# Patient Record
Sex: MALE | Race: WHITE | NOT HISPANIC OR LATINO | Employment: OTHER
[De-identification: names, ages, dates, MRNs, and addresses within clinical notes are randomized per-mention and may not be internally consistent; named-entity substitution may affect disease eponyms.]

---

## 2017-01-18 ENCOUNTER — OFFICE VISIT (OUTPATIENT)
Dept: DENTISTRY | Facility: CLINIC | Age: 47
End: 2017-01-18

## 2017-01-18 DIAGNOSIS — G47.33 OSA (OBSTRUCTIVE SLEEP APNEA): Primary | ICD-10-CM

## 2017-01-18 NOTE — Clinical Note
1/18/2017       RE: Vinicius Jerome  [address] [facility] [address]     Dear Colleague,    Thank you for referring your patient, Vinicius Jerome, to the [facility] at [facility]. Please see a copy of my visit note below.    S:   - pt in no acute distress  - no change in general health status  - pt and wife are satisfied with treatment effect   - no jaw pain, jaw discomfort, or jaw related headaches currently  - no functional problems (eating, chewing etc.)  - no bite problems  - no ear problems  - no acute dental issues  - stress is ok  - No family of arthritis or CA relevant for WILDER    O:  - Opening and protrusion: ok  - Teeth ok  - No M. Mass. + Temp palpation pain or discomfort  - No TMJ palpation pain  - No neck palpation pain  - No joint clicking  - V and VII are grossly intact  - lips ok, salivary glands ok, oral mucosa ok      A:  - Snoring (WILDER in supine position)  P:  - Pt knows how to adjust appliance but I instructed him not advance appliance without consultation  - follow-up appointment in 6 months scheduled for splint maintenance and oral health check  - pt should also inform regular dentist about the appliance  - splint care and morning exercises reinforced  if problems should occur pt should call     Again, thank you for allowing me to participate in the care of your patient.      Sincerely,    Ernesto Rodrigez DDS

## 2017-01-18 NOTE — Clinical Note
Date:January 20, 2017      Patient was self referred, no letter generated. Do not send.        [facility] Health Information

## 2017-01-19 NOTE — PROGRESS NOTES
S:   - pt in no acute distress  - no change in general health status  - pt and wife are satisfied with treatment effect   - no jaw pain, jaw discomfort, or jaw related headaches currently  - no functional problems (eating, chewing etc.)  - no bite problems  - no ear problems  - no acute dental issues  - stress is ok  - No family of arthritis or CA relevant for WILDER    O:  - Opening and protrusion: ok  - Teeth ok  - No M. Mass. + Temp palpation pain or discomfort  - No TMJ palpation pain  - No neck palpation pain  - No joint clicking  - V and VII are grossly intact  - lips ok, salivary glands ok, oral mucosa ok      A:  - Snoring (WILDER in supine position)  P:  - Pt knows how to adjust appliance but I instructed him not advance appliance without consultation  - follow-up appointment in 6 months scheduled for splint maintenance and oral health check  - pt should also inform regular dentist about the appliance  - splint care and morning exercises reinforced  - if problems should occur pt should call

## 2017-03-15 ENCOUNTER — TRANSFERRED RECORDS (OUTPATIENT)
Dept: HEALTH INFORMATION MANAGEMENT | Facility: CLINIC | Age: 47
End: 2017-03-15

## 2017-03-15 LAB
HBA1C MFR BLD: 7.8 % (ref 4–6)
TSH SERPL-ACNC: 4.11 MCU/ML

## 2017-04-26 ENCOUNTER — OFFICE VISIT (OUTPATIENT)
Dept: FAMILY MEDICINE | Facility: CLINIC | Age: 47
End: 2017-04-26
Payer: COMMERCIAL

## 2017-04-26 VITALS
HEART RATE: 65 BPM | SYSTOLIC BLOOD PRESSURE: 142 MMHG | TEMPERATURE: 97.5 F | WEIGHT: 202.2 LBS | OXYGEN SATURATION: 96 % | BODY MASS INDEX: 29.01 KG/M2 | DIASTOLIC BLOOD PRESSURE: 85 MMHG

## 2017-04-26 DIAGNOSIS — K64.4 EXTERNAL HEMORRHOIDS: Primary | ICD-10-CM

## 2017-04-26 PROCEDURE — 99213 OFFICE O/P EST LOW 20 MIN: CPT | Performed by: NURSE PRACTITIONER

## 2017-04-26 NOTE — PATIENT INSTRUCTIONS
"1.  Soak in hot water 4 times a day  2.  Apply the anusol twice a day  3.  Improve bowel transit - drink lots of water (aim for 90 oz per day) and consider a fiber supplement like psyllium husk (Metamucil) or Miralax    Right now, the hemorrhoid is not \"thrombosed,\" but looks like it may head that way.  Keep an eye on color, size and discomfort level.  If it turns deep purple, increases in pain or size, please schedule with colorectal surgery for hemrrhoid removal.      "

## 2017-04-26 NOTE — NURSING NOTE
"Chief Complaint   Patient presents with     Hemorrhoids       Initial /85 (BP Location: Left arm, Patient Position: Chair, Cuff Size: Adult Large)  Pulse 65  Temp 97.5  F (36.4  C) (Oral)  Wt 202 lb 3.2 oz (91.7 kg)  SpO2 96%  BMI 29.01 kg/m2 Estimated body mass index is 29.01 kg/(m^2) as calculated from the following:    Height as of 12/27/16: 5' 10\" (1.778 m).    Weight as of this encounter: 202 lb 3.2 oz (91.7 kg).  Medication Reconciliation: complete       Preet Genao MA      "

## 2017-04-26 NOTE — MR AVS SNAPSHOT
"              After Visit Summary   4/26/2017    Vinicius Jerome    MRN: [id number]           Patient Information     Date Of Birth          1970        Visit Information        Provider Department      4/26/2017 2:30 PM Teresa Edwards APRN [facility]        Today's Diagnoses     External hemorrhoids    -  1      Care Instructions    1.  Soak in hot water 4 times a day  2.  Apply the anusol twice a day  3.  Improve bowel transit - drink lots of water (aim for 90 oz per day) and consider a fiber supplement like psyllium husk (Metamucil) or Miralax    Right now, the hemorrhoid is not \"thrombosed,\" but looks like it may head that way.  Keep an eye on color, size and discomfort level.  If it turns deep purple, increases in pain or size, please schedule with colorectal surgery for hemrrhoid removal.            Follow-ups after your visit        Additional Services     COLORECTAL SURGERY REFERRAL       Your provider has referred you to: FMG: [facility] [place] [phone]   [email or web address]  FHN: [facility] [place] [phone]   [email or web address]  FHN: [facility] [place] [phone]   [email or web address]    Referral Reason(s): Hemorrhoids  Special Concerns: Diabetes - A1C 7.5-7.8  This referral is: Elective (week +)  It is OK to leave a message on patient's voicemail.    Please be aware that coverage of these services is subject to the terms and limitations of your health insurance plan.  Call member services at your health plan with any benefit or coverage questions.      Please bring the following with you to your appointment:    (1) Any X-Rays, CTs or MRIs which have been performed.  Contact the facility where they were done to arrange for  prior to your scheduled appointment.    (2) List of current medications  (3) This referral request " "  (4) Any documents/labs given to you for this referral                  Who to contact     If you have questions or need follow up information about today's clinic visit or your schedule please contact [facility] directly at [phone].  Normal or non-critical lab and imaging results will be communicated to you by MyChart, letter or phone within 4 business days after the clinic has received the results. If you do not hear from us within 7 days, please contact the clinic through [employer] or phone. If you have a critical or abnormal lab result, we will notify you by phone as soon as possible.  Submit refill requests through [employer] or call your pharmacy and they will forward the refill request to us. Please allow 3 business days for your refill to be completed.          Additional Information About Your Visit        Mavrx[employer]Visier Information     [employer]ue lets you send messages to your doctor, view your test results, renew your prescriptions, schedule appointments and more. To sign up, go to [email or web address] . Click on \"Log in\" on the left side of the screen, which will take you to the Welcome page. Then click on \"Sign up Now\" on the right side of the page.     You will be asked to enter the access code listed below, as well as some personal information. Please follow the directions to create your username and password.     Your access code is: [email or web address]  Expires: 2017  3:00 PM     Your access code will  in 90 days. If you need help or a new code, please call your [facility] or [phone].        Care EveryWhere ID     This is your Care EveryWhere ID. This could be used by other organizations to access your [facility] medical records  [id number]        Your Vitals Were     Pulse Temperature Pulse Oximetry BMI (Body Mass Index)          65 97.5  F (36.4  C) (Oral) 96% 29.01 kg/m2         Blood Pressure from Last 3 Encounters:   17 142/85   16 124/86   16 133/72    " Weight from Last 3 Encounters:   04/26/17 202 lb 3.2 oz (91.7 kg)   12/27/16 197 lb 9.6 oz (89.6 kg)   12/17/16 197 lb (89.4 kg)              We Performed the Following     COLORECTAL SURGERY REFERRAL          Today's Medication Changes          These changes are accurate as of: 4/26/17  3:04 PM.  If you have any questions, ask your nurse or doctor.               Start taking these medicines.        Dose/Directions    hydrocortisone 2.5 % cream   Commonly known as:  ANUSOL-HC   Used for:  External hemorrhoids   Started by:  Teresa Edwards APRN CNP        Place rectally 2 times daily   Quantity:  30 g   Refills:  1            Where to get your medicines      These medications were sent to [facility] [place], [place] - [address]  [address] [place] [address], [facility] [address]     Phone:  [phone]     hydrocortisone 2.5 % cream                Primary Care Provider    None Specified       No primary provider on file.        Thank you!     Thank you for choosing [facility]  for your care. Our goal is always to provide you with excellent care. Hearing back from our patients is one way we can continue to improve our services. Please take a few minutes to complete the written survey that you may receive in the mail after your visit with us. Thank you!             Your Updated Medication List - Protect others around you: Learn how to safely use, store and throw away your medicines at www.disposemymeds.org.          This list is accurate as of: 4/26/17  3:04 PM.  Always use your most recent med list.                   Brand Name Dispense Instructions for use    ACE NOT PRESCRIBED (INTENTIONAL)     0 each    ACE Inhibitor not prescribed due to       amoxicillin-clavulanate 875-125 MG per tablet    AUGMENTIN    20 tablet    Take 1 tablet by mouth 2 times daily       aspirin 81 MG tablet          1 TABLET DAILY       fluticasone 50 MCG/ACT spray    FLONASE    16 g    Spray 1-2 sprays  into both nostrils daily       hydrocortisone 2.5 % cream    ANUSOL-HC    30 g    Place rectally 2 times daily       insulin infusion pump Alexus      Use as directed       LIPID LOWERING THERAPY NOT PRESCRIBED (INTENTIONAL)     0 each    Lipid lowering therapy not prescribed intentionally due to Other pt is following up with endocrinology (This option does not exclude patient from measure)       MULTI vitamin  MENS Tabs          NovoLOG VIAL 100 UNITS/ML injection   Generic drug:  insulin aspart          ONE TOUCH ULTRA test strip   Generic drug:  blood glucose monitoring          ZYRTEC PO

## 2017-04-26 NOTE — PROGRESS NOTES
SUBJECTIVE:                                                    Vinicius Jerome is a 47 year old male who presents to clinic today for the following health issues:    Hemorrhoids     Onset: Monday evening to early Tuesday    Description:   Pain: YES- when sititng  Itching: no     Accompanying Signs & Symptoms:  Blood streaked toilet paper: no   Blood in stool: no   Changes in stool pattern: no    History:   Any previous GI studies done: Colonoscopy 9 years ago   Family History of colon cancer: YES- aunt (dad sister)     Precipitating factors:   None    Alleviating factors:  None     Therapies Tried and outcome: none      Questions/Concerns: None      Problem list and histories reviewed & adjusted, as indicated.  Additional history: as documented      Reviewed and updated as needed this visit by clinical staff       Reviewed and updated as needed this visit by Provider         ROS:    ROS:5 point ROS including CONST, HEENT, Respiratory, CV, and GI other than that noted in the HPI,  is negative       OBJECTIVE:                                                    /85 (BP Location: Left arm, Patient Position: Chair, Cuff Size: Adult Large)  Pulse 65  Temp 97.5  F (36.4  C) (Oral)  Wt 202 lb 3.2 oz (91.7 kg)  SpO2 96%  BMI 29.01 kg/m2  Body mass index is 29.01 kg/(m^2).  GENERAL: healthy, alert and no distress  RESP: lungs clear to auscultation - no rales, rhonchi or wheezes  CV: regular rate and rhythm, normal S1 S2, no S3 or S4, no murmur, click or rub, no peripheral edema and peripheral pulses strong  ABDOMEN: soft, nontender, no hepatosplenomegaly, no masses and bowel sounds normal  RECTAL (male): sphincter tone normal, no fissures and right sided external, soft, slightly purplish hemorrhoid    Diagnostic Test Results:  none      ASSESSMENT/PLAN:                                                      (K64.4) External hemorrhoids  (primary encounter diagnosis)  Comment:   Plan: COLORECTAL SURGERY REFERRAL,  "hydrocortisone         (ANUSOL-HC) 2.5 % cream    Patient Instructions   1.  Soak in hot water 4 times a day  2.  Apply the anusol twice a day  3.  Improve bowel transit - drink lots of water (aim for 90 oz per day) and consider a fiber supplement like psyllium husk (Metamucil) or Miralax    Right now, the hemorrhoid is not \"thrombosed,\" but looks like it may head that way.  Keep an eye on color, size and discomfort level.  If it turns deep purple, increases in pain or size, please schedule with colorectal surgery for hemrrhoid removal.          LINDA Tan [facility]3]    "

## 2017-06-27 ENCOUNTER — TRANSFERRED RECORDS (OUTPATIENT)
Dept: HEALTH INFORMATION MANAGEMENT | Facility: CLINIC | Age: 47
End: 2017-06-27

## 2017-06-27 LAB
ALT SERPL-CCNC: 16 U/L (ref 9–46)
CREAT SERPL-MCNC: 1.24 MG/DL (ref 0.6–1.35)
GFR SERPL CREATININE-BSD FRML MDRD: 68 ML/MIN/1.73M2
GLUCOSE SERPL-MCNC: 212 MG/DL (ref 65–99)
HBA1C MFR BLD: 7.9 % (ref 4–6)
POTASSIUM SERPL-SCNC: 4.7 MMOL/L (ref 3.5–5.3)
TSH SERPL-ACNC: 3.62 UIU/ML (ref 0.3–5)

## 2017-07-09 ENCOUNTER — TRANSFERRED RECORDS (OUTPATIENT)
Dept: HEALTH INFORMATION MANAGEMENT | Facility: CLINIC | Age: 47
End: 2017-07-09

## 2017-11-13 ENCOUNTER — TELEPHONE (OUTPATIENT)
Dept: FAMILY MEDICINE | Facility: CLINIC | Age: 47
End: 2017-11-13

## 2017-11-13 NOTE — TELEPHONE ENCOUNTER
Panel Management Review      Patient has the following on his problem list:     Diabetes    ASA: Failed    Last A1C  Lab Results   Component Value Date    A1C 7.9 06/27/2017    A1C 7.8 03/15/2017    A1C 7.6 02/10/2014    A1C 7.5 10/10/2013    A1C 7.5 06/25/2013     A1C tested: FAILED    Last LDL:    Lab Results   Component Value Date    CHOL 126 03/18/2013     Lab Results   Component Value Date    HDL 45 03/18/2013     Lab Results   Component Value Date    LDL 73 03/18/2013     Lab Results   Component Value Date    TRIG 39 03/18/2013     Lab Results   Component Value Date    CHOLHDLRATIO 2.8 03/18/2013     No results found for: NHDL    Is the patient on a Statin? NO             Is the patient on Aspirin? NO    Medications     Salicylates    ASPIRIN 81 MG OR TABS          Last three blood pressure readings:  BP Readings from Last 3 Encounters:   04/26/17 142/85   12/27/16 124/86   12/17/16 133/72       Date of last diabetes office visit: 06/27/2017     Tobacco History:     History   Smoking Status     Never Smoker   Smokeless Tobacco     Never Used             Composite cancer screening  Chart review shows that this patient is due/due soon for the following None  Summary:    Patient is due/failing the following:   A1C    Action needed:   Patient needs office visit for diabetes follow up.    Type of outreach:    Sent letter.    Questions for provider review:    None                                                                                                                                    Preet Genao MA     Chart routed to none.

## 2017-11-13 NOTE — LETTER
November 13, 2017      Vinicius RICKEY Jerome  [address] [facility] [address]        Dear Vinicius,    In order to ensure we are providing the best quality care, we have reviewed your chart and see that you are due for:    1. Diabetes follow up    Please call the clinic at your earliest convenience to schedule an appointment.  If you have completed these please contact our office via phone or [employer] to update our records.  We would like to know the date (approximately month and year), the result, and ideally where the procedure was performed.    Thank you for trusting us with your health care.      Sincerely,    Care Team for SIMA Marlow

## 2018-01-30 ENCOUNTER — TELEPHONE (OUTPATIENT)
Dept: FAMILY MEDICINE | Facility: CLINIC | Age: 48
End: 2018-01-30

## 2018-01-30 NOTE — TELEPHONE ENCOUNTER
Panel Management Review      Patient has the following on his problem list:     Diabetes    ASA: Not Required     Last A1C  Lab Results   Component Value Date    A1C 7.9 06/27/2017    A1C 7.8 03/15/2017    A1C 7.6 02/10/2014    A1C 7.5 10/10/2013    A1C 7.5 06/25/2013     A1C tested: FAILED    Last LDL:    Lab Results   Component Value Date    CHOL 126 03/18/2013     Lab Results   Component Value Date    HDL 45 03/18/2013     Lab Results   Component Value Date    LDL 73 03/18/2013     Lab Results   Component Value Date    TRIG 39 03/18/2013     Lab Results   Component Value Date    CHOLHDLRATIO 2.8 03/18/2013     No results found for: NHDL    Is the patient on a Statin? NO             Is the patient on Aspirin? NO    Medications     Salicylates    ASPIRIN 81 MG OR TABS          Last three blood pressure readings:  BP Readings from Last 3 Encounters:   04/26/17 142/85   12/27/16 124/86   12/17/16 133/72       Date of last diabetes office visit: 12/17/2016     Tobacco History:     History   Smoking Status     Never Smoker   Smokeless Tobacco     Never Used           Composite cancer screening  Chart review shows that this patient is due/due soon for the following None  Summary:    Patient is due/failing the following:   A1C    Action needed:   Patient needs office visit for diabetes follow up.    Type of outreach:    Phone, left message for patient to call back.     Questions for provider review:    None                                                                                                                                    Preet Genao MA       Chart routed to none.

## 2018-04-17 ENCOUNTER — TELEPHONE (OUTPATIENT)
Dept: ENDOCRINOLOGY | Facility: CLINIC | Age: 48
End: 2018-04-17

## 2018-04-17 ENCOUNTER — OFFICE VISIT (OUTPATIENT)
Dept: ENDOCRINOLOGY | Facility: CLINIC | Age: 48
End: 2018-04-17
Payer: COMMERCIAL

## 2018-04-17 VITALS
HEIGHT: 70 IN | HEART RATE: 64 BPM | SYSTOLIC BLOOD PRESSURE: 128 MMHG | WEIGHT: 203 LBS | DIASTOLIC BLOOD PRESSURE: 81 MMHG | BODY MASS INDEX: 29.06 KG/M2

## 2018-04-17 DIAGNOSIS — Z96.41 INSULIN PUMP STATUS: ICD-10-CM

## 2018-04-17 DIAGNOSIS — E10.9 TYPE 1 DIABETES MELLITUS WITHOUT COMPLICATION (H): ICD-10-CM

## 2018-04-17 DIAGNOSIS — E10.9 TYPE 1 DIABETES MELLITUS WITHOUT COMPLICATION (H): Primary | ICD-10-CM

## 2018-04-17 LAB — HBA1C MFR BLD: 8.1 % (ref 0–5.6)

## 2018-04-17 PROCEDURE — 99214 OFFICE O/P EST MOD 30 MIN: CPT | Performed by: INTERNAL MEDICINE

## 2018-04-17 PROCEDURE — 36415 COLL VENOUS BLD VENIPUNCTURE: CPT | Performed by: INTERNAL MEDICINE

## 2018-04-17 PROCEDURE — 80048 BASIC METABOLIC PNL TOTAL CA: CPT | Performed by: INTERNAL MEDICINE

## 2018-04-17 PROCEDURE — 82043 UR ALBUMIN QUANTITATIVE: CPT | Performed by: INTERNAL MEDICINE

## 2018-04-17 PROCEDURE — 84460 ALANINE AMINO (ALT) (SGPT): CPT | Performed by: INTERNAL MEDICINE

## 2018-04-17 PROCEDURE — 83036 HEMOGLOBIN GLYCOSYLATED A1C: CPT | Performed by: INTERNAL MEDICINE

## 2018-04-17 PROCEDURE — 84443 ASSAY THYROID STIM HORMONE: CPT | Performed by: INTERNAL MEDICINE

## 2018-04-17 NOTE — PATIENT INSTRUCTIONS
Continue current insulin pump and diabetes management plan  Plan to see Diabetes Educator at the [facility] to review insulin pump upgrade options   Including the Medtronic 630G vs 670G + Guardian3 sensor options  Consider signing up on the [employer] Books system for easier messaging  Call/message if questions.

## 2018-04-17 NOTE — TELEPHONE ENCOUNTER
Reason for Call:  Medication or medication refill:    Do you use a [facility]?  Name of the pharmacy and phone number for the current request:         [facility] - [place], [place] - [address]    Name of the medication requested: NOVOLOG VIAL 100 UNIT/ML soln    Other request: pharmacy wants to make sure you want a daily dose of 45 units?  They previously had 60 units daily on file, and just want to make sure the 45 units is correct.    Please call to advise    Can we leave a detailed message on this number? YES    Phone number pharmacy can be reached at: [phone]    Best Time: anytime    Call taken on 4/17/2018 at 12:11 PM by Luna Glover  .

## 2018-04-17 NOTE — LETTER
4/17/2018         RE: Vinicius Jerome  [address] [facility] [id number]        Dear Colleague,    Thank you for referring your patient, Vinicius Jerome, to the [facility]. Please see a copy of my visit note below.    Name: Vinicius Jerome is a 48 year old man, self-referred for evaluation of diabetes mellitus.  Previously seen by me at my former clinic ([facility]), here to establish care with [facility].    HPI:  Recent issues:  Here for f/u DM evaluation.  Feeling well overall, but family stressors with son had spinal fusion for treatment of scoliosis problem        Diagnosis of diabetes mellitus age 14, living in [facility]  ... Initial treatment with insulin injections, using Humalog, NPH, and possibly Lantus insulins  2001. Switched to insulin pump management, Medtronic pump  Current pump Medtronic 523 pump   Novolog in pump  Recalls pump OOW now  Using OneTouch Ultra BG meter, tests 4x/day  Has used Medtronic CGMS years ago  Previous [facility] labs include:  Lab Results   Component Value Date    A1C 7.9 (H) 06/27/2017     04/08/2008    POTASSIUM 4.7 06/27/2017    CHLORIDE 102 03/05/2008    CO2 28 03/05/2008    ANIONGAP 8 03/05/2008     (H) 06/27/2017    BUN 17 03/05/2008    CR 1.24 06/27/2017    GFRESTIMATED 68 06/27/2017    GFRESTBLACK 79 06/27/2017    JOCE 9.3 03/05/2008    CHOL 126 03/18/2013    TRIG 39 03/18/2013    HDL 45 03/18/2013    LDL 73 03/18/2013    MICROL 3mcg 11/26/2012     DM Complications:  none  Last eye exam 9/2018 at [facility]    , 2-children.  Works for [employer] in [employer] Dept, [place]  Sees for general medicine evaluations.    PMH/PSH:  Past Medical History:   Diagnosis Date     Congenital hypertrophic pyloric stenosis     surgically corrected as a child     Type 1 diabetes mellitus with moderate nonproliferative diabetic retinopathy and without macular edema (H)  12/17/2016     Type 1 diabetes mellitus with moderate nonproliferative retinopathy of both eyes without macular edema (H) 12/17/2016     Type I (juvenile type) diabetes mellitus without mention of complication, not stated as uncontrolled      Past Surgical History:   Procedure Laterality Date     C INCISION OF PYLORIC MUSCLE       C INSERT SUBCUT RESERV/PUMP/DEV       FINGER SURGERY         Family Hx:  Family History   Problem Relation Age of Onset     Cardiovascular Father      brain aneurysm- fully recovered     CANCER Mother      brain tumor - left her paralized on 1 side     CEREBROVASCULAR DISEASE Maternal Grandmother      HEART DISEASE Paternal Grandfather      HEART DISEASE Paternal Grandmother          Social Hx:  Social History     Social History     Marital status:      Spouse name: N/A     Number of children: 2     Years of education: N/A     Occupational History     Not on file.     Social History Main Topics     Smoking status: Never Smoker     Smokeless tobacco: Never Used     Alcohol use No     Drug use: No     Sexual activity: Yes     Partners: Female     Other Topics Concern     Parent/Sibling W/ Cabg, Mi Or Angioplasty Before 65f 55m? No     Social History Narrative          MEDICATIONS:  has a current medication list which includes the following prescription(s): cetirizine hcl, novolog vial, onetouch ultra, LIPID LOWERING THERAPY NOT PRESCRIBED, INTENTIONAL,, insulin infusion pump, multi vitamin  mens, and aspirin.    ROS:     ROS: 10 point ROS neg other than the symptoms noted above in the HPI.    GENERAL: no weight changes, fatigue, fevers, chills, night sweats.   HEENT: no dysphagia, odonophagia, diplopia, neck pain  THYROID:  no apparent hyper or hypothyroid symptoms  CV: no chest pain, pressure, palpitations  LUNGS: no SOB, VARGAS, cough, wheezing   ABDOMEN: no diarrhea, constipation, abdominal pain  EXTREMITIES: no rashes, ulcers, edema  NEUROLOGY: no headaches, denies changes in vision,  "tingling, extremitiy numbness   MSK: no muscle aches or pains, weakness  SKIN: no rashes or lesions  : good erection function, denies nocturia  PSYCH:  stable mood, no significant anxiety or depression  ENDOCRINE: no heat or cold intolerance    Physical Exam   VS: /81 (BP Location: Right arm, Cuff Size: Adult Regular)  Pulse 64  Ht 5' 10\" (1.778 m)  Wt 203 lb (92.1 kg)  BMI 29.13 kg/m2  GENERAL: AXOX3, NAD, well dressed, answering questions appropriately, appears stated age.  THYROID:  normal gland, no apparent nodules or goiter  HEENT: neck non-tender, no exopthalmous, EOMI  CV: RRR, no rubs, gallops, no murmurs  LUNGS: CTAB, no wheezes, rales, or ronchi  ABDOMEN: soft, nontender, nondistended  EXTREMITIES: no edema, +pedal pulses  NEUROLOGY: CN grossly intact, no tremors  MSK: grossly intact  SKIN: no rashes, no lesions    LABS:    All pertinent notes, labs, and images personally reviewed by me.     A/P:  Encounter Diagnoses   Name Primary?     Type 1 diabetes mellitus without complication (H) Yes     Insulin pump status      Comments:  Reviewed health history and diabetes issues.  Recent avg  mg/dl, often higher in evenings.    Plan:  Reviewed the overall T1DM management and insulin pump use.  Discussed optimal BG testing to assess glucose trends.  We reviewed insulin pump settings, basal rate and bolus dosing  Use of automated pump bolus dosing for meal/snack carb & correction dosing  Reviewed the Medtronic Carelink report data today    Recommend:  Continue insulin pump management plan.  Pump setting changes:   Change basal rates as 6p 0.65 to 0.675, 11p 0.65 to 0.675 u/hr   Continue current I:C ratios B17, L17, S15 and ISF daytime 55  Check nonfasting labs today  Plan to see DM Educator to review insulin pump upgrade options   Will arrange at the nearby  AV clinic  Consider future use of Fiasp insulin  Needs updated Rx's:    Novolog (5-vial) Rx, OneTouch Ultra TS's, uses  Mail Order " pharmacy (3-mo amt)  [facility]  Arrange annual dilated eye exam, fasting lipid panel testing.    Addressed patient's questions today.    Labs ordered today:   Orders Placed This Encounter   Procedures     Basic metabolic panel     ALT     Albumin Random Urine Quantitative with Creat Ratio     TSH     Hemoglobin A1c     DIABETES EDUCATOR REFERRAL     Radiology/Consults ordered today: DIABETES EDUCATOR REFERRAL    More than 50% of the time spent with Mr. Jerome on counseling / coordinating his care.  Total appointment time was 30 minutes.    Follow-up:  3 mo    Julien Escalona MD  Endocrinology  [facility]/[place]        Again, thank you for allowing me to participate in the care of your patient.        Sincerely,        Julien Escalona MD

## 2018-04-17 NOTE — NURSING NOTE
"Chief Complaint   Patient presents with     Diabetes       Initial /81 (BP Location: Right arm, Cuff Size: Adult Regular)  Pulse 64  Ht 5' 10\" (1.778 m)  Wt 203 lb (92.1 kg)  BMI 29.13 kg/m2 Estimated body mass index is 29.13 kg/(m^2) as calculated from the following:    Height as of this encounter: 5' 10\" (1.778 m).    Weight as of this encounter: 203 lb (92.1 kg).  Medication Reconciliation: complete       Carmine Escalera      "

## 2018-04-17 NOTE — TELEPHONE ENCOUNTER
Message from pharmacy noted.  I corrected the TDD as 60U per day and then resent the Novolog vial Rx today.    CACHORRO Escalona MD  [facility]/[place]

## 2018-04-17 NOTE — MR AVS SNAPSHOT
"              After Visit Summary   4/17/2018    Vinicius Jerome    MRN: [id number]           Patient Information     Date Of Birth          1970        Visit Information        Provider Department      4/17/2018 8:00 AM Julien Escalona MD [facility]        Today's Diagnoses     Type 1 diabetes mellitus without complication (H)    -  1    Insulin pump status          Care Instructions    Continue current insulin pump and diabetes management plan  Plan to see Diabetes Educator at the [facility] to review insulin pump upgrade options   Including the Medtronic 630G vs 670G + Guardian3 sensor options  Consider signing up on the [employer] system for easier messaging  Call/message if questions.          Follow-ups after your visit        Follow-up notes from your care team     Return in about 3 months (around 7/17/2018).      Who to contact     If you have questions or need follow up information about today's clinic visit or your schedule please contact [facility] directly at [phone].  Normal or non-critical lab and imaging results will be communicated to you by [employer], letter or phone within 4 business days after the clinic has received the results. If you do not hear from us within 7 days, please contact the clinic through [employer] or phone. If you have a critical or abnormal lab result, we will notify you by phone as soon as possible.  Submit refill requests through [employer] or call your pharmacy and they will forward the refill request to us. Please allow 3 business days for your refill to be completed.          Additional Information About Your Visit        [employer] Information     [employer] lets you send messages to your doctor, view your test results, renew your prescriptions, schedule appointments and more. To sign up, go to [email or web address] . Click on \"Log in\" on the left side of the screen, which will take you to the Welcome page. Then click on \"Sign up " "Now\" on the right side of the page.     You will be asked to enter the access code listed below, as well as some personal information. Please follow the directions to create your username and password.     Your access code is: [email or web address]  Expires: 2018  8:32 AM     Your access code will  in 90 days. If you need help or a new code, please call your [facility] or [phone].        Care EveryWhere ID     This is your Care EveryWhere ID. This could be used by other organizations to access your [place] medical records  [id number]        Your Vitals Were     Pulse Height BMI (Body Mass Index)             64 5' 10\" (1.778 m) 29.13 kg/m2          Blood Pressure from Last 3 Encounters:   18 128/81   17 142/85   16 124/86    Weight from Last 3 Encounters:   18 203 lb (92.1 kg)   17 202 lb 3.2 oz (91.7 kg)   16 197 lb 9.6 oz (89.6 kg)              We Performed the Following     Albumin Random Urine Quantitative with Creat Ratio     ALT     Basic metabolic panel     Hemoglobin A1c     TSH        Primary Care Provider Office Phone # Fax #    [facility] [phone] [address] [address] [address]        Equal Access to Services     KEVON VELA : Hadii aad ku hadasho Soomaali, waaxda luqadaha, qaybta kaalmada adeegyada, luciano prater. So [facility] [phone].    ATENCIÓN: Si habla español, tiene a christina disposición servicios gratuitos de asistencia lingüística. Llame al [phone].    We comply with applicable federal civil rights laws and [place] laws. We do not discriminate on the basis of race, color, national origin, age, disability, sex, sexual orientation, or gender identity.            Thank you!     Thank you for choosing [facility]  for your care. Our goal is always to provide you with excellent care. Hearing back from our patients is one way we can continue to improve our services. " Please take a few minutes to complete the written survey that you may receive in the mail after your visit with us. Thank you!             Your Updated Medication List - Protect others around you: Learn how to safely use, store and throw away your medicines at www.disposemymeds.org.          This list is accurate as of 4/17/18  8:32 AM.  Always use your most recent med list.                   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

## 2018-04-17 NOTE — PROGRESS NOTES
Name: Vinicius Jerome is a 48 year old man, self-referred for evaluation of diabetes mellitus.  Previously seen by me at my former clinic ([facility]), here to establish care with [facility].    HPI:  Recent issues:  Here for f/u DM evaluation.  Feeling well overall, but family stressors with son had spinal fusion for treatment of scoliosis problem        Diagnosis of diabetes mellitus age 14, living in [facility]  ... Initial treatment with insulin injections, using Humalog, NPH, and possibly Lantus insulins  2001. Switched to insulin pump management, Medtronic pump  Current pump Medtronic 523 pump   Novolog in pump  Recalls pump OOW now  Using OneTouch Ultra BG meter, tests 4x/day  Has used Medtronic CGMS years ago  Previous [facility] labs include:  Lab Results   Component Value Date    A1C 7.9 (H) 06/27/2017     04/08/2008    POTASSIUM 4.7 06/27/2017    CHLORIDE 102 03/05/2008    CO2 28 03/05/2008    ANIONGAP 8 03/05/2008     (H) 06/27/2017    BUN 17 03/05/2008    CR 1.24 06/27/2017    GFRESTIMATED 68 06/27/2017    GFRESTBLACK 79 06/27/2017    JOCE 9.3 03/05/2008    CHOL 126 03/18/2013    TRIG 39 03/18/2013    HDL 45 03/18/2013    LDL 73 03/18/2013    MICROL 3mcg 11/26/2012     DM Complications:  none  Last eye exam 9/2018 at [facility]    , 2-children.  Works for [employer] in [employer] Dept, [place] office  Sees for general medicine evaluations.    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) 12/17/2016     Type I (juvenile type) diabetes mellitus without mention of complication, not stated as uncontrolled       Past Surgical History:   Procedure Laterality Date     C INCISION OF PYLORIC MUSCLE       C INSERT SUBCUT RESERV/PUMP/DEV       FINGER SURGERY         Family Hx:  Family History   Problem Relation Age of Onset     Cardiovascular Father      brain aneurysm- fully recovered     CANCER Mother      brain tumor - left her paralized on 1 side     CEREBROVASCULAR DISEASE Maternal Grandmother      HEART DISEASE Paternal Grandfather      HEART DISEASE Paternal Grandmother          Social Hx:  Social History     Social History     Marital status:      Spouse name: N/A     Number of children: 2     Years of education: N/A     Occupational History     Not on file.     Social History Main Topics     Smoking status: Never Smoker     Smokeless tobacco: Never Used     Alcohol use No     Drug use: No     Sexual activity: Yes     Partners: Female     Other Topics Concern     Parent/Sibling W/ Cabg, Mi Or Angioplasty Before 65f 55m? No     Social History Narrative          MEDICATIONS:  has a current medication list which includes the following prescription(s): cetirizine hcl, novolog vial, onetouch ultra, LIPID LOWERING THERAPY NOT PRESCRIBED, INTENTIONAL,, insulin infusion pump, multi vitamin  mens, and aspirin.    ROS:     ROS: 10 point ROS neg other than the symptoms noted above in the HPI.    GENERAL: no weight changes, fatigue, fevers, chills, night sweats.   HEENT: no dysphagia, odonophagia, diplopia, neck pain  THYROID:  no apparent hyper or hypothyroid symptoms  CV: no chest pain, pressure, palpitations  LUNGS: no SOB, VARGAS, cough, wheezing   ABDOMEN: no diarrhea, constipation, abdominal pain  EXTREMITIES: no rashes, ulcers, edema  NEUROLOGY: no headaches, denies changes in vision, tingling, extremitiy numbness   MSK: no muscle aches or pains, weakness  SKIN: no rashes or lesions  : good erection function, denies nocturia  PSYCH:  stable mood, no significant anxiety or depression  ENDOCRINE: no heat or cold  "intolerance    Physical Exam   VS: /81 (BP Location: Right arm, Cuff Size: Adult Regular)  Pulse 64  Ht 5' 10\" (1.778 m)  Wt 203 lb (92.1 kg)  BMI 29.13 kg/m2  GENERAL: AXOX3, NAD, well dressed, answering questions appropriately, appears stated age.  THYROID:  normal gland, no apparent nodules or goiter  HEENT: neck non-tender, no exopthalmous, EOMI  CV: RRR, no rubs, gallops, no murmurs  LUNGS: CTAB, no wheezes, rales, or ronchi  ABDOMEN: soft, nontender, nondistended  EXTREMITIES: no edema, +pedal pulses  NEUROLOGY: CN grossly intact, no tremors  MSK: grossly intact  SKIN: no rashes, no lesions    LABS:    All pertinent notes, labs, and images personally reviewed by me.     A/P:  Encounter Diagnoses   Name Primary?     Type 1 diabetes mellitus without complication (H) Yes     Insulin pump status      Comments:  Reviewed health history and diabetes issues.  Recent avg  mg/dl, often higher in evenings.    Plan:  Reviewed the overall T1DM management and insulin pump use.  Discussed optimal BG testing to assess glucose trends.  We reviewed insulin pump settings, basal rate and bolus dosing  Use of automated pump bolus dosing for meal/snack carb & correction dosing  Reviewed the [employer]onic Carelink report data today    Recommend:  Continue insulin pump management plan.  Pump setting changes:   Change basal rates as 6p 0.65 to 0.675, 11p 0.65 to 0.675 u/hr   Continue current I:C ratios B17, L17, S15 and ISF daytime 55  Check nonfasting labs today  Plan to see DM Educator to review insulin pump upgrade options   Will arrange at the nearby  AV clinic  Consider future use of Fiasp insulin  Needs updated Rx's:    Novolog (5-vial) Rx, OneTouch Ultra TS's, uses  Mail Order pharmacy (3-mo amt)  [facility]  Arrange annual dilated eye exam, fasting lipid panel testing.    Addressed patient's questions today.    Labs ordered today:   Orders Placed This Encounter   Procedures     " Basic metabolic panel     ALT     Albumin Random Urine Quantitative with Creat Ratio     TSH     Hemoglobin A1c     DIABETES EDUCATOR REFERRAL     Radiology/Consults ordered today: DIABETES EDUCATOR REFERRAL    More than 50% of the time spent with Mr. Jerome on counseling / coordinating his care.  Total appointment time was 30 minutes.    Follow-up:  3 mo    Julien Escalona MD  Endocrinology  [facility]/[place]

## 2018-04-18 LAB
ALT SERPL W P-5'-P-CCNC: 24 U/L (ref 0–70)
ANION GAP SERPL CALCULATED.3IONS-SCNC: 2 MMOL/L (ref 3–14)
BUN SERPL-MCNC: 20 MG/DL (ref 7–30)
CALCIUM SERPL-MCNC: 8.7 MG/DL (ref 8.5–10.1)
CHLORIDE SERPL-SCNC: 107 MMOL/L (ref 94–109)
CO2 SERPL-SCNC: 30 MMOL/L (ref 20–32)
CREAT SERPL-MCNC: 1.08 MG/DL (ref 0.66–1.25)
GFR SERPL CREATININE-BSD FRML MDRD: 72 ML/MIN/1.7M2
GLUCOSE SERPL-MCNC: 237 MG/DL (ref 70–99)
POTASSIUM SERPL-SCNC: 4.7 MMOL/L (ref 3.4–5.3)
SODIUM SERPL-SCNC: 139 MMOL/L (ref 133–144)
TSH SERPL DL<=0.005 MIU/L-ACNC: 3.71 MU/L (ref 0.4–4)

## 2018-04-20 LAB
CREAT UR-MCNC: 136 MG/DL
MICROALBUMIN UR-MCNC: 5 MG/L
MICROALBUMIN/CREAT UR: 4.01 MG/G CR (ref 0–17)

## 2018-05-07 ENCOUNTER — ALLIED HEALTH/NURSE VISIT (OUTPATIENT)
Dept: EDUCATION SERVICES | Facility: CLINIC | Age: 48
End: 2018-05-07
Payer: COMMERCIAL

## 2018-05-07 DIAGNOSIS — E10.3393 TYPE 1 DIABETES MELLITUS WITH MODERATE NONPROLIFERATIVE RETINOPATHY OF BOTH EYES WITHOUT MACULAR EDEMA (H): Primary | ICD-10-CM

## 2018-05-07 PROCEDURE — G0108 DIAB MANAGE TRN  PER INDIV: HCPCS

## 2018-05-07 NOTE — PROGRESS NOTES
Diabetes Self Management Training: Insulin Pump Review Visit    Vinicius Jerome presents today for education related to Type 1 diabetes.    He is accompanied by self    Patient's diabetes management related comments/concerns: Medtronic pump is out of warranty and would like to know what options are available. Not sure interested in a sensor but would like to learn what is available.     ASSESSMENT:  Patient Problem List and Family Medical History reviewed for relevant medical history, current medical status, and diabetes risk factors.    Current Diabetes Management per Patient:  Insulin Pump Type: Medtronic 523/723    BG meter: One Touch meter    Taking other diabetes medications? no      Patient's most recent   Lab Results   Component Value Date    A1C 8.1 04/17/2018    is not meeting goal of <7.0      INTERVENTION:    Education provided today on:  Information provided on current  available insulin pump therapies, including Medtronic 670G closed loop system, Omnipod pump and Tandem insulin pump with Dexcom, advantages and disadvantages, and requirements for successful insulin pump therapy with each pump system were discussed. Hands on practice with pump button use each pump.     Opportunities for ongoing education and support in diabetes-self management were discussed.     Patientt verbalized understanding of concepts discussed and recommendations provided today.         Education Materials Provided:  Informational packets for Medtronic, Omnipod and Tandem insulin pumps, Dexcom and [employer].      PLAN:  Review packets and information provided at appointment today.    Call diabetes education when he decides to move forward with new or upgrading pump, will start the paperwork process and set up appointment for pump start education and training.       Eli Chawla RN,CDE   Time Spent: 60 minutes  Encounter Type: Individual      Any diabetes medication dose changes were made via the CDE Protocol and Collaborative  Practice Agreement with the patient's referring provider. A copy of this encounter was shared with the provider.

## 2018-05-07 NOTE — MR AVS SNAPSHOT
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  Normal or non-critical lab and imaging results will be communicated to you by MyChart, letter or phone within 4 business days after the clinic has received the results. If you do not hear from us within 7 days, please contact the clinic through [facility] or phone. If you have a critical or abnormal lab result, we will notify you by phone as soon as possible.  Submit refill requests through [facility] or call your pharmacy and they will forward the refill request to us. Please allow 3 business days for your refill to be completed.          Additional Information About Your Visit        MyChart Information     [facility] gives you secure access to your electronic health record. If you see a primary care provider, you can also send messages to your care team and make appointments. If you have questions, please call your primary care clinic.  If you do not have a primary care provider, please call [phone] and they will assist you.        Care EveryWhere ID     This is your Care EveryWhere ID. This could be used by other  organizations to access your [place] medical records  [id number]         Blood Pressure from Last 3 Encounters:   04/17/18 128/81   04/26/17 142/85   12/27/16 124/86    Weight from Last 3 Encounters:   04/17/18 92.1 kg (203 lb)   04/26/17 91.7 kg (202 lb 3.2 oz)   12/27/16 89.6 kg (197 lb 9.6 oz)              We Performed the Following     DIABETES EDUCATION - Individual  []        Primary Care Provider Fax #    Physician No Ref-Primary [phone]       No address on file        Equal Access to Services     [facility] : Hadii jose Lang, waaxda cinthyaqadaha, caraybalina kaalmahi jacobson, luciano machado . So [facility] [phone].    ATENCIÓN: Si habla español, tiene a christina disposición servicios gratuitos de asistencia lingüística. Llame al [phone].    We comply with applicable federal civil rights laws and [place] laws. We do not discriminate on the basis of race, color, national origin, age, disability, sex, sexual orientation, or gender identity.            Thank you!     Thank you for choosing [facility]  for your care. Our goal is always to provide you with excellent care. Hearing back from our patients is one way we can continue to improve our services. Please take a few minutes to complete the written survey that you may receive in the mail after your visit with us. Thank you!             Your Updated Medication List - Protect others around you: Learn how to safely use, store and throw away your medicines at www.disposemymeds.org.          This list is accurate as of 5/7/18  3:56 PM.  Always use your most recent med list.                   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    Type 1 diabetes mellitus without complication (H)       ZYRTEC PO           * Notice:  This list has 2 medication(s) that are the same as other medications prescribed for you. Read the directions carefully, and ask your doctor or other care provider to review them with you.

## 2018-05-07 NOTE — LETTER
5/7/2018         RE: Vinicius Jerome  [address] [address] [facility] [address]        Dear Colleague,    Thank you for referring your patient, Vinicius Jerome, to the [facility]. Please see a copy of my visit note below.    Diabetes Self Management Training: Insulin Pump Review Visit    Vinicius Jerome presents today for education related to Type 1 diabetes.    He is accompanied by self    Patient's diabetes management related comments/concerns: Medtronic pump is out of warranty and would like to know what options are available. Not sure interested in a sensor but would like to learn what is available.     ASSESSMENT:  Patient Problem List and Family Medical History reviewed for relevant medical history, current medical status, and diabetes risk factors.    Current Diabetes Management per Patient:  Insulin Pump Type: Medtronic 523/723    BG meter: One Touch meter    Taking other diabetes medications? no      Patient's most recent   Lab Results   Component Value Date    A1C 8.1 04/17/2018    is not meeting goal of <7.0      INTERVENTION:    Education provided today on:  Information provided on current  available insulin pump therapies, including Medtronic 670G closed loop system, Omnipod pump and Tandem insulin pump with Dexcom, advantages and disadvantages, and requirements for successful insulin pump therapy with each pump system were discussed. Hands on practice with pump button use each pump.     Opportunities for ongoing education and support in diabetes-self management were discussed.     Patientt verbalized understanding of concepts discussed and recommendations provided today.         Education Materials Provided:  Informational packets for Medtronic, Omnipod and Tandem insulin pumps, Dexcom and [employer].      PLAN:  Review packets and information provided at appointment today.    Call diabetes education when he decides to move forward with new or upgrading pump, will  start the paperwork process and set up appointment for pump start education and training.       Eli Chawla RN,CDE   Time Spent: 60 minutes  Encounter Type: Individual      Any diabetes medication dose changes were made via the CDE Protocol and Collaborative Practice Agreement with the patient's referring provider. A copy of this encounter was shared with the provider.

## 2018-07-17 ENCOUNTER — OFFICE VISIT (OUTPATIENT)
Dept: ENDOCRINOLOGY | Facility: CLINIC | Age: 48
End: 2018-07-17
Payer: COMMERCIAL

## 2018-07-17 VITALS
BODY MASS INDEX: 28.92 KG/M2 | HEIGHT: 70 IN | WEIGHT: 202 LBS | DIASTOLIC BLOOD PRESSURE: 76 MMHG | SYSTOLIC BLOOD PRESSURE: 116 MMHG | HEART RATE: 70 BPM

## 2018-07-17 DIAGNOSIS — E10.3393 TYPE 1 DIABETES MELLITUS WITH MODERATE NONPROLIFERATIVE RETINOPATHY OF BOTH EYES WITHOUT MACULAR EDEMA (H): Primary | ICD-10-CM

## 2018-07-17 DIAGNOSIS — Z96.41 INSULIN PUMP STATUS: ICD-10-CM

## 2018-07-17 LAB
ANION GAP SERPL CALCULATED.3IONS-SCNC: 6 MMOL/L (ref 3–14)
BUN SERPL-MCNC: 23 MG/DL (ref 7–30)
CALCIUM SERPL-MCNC: 9 MG/DL (ref 8.5–10.1)
CHLORIDE SERPL-SCNC: 106 MMOL/L (ref 94–109)
CHOLEST SERPL-MCNC: 137 MG/DL
CO2 SERPL-SCNC: 27 MMOL/L (ref 20–32)
CREAT SERPL-MCNC: 1.2 MG/DL (ref 0.66–1.25)
GFR SERPL CREATININE-BSD FRML MDRD: 64 ML/MIN/1.7M2
GLUCOSE SERPL-MCNC: 236 MG/DL (ref 70–99)
HBA1C MFR BLD: 7.8 % (ref 0–5.6)
HDLC SERPL-MCNC: 40 MG/DL
LDLC SERPL CALC-MCNC: 85 MG/DL
NONHDLC SERPL-MCNC: 97 MG/DL
POTASSIUM SERPL-SCNC: 4.2 MMOL/L (ref 3.4–5.3)
SODIUM SERPL-SCNC: 139 MMOL/L (ref 133–144)
TRIGL SERPL-MCNC: 60 MG/DL

## 2018-07-17 PROCEDURE — 80061 LIPID PANEL: CPT | Performed by: INTERNAL MEDICINE

## 2018-07-17 PROCEDURE — 80048 BASIC METABOLIC PNL TOTAL CA: CPT | Performed by: INTERNAL MEDICINE

## 2018-07-17 PROCEDURE — 83036 HEMOGLOBIN GLYCOSYLATED A1C: CPT | Performed by: INTERNAL MEDICINE

## 2018-07-17 PROCEDURE — 36415 COLL VENOUS BLD VENIPUNCTURE: CPT | Performed by: INTERNAL MEDICINE

## 2018-07-17 PROCEDURE — 99214 OFFICE O/P EST MOD 30 MIN: CPT | Performed by: INTERNAL MEDICINE

## 2018-07-17 NOTE — PROGRESS NOTES
Name: Vinicius Jerome      HPI:  Recent issues:  Here for f/u DM evaluation.  Feeling well overall, no new health issues reported  Has family vacations planned for [place] and also [place] soon        Diagnosis of diabetes mellitus age 14, living in [facility]  ... Initial treatment with insulin injections, using Humalog, NPH, and possibly Lantus insulins  2001. Switched to insulin pump management, Medtronic pump  Current pump Medtronic 523 pump   Novolog in pump  Recalls pump OOW now  Using OneTouch Ultra BG meter, tests 4x/day  Has used Medtronic CGMS years ago  Current pump settings:      Recent pump Carelink data:      Previous [facility] labs include:  Lab Results   Component Value Date    A1C 8.1 (H) 04/17/2018     04/17/2018    POTASSIUM 4.7 04/17/2018    CHLORIDE 107 04/17/2018    CO2 30 04/17/2018    ANIONGAP 2 (L) 04/17/2018     (H) 04/17/2018    BUN 20 04/17/2018    CR 1.08 04/17/2018    GFRESTIMATED 72 04/17/2018    GFRESTBLACK 88 04/17/2018    JOCE 8.7 04/17/2018    CHOL 126 03/18/2013    TRIG 39 03/18/2013    HDL 45 03/18/2013    LDL 73 03/18/2013    UCRR 136 04/17/2018    MICROL 5 04/17/2018    UMALCR 4.01 04/17/2018 5/7/18. Evaluation with [facility] CDE (VB)  DM Complications:  none  Last eye exam 9/2018 at [facility]      , 2-children.    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 for general medicine evaluations.    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) 12/17/2016     Type I (juvenile type) diabetes mellitus without mention of complication, not stated as uncontrolled      Past Surgical History:   Procedure Laterality Date     C  INCISION OF PYLORIC MUSCLE       C INSERT SUBCUT RESERV/PUMP/DEV       FINGER SURGERY         Family Hx:  Family History   Problem Relation Age of Onset     Cardiovascular Father      brain aneurysm- fully recovered     Cancer Mother      brain tumor - left her paralized on 1 side     Cerebrovascular Disease Maternal Grandmother      HEART DISEASE Paternal Grandfather      HEART DISEASE Paternal Grandmother          Social Hx:  Social History     Social History     Marital status:      Spouse name: N/A     Number of children: 2     Years of education: N/A     Occupational History     Not on file.     Social History Main Topics     Smoking status: Never Smoker     Smokeless tobacco: Never Used     Alcohol use No     Drug use: No     Sexual activity: Yes     Partners: Female     Other Topics Concern     Parent/Sibling W/ Cabg, Mi Or Angioplasty Before 65f 55m? No     Social History Narrative          MEDICATIONS:  has a current medication list which includes the following prescription(s): aspirin, blood glucose monitoring, cetirizine hcl, insulin infusion pump, LIPID LOWERING THERAPY NOT PRESCRIBED, INTENTIONAL,, multi vitamin  mens, novolog vial, and onetouch ultra.    ROS:     ROS: 10 point ROS neg other than the symptoms noted above in the HPI.    GENERAL: energy level good; no weight changes, denies fevers, chills, night sweats.   HEENT: no dysphagia, odonophagia, diplopia, neck pain  THYROID:  no apparent hyper or hypothyroid symptoms  CV: no chest pain, pressure, palpitations  LUNGS: no SOB, VARGAS, cough, wheezing   ABDOMEN: occasional heartburn; no diarrhea, constipation, abdominal pain  EXTREMITIES: no rashes, ulcers, edema  NEUROLOGY: no headaches, denies changes in vision, tingling, extremitiy numbness   MSK: no muscle aches or pains, weakness  SKIN: no rashes or lesions  : good erection function, denies nocturia  PSYCH:  stable mood, no significant anxiety or depression  ENDOCRINE: no heat or cold  "intolerance    Physical Exam   VS: /76 (BP Location: Right arm, Cuff Size: Adult Regular)  Pulse 70  Ht 1.778 m (5' 10\")  Wt 91.6 kg (202 lb)  BMI 28.98 kg/m2  GENERAL: AXOX3, NAD, well dressed, answering questions appropriately, appears stated age.  THYROID:  normal gland, no apparent nodules or goiter  HEENT: neck non-tender, no exopthalmous, EOMI  CV: RRR, no rubs, gallops, no murmurs  LUNGS: CTAB, no wheezes, rales, or ronchi  ABDOMEN: soft, nontender, nondistended  EXTREMITIES: no edema  NEUROLOGY: CN grossly intact, no tremors  MSK: grossly intact  SKIN: no rashes, no lesions    LABS:    All pertinent notes, labs, and images personally reviewed by me.     A/P:  Encounter Diagnoses   Name Primary?     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  Recent avg  mg/dl, often higher in evenings.    Plan:  Reviewed the overall T1DM management and insulin pump use.  Discussed optimal BG testing to assess glucose trends.  We reviewed insulin pump settings, basal rate and bolus dosing  Use of automated pump bolus dosing for meal/snack carb & correction dosing  Reviewed the [employer] Carelink report data today    Recommend:  Continue insulin pump management plan.  Pump setting changes:   Basals:  [place] 0.675 to 0.65   Bolus:  Add ISF 9p 60 mg/dl  Check nonfasting (pp- raisin bran cereal and grape juice) labs today  Will investigate the options for pump upgrade to 670G w/ sensor   Gave pump brochure today  Consider future use of Fiasp insulin  Discussed plan to take backup insulin vials and syringes during trip, use of lower pump Temp Basal during hiking   Will send Levemir vial Rx to his pharmacy  Arrange annual dilated eye exam, fasting lipid panel testing.    Keep regular followup appointments with PCP also  Addressed patient's questions today.    Labs " ordered today:   Orders Placed This Encounter   Procedures     Basic metabolic panel     Hemoglobin A1c     Lipid panel reflex to direct LDL Fasting     Radiology/Consults ordered today: None    More than 50% of the time spent with Mr. Jerome on counseling / coordinating his care.  Total appointment time was 30 minutes.    Follow-up:  3 mo    Julien Escalona MD  Endocrinology  [facility]

## 2018-07-17 NOTE — MR AVS SNAPSHOT
After Visit Summary   7/17/2018    Vinicius Jerome    MRN: [id number]           Patient Information     Date Of Birth          1970        Visit Information        Provider Department      7/17/2018 8:00 AM Julien Escalona MD [facility]        Today's Diagnoses     Type 1 diabetes mellitus with moderate nonproliferative retinopathy of both eyes without macular edema (H)    -  1    Insulin pump status           Follow-ups after your visit        Follow-up notes from your care team     Return in about 3 months (around 10/17/2018).      Your next 10 appointments already scheduled     Aug 13, 2018   Procedure with Cruz Momin MD   [facility] ([facility])    [address] [place] [address] [phone]           [facility] is located at [address] [place]              Who to contact     If you have questions or need follow up information about today's clinic visit or your schedule please contact [facility] directly at [phone].  Normal or non-critical lab and imaging results will be communicated to you by MyChart, letter or phone within 4 business days after the clinic has received the results. If you do not hear from us within 7 days, please contact the clinic through MyChart or phone. If you have a critical or abnormal lab result, we will notify you by phone as soon as possible.  Submit refill requests through [facility] or call your pharmacy and they will forward the refill request to us. Please allow 3 business days for your refill to be completed.          Additional Information About Your Visit        MyChart Information     [facility] gives you secure access to your electronic health record. If you see a primary care provider, you can also send messages to your care team and make appointments. If you have questions, please call your primary care clinic.  If you do not have a primary care provider,  "please call [phone] and they will assist you.        Care EveryWhere ID     This is your Care EveryWhere ID. This could be used by other organizations to access your [place] medical records  [id number]        Your Vitals Were     Pulse Height BMI (Body Mass Index)             70 1.778 m (5' 10\") 28.98 kg/m2          Blood Pressure from Last 3 Encounters:   07/17/18 116/76   04/17/18 128/81   04/26/17 142/85    Weight from Last 3 Encounters:   07/17/18 91.6 kg (202 lb)   04/17/18 92.1 kg (203 lb)   04/26/17 91.7 kg (202 lb 3.2 oz)              We Performed the Following     Basic metabolic panel     Hemoglobin A1c     Lipid panel reflex to direct LDL Fasting        Primary Care Provider Office Phone # Fax #    [facility] [phone] [address]  [facility] [address]        Equal Access to Services     KEVON VELA : Hadii aad ku hadasho Soomaali, waaxda luqadaha, qaybta kaalmada adeegyada, waxay leónin tacos machado . So [facility] [phone].    ATENCIÓN: Si habla espneda, tiene a christina disposición servicios gratuitos de asistencia lingüística. Llame al [phone].    We comply with applicable federal civil rights laws and [place] laws. We do not discriminate on the basis of race, color, national origin, age, disability, sex, sexual orientation, or gender identity.            Thank you!     Thank you for choosing [facility]  for your care. Our goal is always to provide you with excellent care. Hearing back from our patients is one way we can continue to improve our services. Please take a few minutes to complete the written survey that you may receive in the mail after your visit with us. Thank you!             Your Updated Medication List - Protect others around you: Learn how to safely use, store and throw away your medicines at www.disposemymeds.org.          This list is accurate as of 7/17/18  8:40 AM.  Always use your most recent med list. "                   Brand Name Dispense Instructions for use Diagnosis    aspirin 81 MG tablet          1 TABLET DAILY        insulin infusion pump Alexus      Use as directed        LIPID LOWERING THERAPY NOT PRESCRIBED (INTENTIONAL)     0 each    Lipid lowering therapy not prescribed intentionally due to Other pt is following up with endocrinology (This option does not exclude patient from measure)    Type 1 diabetes mellitus with moderate nonproliferative retinopathy of both eyes without macular edema (H)       MULTI vitamin  MENS Tabs       Routine general medical examination at a health care facility       NovoLOG VIAL 100 UNITS/ML injection   Generic drug:  insulin aspart     50 mL    Use with insulin pump, total daily dose 60 units, E10.9    Type 1 diabetes mellitus without complication (H)       * ONETOUCH ULTRA test strip   Generic drug:  blood glucose monitoring           * blood glucose monitoring test strip    no brand specified    400 strip    Use to test blood sugar 4 times daily or as directed.    Type 1 diabetes mellitus without complication (H)       ZYRTEC PO           * Notice:  This list has 2 medication(s) that are the same as other medications prescribed for you. Read the directions carefully, and ask your doctor or other care provider to review them with you.

## 2018-07-20 ENCOUNTER — MEDICAL CORRESPONDENCE (OUTPATIENT)
Dept: HEALTH INFORMATION MANAGEMENT | Facility: CLINIC | Age: 48
End: 2018-07-20

## 2018-07-26 ENCOUNTER — MYC MEDICAL ADVICE (OUTPATIENT)
Dept: ENDOCRINOLOGY | Facility: CLINIC | Age: 48
End: 2018-07-26

## 2018-08-08 RX ORDER — LIDOCAINE 40 MG/G
CREAM TOPICAL
Status: DISCONTINUED | OUTPATIENT
Start: 2018-08-08 | End: 2018-08-13 | Stop reason: HOSPADM

## 2018-08-08 RX ORDER — ONDANSETRON 2 MG/ML
4 INJECTION INTRAMUSCULAR; INTRAVENOUS
Status: DISCONTINUED | OUTPATIENT
Start: 2018-08-08 | End: 2018-08-13 | Stop reason: HOSPADM

## 2018-08-13 ENCOUNTER — HOSPITAL ENCOUNTER (OUTPATIENT)
Facility: CLINIC | Age: 48
Discharge: HOME | End: 2018-08-13
Attending: INTERNAL MEDICINE | Admitting: INTERNAL MEDICINE
Payer: COMMERCIAL

## 2018-08-13 VITALS
WEIGHT: 197 LBS | SYSTOLIC BLOOD PRESSURE: 108 MMHG | DIASTOLIC BLOOD PRESSURE: 72 MMHG | BODY MASS INDEX: 28.27 KG/M2 | OXYGEN SATURATION: 98 % | RESPIRATION RATE: 10 BRPM

## 2018-08-13 LAB
COLONOSCOPY: NORMAL
GLUCOSE BLDC GLUCOMTR-MCNC: 206 MG/DL (ref 70–99)

## 2018-08-13 PROCEDURE — 99153 MOD SED SAME PHYS/QHP EA: CPT | Performed by: INTERNAL MEDICINE

## 2018-08-13 PROCEDURE — G0121 COLON CA SCRN NOT HI RSK IND: HCPCS | Performed by: INTERNAL MEDICINE

## 2018-08-13 PROCEDURE — 45378 DIAGNOSTIC COLONOSCOPY: CPT | Performed by: INTERNAL MEDICINE

## 2018-08-13 PROCEDURE — G0500 MOD SEDAT ENDO SERVICE >5YRS: HCPCS | Performed by: INTERNAL MEDICINE

## 2018-08-13 PROCEDURE — 82962 GLUCOSE BLOOD TEST: CPT

## 2018-08-13 PROCEDURE — 25000128 H RX IP 250 OP 636: Performed by: INTERNAL MEDICINE

## 2018-08-13 RX ORDER — FENTANYL CITRATE 50 UG/ML
INJECTION, SOLUTION INTRAMUSCULAR; INTRAVENOUS PRN
Status: DISCONTINUED | OUTPATIENT
Start: 2018-08-13 | End: 2018-08-13 | Stop reason: HOSPADM

## 2018-08-13 RX ORDER — ONDANSETRON 2 MG/ML
4 INJECTION INTRAMUSCULAR; INTRAVENOUS
Status: COMPLETED | OUTPATIENT
Start: 2018-08-13 | End: 2018-08-13

## 2018-08-13 RX ORDER — LIDOCAINE 40 MG/G
CREAM TOPICAL
Status: DISCONTINUED | OUTPATIENT
Start: 2018-08-13 | End: 2018-08-13 | Stop reason: HOSPADM

## 2018-08-13 RX ORDER — FLUMAZENIL 0.1 MG/ML
0.2 INJECTION, SOLUTION INTRAVENOUS
Status: DISCONTINUED | OUTPATIENT
Start: 2018-08-13 | End: 2018-08-13 | Stop reason: HOSPADM

## 2018-08-13 RX ORDER — NALOXONE HYDROCHLORIDE 0.4 MG/ML
.1-.4 INJECTION, SOLUTION INTRAMUSCULAR; INTRAVENOUS; SUBCUTANEOUS
Status: DISCONTINUED | OUTPATIENT
Start: 2018-08-13 | End: 2018-08-13 | Stop reason: HOSPADM

## 2018-08-13 RX ORDER — ONDANSETRON 2 MG/ML
4 INJECTION INTRAMUSCULAR; INTRAVENOUS EVERY 6 HOURS PRN
Status: DISCONTINUED | OUTPATIENT
Start: 2018-08-13 | End: 2018-08-13 | Stop reason: HOSPADM

## 2018-08-13 RX ORDER — ONDANSETRON 4 MG/1
4 TABLET, ORALLY DISINTEGRATING ORAL EVERY 6 HOURS PRN
Status: DISCONTINUED | OUTPATIENT
Start: 2018-08-13 | End: 2018-08-13 | Stop reason: HOSPADM

## 2018-08-13 NOTE — IP AVS SNAPSHOT
MRN:[id number]                      After Visit Summary   8/13/2018    Vinicius Jerome    MRN: [id number]           Thank you!     Thank you for choosing [facility] for your care. Our goal is always to provide you with excellent care. Hearing back from our patients is one way we can continue to improve our services. Please take a few minutes to complete the written survey that you may receive in the mail after you visit. If you would like to speak to someone directly about your visit please contact Patient Relations at [phone]. Thank you!          Patient Information     Date Of Birth          1970        About your hospital stay     You were admitted on:  August 13, 2018 You last received care in the:  [facility]    You were discharged on:  August 13, 2018       Who to Call     For medical emergencies, please call 911.  For non-urgent questions about your medical care, please call your primary care provider or clinic, [phone]  For questions related to your surgery, please call your surgery clinic        Attending Provider     Provider Specialty    Link, Cruz LANG MD Gastroenterology       Primary Care Provider Office Phone # Fax #    [facility] [phone]      Your next 10 appointments already scheduled     Oct 23, 2018  9:00 AM CDT   Return Visit with Julien Escalona MD   [facility]    [address] [place] [id number] [phone]              Further instructions from your care team       The patient has received a copy of the Provation  report the doctor has written and discharge instructions have been discussed with the patient and responsible adult.  All questions were addressed and answered prior to patient discharge.    Pending Results     No orders found from 8/11/2018 to 8/14/2018.            Admission Information     Date & Time Provider Department  Dept. Phone    8/13/2018 Link, Cruz LANG MD [facility] [phone]      Your Vitals Were     Blood Pressure Respirations Weight Pulse Oximetry BMI (Body Mass Index)       120/79 14 89.4 kg (197 lb) 98% 28.27 kg/m2       MyChart Information     [employer] (Closed) gives you secure access to your electronic health record. If you see a primary care provider, you can also send messages to your care team and make appointments. If you have questions, please call your primary care clinic.  If you do not have a primary care provider, please call [phone] and they will assist you.        Care EveryWhere ID     This is your Care EveryWhere ID. This could be used by other organizations to access your [place] medical records  [id number]        Equal Access to Services     KEVON VELA : Niranjan Lang, alex carrillo, subhash kaaladriano jacobson, luciano prater. So [facility] [phone].    ATENCIÓN: Si habla español, tiene a christina disposición servicios gratuitos de asistencia lingüística. Llame al [phone].    We comply with applicable federal civil rights laws and [place] laws. We do not discriminate on the basis of race, color, national origin, age, disability, sex, sexual orientation, or gender identity.               Review of your medicines      UNREVIEWED medicines. Ask your doctor about these medicines        Dose / Directions    aspirin 81 MG tablet        1 TABLET DAILY   Quantity:      Refills:          LIPID LOWERING THERAPY NOT PRESCRIBED (INTENTIONAL)   Used for:  Type 1 diabetes mellitus with moderate nonproliferative retinopathy of both eyes without macular edema (H)        Lipid lowering therapy not prescribed intentionally due to Other pt is following up with endocrinology (This option does not exclude patient from measure)   Quantity:  0 each   Refills:  0       MULTI vitamin  MENS Tabs   Used for:  Routine general medical examination at a health care facility         Refills:  0       NovoLOG VIAL 100 UNITS/ML injection   Used for:  Type 1 diabetes mellitus without complication (H)   Generic drug:  insulin aspart        Use with insulin pump, total daily dose 60 units, E10.9   Quantity:  50 mL   Refills:  3       ZYRTEC PO        Refills:  0         CONTINUE these medicines which have NOT CHANGED        Dose / Directions    insulin infusion pump Alexus        Use as directed   Refills:  0       * ONETOUCH ULTRA test strip   Generic drug:  blood glucose monitoring        Refills:  0       * blood glucose monitoring test strip   Commonly known as:  no brand specified   Used for:  Type 1 diabetes mellitus without complication (H)        Use to test blood sugar 4 times daily or as directed.   Quantity:  400 strip   Refills:  3       * Notice:  This list has 2 medication(s) that are the same as other medications prescribed for you. Read the directions carefully, and ask your doctor or other care provider to review them with you.             Protect others around you: Learn how to safely use, store and throw away your medicines at www.disposemymeds.org.             Medication List: This is a list of all your medications and when to take them. Check marks below indicate your daily home schedule. Keep this list as a reference.      Medications           Morning Afternoon Evening Bedtime As Needed    aspirin 81 MG tablet   1 TABLET DAILY                                insulin infusion pump Alexus   Use as directed                                LIPID LOWERING THERAPY NOT PRESCRIBED (INTENTIONAL)   Lipid lowering therapy not prescribed intentionally due to Other pt is following up with endocrinology (This option does not exclude patient from measure)                                MULTI vitamin  MENS Tabs                                NovoLOG VIAL 100 UNITS/ML injection   Use with insulin pump, total daily dose 60 units, E10.9   Generic drug:  insulin aspart                                 * ONETOUCH ULTRA test strip   Generic drug:  blood glucose monitoring                                * blood glucose monitoring test strip   Commonly known as:  no brand specified   Use to test blood sugar 4 times daily or as directed.                                ZYRTEC PO                                * Notice:  This list has 2 medication(s) that are the same as other medications prescribed for you. Read the directions carefully, and ask your doctor or other care provider to review them with you.

## 2018-08-13 NOTE — H&P
Pre-Endoscopy History and Physical     Vinicius Jerome MRN# [id number]   YOB: 1970 Age: 48 year old     Date of Procedure: 8/13/2018  Primary care provider: Kenton Diaz  Type of Endoscopy: colonoscopy  Reason for Procedure: screening  Type of Anesthesia Anticipated: Conscious Sedation    HPI:    Vinicius is a 48 year old male who will be undergoing the above procedure.      A history and physical has been performed. The patient's medications and allergies have been reviewed. The risks and benefits of the procedure and the sedation options and risks were discussed with the patient.  All questions were answered and informed consent was obtained.      He denies a personal or family history of anesthesia complications or bleeding disorders.     Patient Active Problem List   Diagnosis     Internal Hemorrhoids     CARDIOVASCULAR SCREENING; LDL GOAL LESS THAN 100     Type 1 diabetes mellitus with moderate nonproliferative retinopathy of both eyes without macular edema (H)     Insulin pump status        Past Medical History:   Diagnosis Date     Congenital hypertrophic pyloric stenosis     surgically corrected as a child     Type 1 diabetes mellitus with moderate nonproliferative diabetic retinopathy and without macular edema (H) 12/17/2016     Type 1 diabetes mellitus with moderate nonproliferative retinopathy of both eyes without macular edema (H) 12/17/2016     Type I (juvenile type) diabetes mellitus without mention of complication, not stated as uncontrolled         Past Surgical History:   Procedure Laterality Date     C INCISION OF PYLORIC MUSCLE       C INSERT SUBCUT RESERV/PUMP/DEV       FINGER SURGERY         Relevant Family History: NONE    Relevant Social History: NONE     Prior to Admission medications    Medication Sig Start Date End Date Taking? Authorizing Provider   ASPIRIN 81 MG OR TABS 1 TABLET DAILY 10/21/03      blood glucose monitoring (NO BRAND SPECIFIED) test strip Use  "to test blood sugar 4 times daily or as directed. 4/17/18   Julien Escalona MD   Cetirizine HCl (ZYRTEC PO)     Reported, Patient   INSULIN INFUSION PUMP SIMI Use as directed    Reported, Patient   LIPID LOWERING THERAPY NOT PRESCRIBED, INTENTIONAL, Lipid lowering therapy not prescribed intentionally due to Other pt is following up with endocrinology (This option does not exclude patient from measure) 12/17/16   Lito Figueroa MD   MULTI VITAMIN MENS OR TABS  12/30/05   Fran Rothman MD   NOVOLOG VIAL 100 UNIT/ML soln Use with insulin pump, total daily dose 60 units, E10.9 4/17/18   Julien Ecsalona MD   ONE TOUCH ULTRA test strip  9/27/16   Reported, Patient       Allergies   Allergen Reactions     No Known Drug Allergies         REVIEW OF SYSTEMS:   A relevant review of systems was performed and was negative    PHYSICAL EXAM:   /74  Resp 11  Wt 89.4 kg (197 lb)  SpO2 98%  BMI 28.27 kg/m2 Estimated body mass index is 28.27 kg/(m^2) as calculated from the following:    Height as of 7/17/18: 1.778 m (5' 10\").    Weight as of this encounter: 89.4 kg (197 lb).   GENERAL APPEARANCE: alert, and oriented  MENTAL STATUS: alert  AIRWAY EXAM: Normal  RESP: lungs clear to auscultation - no rales, rhonchi or wheezes  CV: regular rates and rhythm  DIAGNOSTICS:    Not indicated    IMPRESSION   ASA Class 2 - Mild systemic disease    PLAN:   Plan for colonoscopy. We discussed the risks, benefits and alternatives and the patient wished to proceed.      Signed Electronically by: Cruz Momin  August 13, 2018            "

## 2018-08-13 NOTE — LETTER
July 19, 2018      Vinicius Jerome  [address] [facility] [address]        Thank you for choosing [facility]. You are scheduled for the following service.     Date:  8/13/2018 Monday             Procedure:  COLONOSCOPY  Doctor:        Dr. Cruz Momin   Arrival Time:  8:00 AM  *Check in at Emergency/Endoscopy desk*  Procedure Time:  8:30 AM    Location:   [facility]        Endoscopy Department, First Floor (Enter through ER Doors) *        [address] [place], [place] [address]      [phone] or [phone] () to reschedule      MIRALAX -GATORADE  PREP  Colonoscopy is the most accurate test to detect colon polyps and colon cancer; and the only test where polyps can be removed. During this procedure, a doctor examines the lining of your large intestine and rectum through a flexible tube.       Transportation  Arrange for a ride for the day of your procedure with a responsible adult.  A taxi ride is not an option unless you are accompanied by a responsible adult. If you fail to arrange transportation with a responsible adult, your procedure will be cancelled and rescheduled.    Purchase the  following supplies at your local pharmacy:  - 2 (two) bisacodyl tablets: each tablet contains 5 mg.  (Dulcolax  laxative NOT Dulcolax  stool softener)   - 1 (one) 8.3 oz bottle of Polyethylene Glycol (PEG) 3350 Powder   (MiraLAX , Smooth LAX , ClearLAX  or equivalent)  - 64 oz Gatorade    Regular Gatorade, Gatorade G2 , Powerade , Powerade Zero  or Pedialyte  is acceptable. Red colored flavors are not allowed; all other colors (yellow, green, orange, purple and blue) are okay. It is also okay to buy two 2.12 oz packets of powdered Gatorade that can be mixed with water to a total volume of 64 oz of liquid.  - 1 (one) 10 oz bottle of Magnesium Citrate (Red colored flavors are not allowed)  It is also okay for you to use a 0.5 oz package of powdered  magnesium citrate (17 g) mixed with 10 oz of water.    PREPARATION FOR COLONOSCOPY    7 days before:    Discontinue fiber supplements and medications containing iron. This includes Metamucil  and Fibercon ; and multivitamins with iron.  3 days before:    Begin a low-fiber diet. A low-fiber diet helps making the cleanout more effective.     Examples of a low-fiber diet include (but are not limited to): white bread, white rice, pasta, crackers, fish, chicken, eggs, ground beef, creamy peanut butter, cooked/steamed/boiled vegetables, canned fruit, bananas, melons, milk, plain yogurt cheese, salad dressing and other condiments.     The following are not allowed on a low-fiber diet: seeds, nuts, popcorn, bran, whole wheat, corn, quinoa, raw fruits and vegetables, berries and dried fruit, beans and lentils.    For additional details on low-fiber diet, please refer to the table on the last page.  2 days before:    Continue the low-fiber diet.     Drink at least 8 glasses of water throughout the day.     Stop eating solid foods at 11:45 pm.  1 day before:    In the morning: begin a clear liquid diet (liquids you can see through).     Examples of a clear liquid diet include: water, clear broth or bouillon, Gatorade, Pedialyte or Powerade, carbonated and non-carbonated soft drinks (Sprite , 7-Up , ginger ale), strained fruit juices without pulp (apple, white grape, white cranberry), Jell-O  and popsicles.     The following are not allowed on a clear liquid diet: red liquids, alcoholic beverages, dairy products (milk, creamer, and yogurt), protein shakes, creamy broths, juice with pulp and chewing tobacco.    At noon: take 2 (two) bisacodyl tablets     At 4 (and no later than 6pm): start drinking the Miralax-Gatorade preparation (8.3 oz of Miralax mixed with 64 oz of Gatorade in a large pitcher). Drink 1(one) 8 oz glass every 15 minutes thereafter, until the mixture is gone.    COLON CLEANSING TIPS: drink adequate amounts of  fluids before and after your colon cleansing to prevent dehydration. Stay near a toilet because you will have diarrhea. Even if you are sitting on the toilet, continue to drink the cleansing solution every 15 minutes. If you feel nauseous or vomit, rinse your mouth with water, take a 15 to 30-minute-break and then continue drinking the solution. You will be uncomfortable until the stool has flushed from your colon (in about 2 to 4 hours). You may feel chilled.    Day of your procedure  You may take all of your morning medications including blood pressure medications, blood thinners (if you have not been instructed to stop these by our office), methadone, anti-seizure medications with sips of water 3 hours prior to your procedure or earlier. Do not take insulin or vitamins prior to your procedure. Continue the clear liquid diet.   4 hours prior: drink 10 oz of magnesium citrate. It may be easier to drink it with a straw.    STOP consuming all liquids after that.     Do not take anything by mouth during this time.     Allow extra time to travel to your procedure as you may need to stop and use a restroom along the way.  You are ready for the procedure, if you followed all instructions and your stool is no longer formed, but clear or yellow liquid. If you are unsure whether your colon is clean, please call our office at [phone] before you leave for your appointment.  Bring the following to your procedure:  - Insurance Card/Photo ID.   - List of current medications including over-the-counter medications and supplements.   - Your rescue inhaler if you currently use one to control asthma.      Canceling or rescheduling your appointment:   If you must cancel or reschedule your appointment, please call [phone] as soon as possible.      COLONOSCOPY PRE-PROCEDURE CHECKLIST  If you have diabetes, ask your regular doctor for diet and medication restrictions.  If you take an anticoagulant or anti-platelet medication  (such as Coumadin , Lovenox , Pradaxa , Xarelto , Eliquis , etc.), please call your primary doctor for advice on holding this medication.  If you take aspirin you may continue to do so.  If you are or may be pregnant, please discuss the risks and benefits of this procedure with your doctor.          What happens during a colonoscopy?    Plan to spend up to two hours, starting at registration time, at the endoscopy center the day of your procedure. The colonoscopy takes an average of 15 to 30 minutes. Recovery time is about 30 minutes.    Before the exam:    You will change into a gown.    Your medical history and medication list will be reviewed with you, unless that has been done over the phone prior to the procedure.     A nurse will insert an intravenous (IV) line into your hand or arm.    The doctor will meet with you and will give you a consent form to sign.    During the exam:     Medicine will be given through the IV line to help you relax.     Your heart rate and oxygen levels will be monitored. If your blood pressure is low, you may be given fluids through the IV line.     The doctor will insert a flexible hollow tube, called a colonoscope, into your rectum. The scope will be advanced slowly through the large intestine (colon).    You may have a feeling of fullness or pressure.     If an abnormal tissue or a polyp is found, the doctor may remove it through the endoscope for closer examination, or biopsy. Tissue removal is painless    After the exam:           Any tissue samples removed during the exam will be sent to a lab for evaluation. It may take 5-7 working days for you to be notified of the results.     A nurse will provide you with complete discharge instructions before you leave the endoscopy center. Be sure to ask the nurse for specific instructions if you take blood thinners such as Aspirin, Coumadin or Plavix.     The doctor will prepare a full report for you and for the physician who referred  you for the procedure.     Your doctor will talk with you about the initial results of your exam.      Medication given during the exam will prohibit you from driving for the rest of the day.     Following the exam, you may resume your normal diet. Your first meal should be light, no greasy foods. Avoid alcohol until the next day.     You may resume your regular activities the day after the procedure.     LOW-FIBER DIET    Foods RECOMMENDED Foods to AVOID   Breads, Cereal, Rice and Pasta:   White bread, rolls, biscuits, croissant and willy toast.   Waffles, Georgian toast and pancakes.   White rice, noodles, pasta, macaroni and peeled cooked potatoes.   Plain crackers and saltines.   Cooked cereals: farina, cream of rice.   Cold cereals: Puffed Rice , Rice Krispies , Corn Flakes  and Special K    Breads, Cereal, Rice and Pasta:   Breads or rolls with nuts, seeds or fruit.   Whole wheat, pumpernickel, rye breads and cornbread.   Potatoes with skin, brown or wild rice, and kasha (buckwheat).     Vegetables:   Tender cooked and canned vegetables without seeds: carrots, asparagus tips, green or wax beans, pumpkin, spinach, lima beans. Vegetables:   Raw or steamed vegetables.   Vegetables with seeds.   Sauerkraut.   Winter squash, peas, broccoli, Brussel sprouts, cabbage, onions, cauliflower, baked beans, peas and corn.   Fruits:   Strained fruit juice.   Canned fruit, except pineapple.   Ripe bananas and melon. Fruits:   Prunes and prune juice.   Raw fruits.   Dried fruits: figs, dates and raisins.   Milk/Dairy:   Milk: plain or flavored.   Yogurt, custard and ice cream.   Cheese and cottage cheese Milk/Dairy:     Meat and other proteins:   ground, well-cooked tender beef, lamb, ham, veal, pork, fish, poultry and organ meats.   Eggs.   Peanut butter without nuts. Meat and other proteins:   Tough, fibrous meats with gristle.   Dry beans, peas and lentils.   Peanut butter with nuts.   Tofu.   Fats, Snack, Sweets, Condiments  and Beverages:   Margarine, butter, oils, mayonnaise, sour cream and salad dressing, plain gravy.   Sugar, hard candy, clear jelly, honey and syrup.   Spices, cooked herbs, bouillon, broth and soups made with allowed vegetable, ketchup and mustard.   Coffee, tea and carbonated drinks.   Plain cakes, cookies and pretzels.   Gelatin, plain puddings, custard, ice cream, sherbet and popsicles. Fats, Snack, Sweets, Condiments and Beverages:   Nuts, seeds and coconut.   Jam, marmalade and preserves.   Pickles, olives, relish and horseradish.   All desserts containing nuts, seeds, dried fruit and coconut; or made from whole grains or bran.   Candy made with nuts or seeds.   Popcorn.                     DIRECTIONS TO THE ENDOSCOPY DEPARTMENT     From the north ([facility])  Take [address] South, exit on [address] [address]. Get into the left hand milagro, turn left (east), go one-half mile to [address] and turn left. Go north to the first stoplight, take a right on [address] and follow it to the Emergency entrance.    From the south ([facility])  Take 35N to the [address]E split and exit on [address] [address]. On [address] [address], turn left (west) to [address]. Turn right (north) on [address]. Go north to the first stoplight, take a right on [address] and follow it to the Emergency entrance.    From the east via 35E ([facility])  Take [address] south to [address] [address] exit. Turn right on [address] . Go west to [address]. Turn right (north) on [address]. Go to the first stoplight, take a right and follow on [address] to the Emergency entrance.    From the east via [address] ([facility])  Take [address] to [address]. Turn left (south) on [address] to [address]. Turn left (east) on [address] and follow it to the Emergency entrance.    From the west via [address] ([place], [place])  Take [address] east to [address]. Turn right  (south) on [address] to [address]. Turn left (east) on [address] and follow it to the Emergency entrance.

## 2018-08-27 ENCOUNTER — TELEPHONE (OUTPATIENT)
Dept: ENDOCRINOLOGY | Facility: CLINIC | Age: 48
End: 2018-08-27

## 2018-08-27 DIAGNOSIS — E10.9 TYPE 1 DIABETES MELLITUS WITHOUT COMPLICATION (H): Primary | ICD-10-CM

## 2018-08-27 DIAGNOSIS — Z96.41 INSULIN PUMP STATUS: ICD-10-CM

## 2018-08-27 NOTE — TELEPHONE ENCOUNTER
Reason for Call:  Medication or medication refill:    Do you use a Home-Account Pharmacy?  Name of the pharmacy and phone number for the current request:       [facility] - [place], [place] - [facility] [address]    Name of the medication requested: Contour Next Test Strips    Other request: none    Can we leave a detailed message on this number? YES    Phone number patient can be reached at: Home number on file [phone] (home)    Best Time: anytime    Call taken on 8/27/2018 at 9:42 AM by Rigoberto Boles

## 2018-08-27 NOTE — TELEPHONE ENCOUNTER
Dr. Escalona,    Please see message below. Pt would like Contour Next Test Strips. Previously using One Touch Ultra test strips.   Please order if appropriate. Pharm pended.    Thank you,  William NEVES RN

## 2018-08-28 NOTE — TELEPHONE ENCOUNTER
Message noted.  The Leana Contour Next test strip Rx now sent to his pharmacy as requested.    CACHORRO Escalona MD  Endocrinology   Clinics [place]

## 2018-09-05 ENCOUNTER — TELEPHONE (OUTPATIENT)
Dept: ENDOCRINOLOGY | Facility: CLINIC | Age: 48
End: 2018-09-05

## 2018-09-05 NOTE — TELEPHONE ENCOUNTER
Medication:  Contour Next Test Strips          Other Information:          [facility]  Phone: [phone]  Fax: [phone]

## 2018-09-05 NOTE — TELEPHONE ENCOUNTER
PA Initiation    Medication: Contour Next Test Strips  Insurance Company: [employer] - Phone [phone] Fax [phone]  Pharmacy Filling the Rx: [facility] - [place], [place] - [facility] [address]  Filling Pharmacy Phone: [phone]  Filling Pharmacy Fax:    Start Date: 9/5/2018    THIS HAS BEEN SUBMITTED BY THE PRIOR-AUTHORIZATION TEAM. ANY QUESTIONS PLEASE CALL [phone]. THANK YOU

## 2018-09-07 NOTE — TELEPHONE ENCOUNTER
Prior Authorization Approval    Authorization Effective Date: 9/7/2018  Authorization Expiration Date: 9/7/2019  Medication: Contour Next Test Strips APPROVED   Approved Dose/Quantity:   Reference #:     Insurance Company: [employer] - Phone [phone] Fax [phone]  Expected CoPay:       CoPay Card Available:      Foundation Assistance Needed:    Which Pharmacy is filling the prescription (Not needed for infusion/clinic administered): [facility] - [place], [place] - [facility] [address]  Pharmacy Notified: Yes  Patient Notified: Yes

## 2018-10-03 ENCOUNTER — TRANSFERRED RECORDS (OUTPATIENT)
Dept: HEALTH INFORMATION MANAGEMENT | Facility: CLINIC | Age: 48
End: 2018-10-03

## 2018-10-23 ENCOUNTER — OFFICE VISIT (OUTPATIENT)
Dept: ENDOCRINOLOGY | Facility: CLINIC | Age: 48
End: 2018-10-23
Payer: COMMERCIAL

## 2018-10-23 VITALS
HEIGHT: 70 IN | WEIGHT: 202.9 LBS | DIASTOLIC BLOOD PRESSURE: 80 MMHG | SYSTOLIC BLOOD PRESSURE: 128 MMHG | BODY MASS INDEX: 29.05 KG/M2 | HEART RATE: 65 BPM

## 2018-10-23 DIAGNOSIS — E10.9 TYPE 1 DIABETES MELLITUS WITHOUT COMPLICATION (H): ICD-10-CM

## 2018-10-23 DIAGNOSIS — Z96.41 INSULIN PUMP STATUS: Primary | ICD-10-CM

## 2018-10-23 PROBLEM — Z86.39 HISTORY OF DIABETIC RETINOPATHY: Status: ACTIVE | Noted: 2018-10-23

## 2018-10-23 LAB
ANION GAP SERPL CALCULATED.3IONS-SCNC: 8 MMOL/L (ref 3–14)
BUN SERPL-MCNC: 27 MG/DL (ref 7–30)
CALCIUM SERPL-MCNC: 9.6 MG/DL (ref 8.5–10.1)
CHLORIDE SERPL-SCNC: 106 MMOL/L (ref 94–109)
CO2 SERPL-SCNC: 27 MMOL/L (ref 20–32)
CREAT SERPL-MCNC: 1.22 MG/DL (ref 0.66–1.25)
GFR SERPL CREATININE-BSD FRML MDRD: 63 ML/MIN/1.7M2
GLUCOSE SERPL-MCNC: 45 MG/DL (ref 70–99)
HBA1C MFR BLD: 7.1 % (ref 0–5.6)
POTASSIUM SERPL-SCNC: 4.4 MMOL/L (ref 3.4–5.3)
SODIUM SERPL-SCNC: 141 MMOL/L (ref 133–144)

## 2018-10-23 PROCEDURE — 95251 CONT GLUC MNTR ANALYSIS I&R: CPT | Performed by: INTERNAL MEDICINE

## 2018-10-23 PROCEDURE — 36415 COLL VENOUS BLD VENIPUNCTURE: CPT | Performed by: INTERNAL MEDICINE

## 2018-10-23 PROCEDURE — 80048 BASIC METABOLIC PNL TOTAL CA: CPT | Performed by: INTERNAL MEDICINE

## 2018-10-23 PROCEDURE — 83036 HEMOGLOBIN GLYCOSYLATED A1C: CPT | Performed by: INTERNAL MEDICINE

## 2018-10-23 PROCEDURE — 99214 OFFICE O/P EST MOD 30 MIN: CPT | Performed by: INTERNAL MEDICINE

## 2018-10-23 NOTE — MR AVS SNAPSHOT
"              After Visit Summary   10/23/2018    Vinicius Jerome    MRN: [id number]           Patient Information     Date Of Birth          1970        Visit Information        Provider Department      10/23/2018 9:00 AM Julien Escalona MD [facility]        Today's Diagnoses     Insulin pump status    -  1    Type 1 diabetes mellitus without complication (H)           Follow-ups after your visit        Follow-up notes from your care team     Return in about 3 months (around 1/23/2019).      Who to contact     If you have questions or need follow up information about today's clinic visit or your schedule please contact [facility] directly at [phone].  Normal or non-critical lab and imaging results will be communicated to you by MyChart, letter or phone within 4 business days after the clinic has received the results. If you do not hear from us within 7 days, please contact the clinic through tenfarmshart or phone. If you have a critical or abnormal lab result, we will notify you by phone as soon as possible.  Submit refill requests through [employer] or call your pharmacy and they will forward the refill request to us. Please allow 3 business days for your refill to be completed.          Additional Information About Your Visit        MyChart Information     [employer] gives you secure access to your electronic health record. If you see a primary care provider, you can also send messages to your care team and make appointments. If you have questions, please call your primary care clinic.  If you do not have a primary care provider, please call [phone] and they will assist you.        Care EveryWhere ID     This is your Care EveryWhere ID. This could be used by other organizations to access your [place] medical records  [id number]        Your Vitals Were     Pulse Height BMI (Body Mass Index)             65 1.778 m (5' 10\") 29.11 kg/m2          Blood Pressure from Last 3 Encounters: "   10/23/18 128/80   08/13/18 108/72   07/17/18 116/76    Weight from Last 3 Encounters:   10/23/18 92 kg (202 lb 14.4 oz)   08/13/18 89.4 kg (197 lb)   07/17/18 91.6 kg (202 lb)              We Performed the Following     Basic metabolic panel     GLUCOSE MONITOR, 72 HOUR, PHYS INTERP     Hemoglobin A1c        Primary Care Provider Office Phone # Fax #    K[facility] Mays [facility] [phone] [address] [facility] [address]        Equal Access to Services     [facility] Center[facility] : Hadii aad ku hadasho Soomaali, waaxda luqadaha, qaybta kaalmada adetongyahi, luciano machado . So [facility] [place] [phone].    ATENCIÓN: Si habla español, tiene a christina disposición servicios gratuitos de asistencia lingüística. Llame al [phone].    We comply with applicable federal civil rights laws and [place] laws. We do not discriminate on the basis of race, color, national origin, age, disability, sex, sexual orientation, or gender identity.            Thank you!     Thank you for choosing [facility]  for your care. Our goal is always to provide you with excellent care. Hearing back from our patients is one way we can continue to improve our services. Please take a few minutes to complete the written survey that you may receive in the mail after your visit with us. Thank you!             Your Updated Medication List - Protect others around you: Learn how to safely use, store and throw away your medicines at www.disposemymeds.org.          This list is accurate as of 10/23/18  5:30 PM.  Always use your most recent med list.                   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    Type 1 diabetes mellitus without complication (H)       * CONTOUR NEXT TEST test strip   Generic drug:  blood glucose monitoring     400 strip    Use to test blood sugar 4 times daily or as directed, Leana Contour Next strips, E10.9    Type 1 diabetes mellitus without complication (H), Insulin pump status       ZYRTEC PO           * Notice:  This list has 3 medication(s) that are the same as other medications prescribed for you. Read the directions carefully, and ask your doctor or other care provider to review them with you.

## 2018-10-23 NOTE — PROGRESS NOTES
Name: Vinicius Jerome      HPI:  Recent issues:  Here for f/u DM evaluation.  Now using new Medtronic pump and sensor, likes it  Feeling well overall, no new health issues reported        Diagnosis of diabetes mellitus age 14, living in [facility]  ... Initial treatment with insulin injections, using Humalog, NPH, and possibly Lantus insulins  2001. Switched to insulin pump management, Medtronic pump  Has used Medtronic CGMS years ago  Previously used the Medtronic 523 pump  8/2018. Upgraded to Medtronic 670G with Guardian3 sensor   Novolog in pump  Using OneTouch Ultra BG meter, tests 4x/day  Current pump settings:      Recent pump Carelink data:       Previous  labs include:  Lab Results   Component Value Date    A1C 7.1 (H) 10/23/2018     10/23/2018    POTASSIUM 4.4 10/23/2018    CHLORIDE 106 10/23/2018    CO2 27 10/23/2018    ANIONGAP 8 10/23/2018    GLC 45 (LL) 10/23/2018    BUN 27 10/23/2018    CR 1.22 10/23/2018    GFRESTIMATED 63 10/23/2018    GFRESTBLACK 76 10/23/2018    JOCE 9.6 10/23/2018    CHOL 137 07/17/2018    TRIG 60 07/17/2018    HDL 40 07/17/2018    LDL 85 07/17/2018    NHDL 97 07/17/2018    UCRR 136 04/17/2018    MICROL 5 04/17/2018    UMALCR 4.01 04/17/2018 5/7/18. Evaluation with  CDE (VB)  Last eye exam 9/2018 at [facility]  DM Complications:  none        , 2-children.    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, [employer] office  Previous gen me evaluations at [facility]      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) 12/17/2016     Type I (juvenile type) diabetes mellitus without mention of complication, not stated as uncontrolled       Past Surgical History:   Procedure Laterality Date     C INCISION OF PYLORIC MUSCLE       C INSERT SUBCUT RESERV/PUMP/DEV       COLONOSCOPY N/A 8/13/2018    Procedure: COLONOSCOPY;  COLONOSCOPY;  Surgeon: Cruz Momin MD;  Location: [facility]     FINGER SURGERY         Family Hx:  Family History   Problem Relation Age of Onset     Cardiovascular Father      brain aneurysm- fully recovered     Cancer Mother      brain tumor - left her paralized on 1 side     Cerebrovascular Disease Maternal Grandmother      HEART DISEASE Paternal Grandfather      HEART DISEASE Paternal Grandmother          Social Hx:  Social History     Social History     Marital status:      Spouse name: N/A     Number of children: 2     Years of education: N/A     Occupational History     Not on file.     Social History Main Topics     Smoking status: Never Smoker     Smokeless tobacco: Never Used     Alcohol use No     Drug use: No     Sexual activity: Yes     Partners: Female     Other Topics Concern     Parent/Sibling W/ Cabg, Mi Or Angioplasty Before 65f 55m? No     Social History Narrative          MEDICATIONS:  has a current medication list which includes the following prescription(s): blood glucose monitoring, cetirizine hcl, contour next test, insulin infusion pump, multi vitamin  mens, novolog vial, onetouch ultra, aspirin, and LIPID LOWERING THERAPY NOT PRESCRIBED, INTENTIONAL,.    ROS:     ROS: 10 point ROS neg other than the symptoms noted above in the HPI.    GENERAL: energy good; no weight changes, denies fevers, chills, night sweats.   HEENT: no dysphagia, odonophagia, diplopia, neck pain  THYROID:  no apparent hyper or hypothyroid symptoms  CV: no chest pain, pressure, palpitations  LUNGS: no SOB, VARGAS, cough, wheezing   ABDOMEN: occasional heartburn; no diarrhea, constipation, abdominal pain  EXTREMITIES: no rashes, ulcers, edema  NEUROLOGY: no headaches, denies changes in vision, tingling, extremitiy numbness   MSK: no  "muscle aches or pains, weakness  SKIN: no rashes or lesions  : good erection function, denies nocturia  PSYCH:  stable mood, no significant anxiety or depression  ENDOCRINE: no heat or cold intolerance    Physical Exam   VS: /80  Pulse 65  Ht 1.778 m (5' 10\")  Wt 92 kg (202 lb 14.4 oz)  BMI 29.11 kg/m2  GENERAL: AXOX3, NAD, well dressed, answering questions appropriately, appears stated age.  THYROID:  normal gland, no apparent nodules or goiter  ABDOMEN: soft, nontender, nondistended  NEUROLOGY: CN grossly intact, no tremors  MSK: grossly intact  SKIN: no rashes, no lesions    LABS:    All pertinent notes, labs, and images personally reviewed by me.     A/P:  Encounter Diagnoses   Name Primary?     Type 1 diabetes mellitus without complication (H)      Insulin pump status Yes     Comments:  Reviewed health history and diabetes issues.    Plan:  Reviewed the overall T1DM management and insulin pump use.  Discussed optimal BG testing to assess glucose trends.  We reviewed insulin pump settings, basal rate and bolus dosing  Use of automated pump bolus dosing for meal/snack carb & correction dosing  Reviewed the [employer] Carelink report data today  Reviewed the recent Carelink Guardian3 CGMS glucose trend report, in detail    Recommend:  Continue the current insulin pump and glucose sensor management plan.  No insulin pump setting changes at this time.  Discussed idea of Fiasp insulin use to offset the postmeal spike in glucose levels.   Gave Fiasp sample vial today   Monitor CGMS trends and send MyChart update to me in 1-2 weeks  Check labs today  Resume taking baby aspirin tablet daily  Arrange annual dilated eye exam, fasting lipid panel testing.    Keep regular followup appointments with PCP also  Addressed patient's questions today.    Labs ordered today:   Orders Placed This Encounter   Procedures     GLUCOSE MONITOR, 72 HOUR, PHYS INTERP     Basic metabolic panel     Hemoglobin A1c "     Radiology/Consults ordered today: None    More than 50% of the time spent with Mr. Jerome on counseling / coordinating his care.  Total appointment time was 30 minutes.    Follow-up:  3 mo    Julien Escalona MD  Endocrinology  [facility]

## 2019-02-11 ENCOUNTER — OFFICE VISIT (OUTPATIENT)
Dept: ENDOCRINOLOGY | Facility: CLINIC | Age: 49
End: 2019-02-11
Payer: COMMERCIAL

## 2019-02-11 VITALS
HEART RATE: 70 BPM | BODY MASS INDEX: 29.35 KG/M2 | SYSTOLIC BLOOD PRESSURE: 123 MMHG | DIASTOLIC BLOOD PRESSURE: 80 MMHG | HEIGHT: 70 IN | WEIGHT: 205 LBS

## 2019-02-11 DIAGNOSIS — E10.9 TYPE 1 DIABETES MELLITUS WITHOUT COMPLICATION (H): Primary | ICD-10-CM

## 2019-02-11 DIAGNOSIS — Z96.41 INSULIN PUMP STATUS: ICD-10-CM

## 2019-02-11 LAB — HBA1C MFR BLD: 7.3 % (ref 0–5.6)

## 2019-02-11 PROCEDURE — 36415 COLL VENOUS BLD VENIPUNCTURE: CPT | Performed by: INTERNAL MEDICINE

## 2019-02-11 PROCEDURE — 83036 HEMOGLOBIN GLYCOSYLATED A1C: CPT | Performed by: INTERNAL MEDICINE

## 2019-02-11 PROCEDURE — 95251 CONT GLUC MNTR ANALYSIS I&R: CPT | Performed by: INTERNAL MEDICINE

## 2019-02-11 PROCEDURE — 80048 BASIC METABOLIC PNL TOTAL CA: CPT | Performed by: INTERNAL MEDICINE

## 2019-02-11 PROCEDURE — 99214 OFFICE O/P EST MOD 30 MIN: CPT | Performed by: INTERNAL MEDICINE

## 2019-02-11 ASSESSMENT — MIFFLIN-ST. JEOR: SCORE: 1796.12

## 2019-02-11 NOTE — PROGRESS NOTES
Name: Vinicius Jerome      HPI:  Recent issues:  Here for f/u DM evaluation.  Noticing less glucose low's with his pump and sensor use  Feeling well overall, no new health issues reported        Diagnosis of diabetes mellitus age 14, living in [facility]  ... Initial treatment with insulin injections, using Humalog, NPH, and possibly Lantus insulins  2001. Switched to insulin pump management, Medtronic pump  Has used Medtronic CGMS years ago  Previously used the Medtronic 523 pump  8/2018. Upgraded to Medtronic 670G with Guardian3 sensor   Novolog in pump    Fall 2018. Tried Fiasp insulin, didn't notice much difference, stopped it.. Then back to Novolog  Now using the [employer] Contour Link meter, tests 4x/day  Current pump settings:      Recent pump Carelink data:       Previous  labs include:  Lab Results   Component Value Date    A1C 7.1 (H) 10/23/2018     10/23/2018    POTASSIUM 4.4 10/23/2018    CHLORIDE 106 10/23/2018    CO2 27 10/23/2018    ANIONGAP 8 10/23/2018    GLC 45 (LL) 10/23/2018    BUN 27 10/23/2018    CR 1.22 10/23/2018    GFRESTIMATED 63 10/23/2018    GFRESTBLACK 76 10/23/2018    JOCE 9.6 10/23/2018    CHOL 137 07/17/2018    TRIG 60 07/17/2018    HDL 40 07/17/2018    LDL 85 07/17/2018    NHDL 97 07/17/2018    UCRR 136 04/17/2018    MICROL 5 04/17/2018    UMALCR 4.01 04/17/2018 5/7/18. Evaluation with  CDE (DEJON)  Last eye exam 10/2018 at [facility], no active DR noted  DM Complications:  none      , 2-children.    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, [employer] office  Previous gen me evaluations at [facility]      PMH/PSH:  Past Medical History:   Diagnosis Date     Congenital hypertrophic pyloric stenosis     surgically corrected as a child     History of diabetic retinopathy      Insulin pump status      Type I (juvenile type) diabetes mellitus without mention of complication, not stated as uncontrolled      Past Surgical History:   Procedure Laterality  Date     C INCISION OF PYLORIC MUSCLE       C INSERT SUBCUT RESERV/PUMP/DEV       COLONOSCOPY N/A 8/13/2018    Procedure: COLONOSCOPY;  COLONOSCOPY;  Surgeon: Cruz Momin MD;  Location:  GI     FINGER SURGERY         Family Hx:  Family History   Problem Relation Age of Onset     Cardiovascular Father         brain aneurysm- fully recovered     Cancer Mother         brain tumor - left her paralized on 1 side     Cerebrovascular Disease Maternal Grandmother      Heart Disease Paternal Grandfather      Heart Disease Paternal Grandmother          Social Hx:  Social History     Socioeconomic History     Marital status:      Spouse name: Not on file     Number of children: 2     Years of education: Not on file     Highest education level: Not on file   Social Needs     Financial resource strain: Not on file     Food insecurity - worry: Not on file     Food insecurity - inability: Not on file     Transportation needs - medical: Not on file     Transportation needs - non-medical: Not on file   Occupational History     Not on file   Tobacco Use     Smoking status: Never Smoker     Smokeless tobacco: Never Used   Substance and Sexual Activity     Alcohol use: No     Drug use: No     Sexual activity: Yes     Partners: Female   Other Topics Concern     Parent/sibling w/ CABG, MI or angioplasty before 65F 55M? No   Social History Narrative     Not on file          MEDICATIONS:  has a current medication list which includes the following prescription(s): aspirin, blood glucose, cetirizine hcl, contour next test, insulin infusion pump, LIPID LOWERING THERAPY NOT PRESCRIBED, INTENTIONAL,, multi vitamin  mens, and novolog vial.    ROS:     ROS: 10 point ROS neg other than the symptoms noted above in the HPI.    GENERAL: energy good; no weight changes, denies fevers, chills, night sweats.   HEENT: no dysphagia, odonophagia, diplopia, neck pain  THYROID:  no apparent hyper or hypothyroid symptoms  CV: no chest pain,  "pressure, palpitations  LUNGS: no SOB, VARGAS, cough, wheezing   ABDOMEN: occasional heartburn; no diarrhea, constipation, abdominal pain  EXTREMITIES: no rashes, ulcers, edema  NEUROLOGY: no headaches, denies changes in vision, tingling, extremitiy numbness   MSK: no muscle aches or pains, weakness  SKIN: no rashes or lesions  : good erection function, denies nocturia  PSYCH:  stable mood, no significant anxiety or depression  ENDOCRINE: no heat or cold intolerance    Physical Exam   VS: /80   Pulse 70   Ht 1.778 m (5' 10\")   Wt 93 kg (205 lb)   BMI 29.41 kg/m    GENERAL: AXOX3, NAD, well dressed, answering questions appropriately, appears stated age.  THYROID:  normal gland, no apparent nodules or goiter  CV:  RRR without murmurs  LUNGS:  CTA bilaterally without wheezes or crackles  ABDOMEN: soft, nontender, nondistended  NEUROLOGY: CN grossly intact, no tremors  MSK: grossly intact  SKIN: no rashes, no lesions    LABS:    All pertinent notes, labs, and images personally reviewed by me.     A/P:  Encounter Diagnoses   Name Primary?     Type 1 diabetes mellitus without complication (H) Yes     Insulin pump status      Comments:  Reviewed health history and diabetes issues.  Recent glucose trends good but post breakfast hyperglycemia pattern    Plan:  Reviewed the overall T1DM management and insulin pump use.  Discussed optimal BG testing to assess glucose trends.  We reviewed insulin pump settings, basal rate and bolus dosing  Use of automated pump bolus dosing for meal/snack carb & correction dosing  Reviewed the Medtronic Carelink report data today  Reviewed the recent Carelink Guardian3 CGMS glucose trend report, in detail    Recommend:  Continue the current insulin pump, glucose sensor, and diabetes management plan.  Pump setting changes:   Bolus:  I:C MN 17 to 15, 5a 15 to 13   Basals:  No setting changes at this time  Check labs today  Discussed the automatic adjustment capability of the Medtronic " pump with sensor  Need to check fasting lipid panel Summer 2019  Arrange annual dilated eye exam, fasting lipid panel testing.    Keep regular followup appointments with PCP also  Addressed patient's questions today.    Labs ordered today:   Orders Placed This Encounter   Procedures     Hemoglobin A1c     Basic metabolic panel     Radiology/Consults ordered today: None    More than 50% of the time spent with Mr. Jerome on counseling / coordinating his care.  Total appointment time was 30 minutes.    Follow-up:  3 mo    Julien Escalona MD  Endocrinology  [facility]

## 2019-02-12 LAB
ANION GAP SERPL CALCULATED.3IONS-SCNC: 6 MMOL/L (ref 3–14)
BUN SERPL-MCNC: 21 MG/DL (ref 7–30)
CALCIUM SERPL-MCNC: 8.8 MG/DL (ref 8.5–10.1)
CHLORIDE SERPL-SCNC: 105 MMOL/L (ref 94–109)
CO2 SERPL-SCNC: 27 MMOL/L (ref 20–32)
CREAT SERPL-MCNC: 1.13 MG/DL (ref 0.66–1.25)
GFR SERPL CREATININE-BSD FRML MDRD: 75 ML/MIN/{1.73_M2}
GLUCOSE SERPL-MCNC: 209 MG/DL (ref 70–99)
POTASSIUM SERPL-SCNC: 4.3 MMOL/L (ref 3.4–5.3)
SODIUM SERPL-SCNC: 137 MMOL/L (ref 133–144)

## 2019-04-17 DIAGNOSIS — E10.9 TYPE 1 DIABETES MELLITUS WITHOUT COMPLICATION (H): ICD-10-CM

## 2019-04-17 NOTE — TELEPHONE ENCOUNTER
"insulin aspart (NOVOLOG VIAL) 100 UNITS/ML vial     Last Written Prescription Date:  04/17/2018  Last Fill Quantity: 50 mL,  # refills: 3   Last office visit: 2/11/2019 with prescribing provider:  Iqra   Future Office Visit:   Next 5 appointments (look out 90 days)    May 21, 2019  8:00 AM CDT  Return Visit with Julien Escalona MD  [facility] ([address] [address] [phone]           Requested Prescriptions   Pending Prescriptions Disp Refills     insulin aspart (NOVOLOG VIAL) 100 UNITS/ML vial [Pharmacy Med Name: NOVOLOG 100UNIT/ML SOLN] 50 mL 3     Sig: USE WITH INSULIN PUMP, TOTAL DAILY DOSE OF 60 UNITS       Short Acting Insulin Protocol Passed - 4/17/2019  4:07 PM        Passed - Blood pressure less than 140/90 in past 6 months     BP Readings from Last 3 Encounters:   02/11/19 123/80   10/23/18 128/80   08/13/18 108/72                 Passed - LDL on file in past 12 months     Recent Labs   Lab Test 07/17/18  0831   LDL 85             Passed - Microalbumin on file in past 12 months     Recent Labs   Lab Test 04/17/18  0839   MICROL 5   UMALCR 4.01             Passed - Serum creatinine on file in past 12 months     Recent Labs   Lab Test 02/11/19  1534   CR 1.13             Passed - HgbA1C in past 3 or 6 months     If HgbA1C is 8 or greater, it needs to be on file within the past 3 months.  If less than 8, must be on file within the past 6 months.     Recent Labs   Lab Test 02/11/19  1534   A1C 7.3*             Passed - Medication is active on med list        Passed - Patient is age 18 or older        Passed - Recent (6 mo) or future (30 days) visit within the authorizing provider's specialty     Patient had office visit in the last 6 months or has a visit in the next 30 days with authorizing provider or within the authorizing provider's specialty.  See \"Patient Info\" tab in inbasket, or \"Choose Columns\" in Meds & Orders section of " the refill encounter.

## 2019-05-21 ENCOUNTER — OFFICE VISIT (OUTPATIENT)
Dept: ENDOCRINOLOGY | Facility: CLINIC | Age: 49
End: 2019-05-21
Payer: COMMERCIAL

## 2019-05-21 VITALS
BODY MASS INDEX: 28.89 KG/M2 | HEIGHT: 70 IN | SYSTOLIC BLOOD PRESSURE: 130 MMHG | HEART RATE: 65 BPM | WEIGHT: 201.8 LBS | DIASTOLIC BLOOD PRESSURE: 83 MMHG

## 2019-05-21 DIAGNOSIS — E10.9 TYPE 1 DIABETES MELLITUS WITHOUT COMPLICATION (H): ICD-10-CM

## 2019-05-21 DIAGNOSIS — Z96.41 INSULIN PUMP STATUS: ICD-10-CM

## 2019-05-21 LAB
ANION GAP SERPL CALCULATED.3IONS-SCNC: 6 MMOL/L (ref 3–14)
BUN SERPL-MCNC: 21 MG/DL (ref 7–30)
CALCIUM SERPL-MCNC: 9.4 MG/DL (ref 8.5–10.1)
CHLORIDE SERPL-SCNC: 105 MMOL/L (ref 94–109)
CHOLEST SERPL-MCNC: 190 MG/DL
CO2 SERPL-SCNC: 27 MMOL/L (ref 20–32)
CREAT SERPL-MCNC: 1.29 MG/DL (ref 0.66–1.25)
CREAT UR-MCNC: 206 MG/DL
GFR SERPL CREATININE-BSD FRML MDRD: 64 ML/MIN/{1.73_M2}
GLUCOSE SERPL-MCNC: 146 MG/DL (ref 70–99)
HBA1C MFR BLD: 7.5 % (ref 0–5.6)
HDLC SERPL-MCNC: 50 MG/DL
LDLC SERPL CALC-MCNC: 127 MG/DL
MICROALBUMIN UR-MCNC: 7 MG/L
MICROALBUMIN/CREAT UR: 3.61 MG/G CR (ref 0–17)
NONHDLC SERPL-MCNC: 140 MG/DL
POTASSIUM SERPL-SCNC: 4.6 MMOL/L (ref 3.4–5.3)
SODIUM SERPL-SCNC: 138 MMOL/L (ref 133–144)
TRIGL SERPL-MCNC: 65 MG/DL

## 2019-05-21 PROCEDURE — 99207 C FOOT EXAM  NO CHARGE: CPT | Performed by: INTERNAL MEDICINE

## 2019-05-21 PROCEDURE — 99214 OFFICE O/P EST MOD 30 MIN: CPT | Mod: 25 | Performed by: INTERNAL MEDICINE

## 2019-05-21 PROCEDURE — 36415 COLL VENOUS BLD VENIPUNCTURE: CPT | Performed by: INTERNAL MEDICINE

## 2019-05-21 PROCEDURE — 80048 BASIC METABOLIC PNL TOTAL CA: CPT | Performed by: INTERNAL MEDICINE

## 2019-05-21 PROCEDURE — 82043 UR ALBUMIN QUANTITATIVE: CPT | Performed by: INTERNAL MEDICINE

## 2019-05-21 PROCEDURE — 95251 CONT GLUC MNTR ANALYSIS I&R: CPT | Performed by: INTERNAL MEDICINE

## 2019-05-21 PROCEDURE — 83036 HEMOGLOBIN GLYCOSYLATED A1C: CPT | Performed by: INTERNAL MEDICINE

## 2019-05-21 PROCEDURE — 80061 LIPID PANEL: CPT | Performed by: INTERNAL MEDICINE

## 2019-05-21 ASSESSMENT — MIFFLIN-ST. JEOR: SCORE: 1776.61

## 2019-05-21 NOTE — PROGRESS NOTES
Name: Vinicius Jerome      HPI:  Recent issues:  Here for f/u diabetes evaluation  Feeling well overall, no new health issues reported        Diagnosis of diabetes mellitus age 14, living in [facility]  ... Initial treatment with insulin injections, using Humalog, NPH, and possibly Lantus insulins  2001. Switched to insulin pump management, Medtronic pump  Has used Medtronic CGMS years ago  Previously used the Medtronic 523 pump  8/2018. Upgraded to Medtronic 670G with Guardian3 sensor   Novolog in pump    Fall 2018. Tried Fiasp insulin, didn't notice much difference, stopped it.. Then back to Novolog  Now using the [employer] Contour Link meter, tests 4x/day  Exercise:  Walking   Carries raisins during walks, usual basal settings  Current pump settings:      Recent pump Carelink data:          Previous  labs include:  Lab Results   Component Value Date    A1C 7.3 (H) 02/11/2019     02/11/2019    POTASSIUM 4.3 02/11/2019    CHLORIDE 105 02/11/2019    CO2 27 02/11/2019    ANIONGAP 6 02/11/2019     (H) 02/11/2019    BUN 21 02/11/2019    CR 1.13 02/11/2019    GFRESTIMATED 75 02/11/2019    GFRESTBLACK 87 02/11/2019    JOCE 8.8 02/11/2019    CHOL 137 07/17/2018    TRIG 60 07/17/2018    HDL 40 07/17/2018    LDL 85 07/17/2018    NHDL 97 07/17/2018    UCRR 136 04/17/2018    MICROL 5 04/17/2018    UMALCR 4.01 04/17/2018 5/7/18. Evaluation with  CDE (DEJON)  Last eye exam 10/2018 at [facility], no active DR noted  DM Complications:  none      , 2-children.    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, [employer] office  Previous gen me evaluations at [facility]      PMH/PSH:  Past Medical History:   Diagnosis Date     Congenital hypertrophic pyloric stenosis     surgically corrected as a child     History of diabetic retinopathy      Insulin pump status      Type I (juvenile type) diabetes mellitus without mention of complication, not stated as uncontrolled      Past Surgical History:    Procedure Laterality Date     C INCISION OF PYLORIC MUSCLE       C INSERT SUBCUT RESERV/PUMP/DEV       COLONOSCOPY N/A 8/13/2018    Procedure: COLONOSCOPY;  COLONOSCOPY;  Surgeon: Cruz Momin MD;  Location: [facility]     FINGER SURGERY         Family Hx:  Family History   Problem Relation Age of Onset     Cardiovascular Father         brain aneurysm- fully recovered     Cancer Mother         brain tumor - left her paralized on 1 side     Cerebrovascular Disease Maternal Grandmother      Heart Disease Paternal Grandfather      Heart Disease Paternal Grandmother          Social Hx:  Social History     Socioeconomic History     Marital status:      Spouse name: Not on file     Number of children: 2     Years of education: Not on file     Highest education level: Not on file   Occupational History     Not on file   Social Needs     Financial resource strain: Not on file     Food insecurity:     Worry: Not on file     Inability: Not on file     Transportation needs:     Medical: Not on file     Non-medical: Not on file   Tobacco Use     Smoking status: Never Smoker     Smokeless tobacco: Never Used   Substance and Sexual Activity     Alcohol use: No     Drug use: No     Sexual activity: Yes     Partners: Female   Lifestyle     Physical activity:     Days per week: Not on file     Minutes per session: Not on file     Stress: Not on file   Relationships     Social connections:     Talks on phone: Not on file     Gets together: Not on file     Attends Orthodox service: Not on file     Active member of club or organization: Not on file     Attends meetings of clubs or organizations: Not on file     Relationship status: Not on file     Intimate partner violence:     Fear of current or ex partner: Not on file     Emotionally abused: Not on file     Physically abused: Not on file     Forced sexual activity: Not on file   Other Topics Concern     Parent/sibling w/ CABG, MI or angioplasty before 65F 55M? No   Social  "History Narrative     Not on file          MEDICATIONS:  has a current medication list which includes the following prescription(s): aspirin, blood glucose, cetirizine hcl, contour next test, insulin aspart, insulin infusion pump, LIPID LOWERING THERAPY NOT PRESCRIBED, INTENTIONAL,, and multi vitamin  mens.    ROS:     ROS: 10 point ROS neg other than the symptoms noted above in the HPI.    GENERAL: energy good; weight stable, denies fevers, chills, night sweats.   HEENT: no dysphagia, odonophagia, diplopia, neck pain  THYROID:  no apparent hyper or hypothyroid symptoms  CV: no chest pain, pressure, palpitations  LUNGS: no SOB, VARGAS, cough, wheezing   ABDOMEN: occasional heartburn; no diarrhea, constipation, abdominal pain  EXTREMITIES: no rashes, ulcers, edema  NEUROLOGY: no headaches, denies changes in vision, tingling, extremitiy numbness   MSK: no muscle aches or pains, weakness  SKIN: no rashes or lesions  : good erection function, denies nocturia  PSYCH:  stable mood, no significant anxiety or depression  ENDOCRINE: no heat or cold intolerance    Physical Exam   VS: /83   Pulse 65   Ht 1.778 m (5' 10\")   Wt 91.5 kg (201 lb 12.8 oz)   BMI 28.96 kg/m    GENERAL: AXOX3, NAD, well dressed, answering questions appropriately, appears stated age.  THYROID:  normal gland, no apparent nodules or goiter  ABDOMEN: soft, nontender, nondistended  NEUROLOGY: CN grossly intact, no tremors  EXTREM:  Normal appearance of feet, no edema, +pulses, no skin lesions  MSK: grossly intact  SKIN: no rashes, no lesions    LABS:    All pertinent notes, labs, and images personally reviewed by me.     A/P:  Encounter Diagnoses   Name Primary?     Type 1 diabetes mellitus without complication (H)      Insulin pump status      Comments:  Reviewed health history and diabetes issues.  Recent glucose trends good but post breakfast and post lunch hyperglycemia pattern    Plan:  Reviewed the overall T1DM management and insulin pump " use.  Discussed optimal BG testing to assess glucose trends.  We reviewed insulin pump settings, basal rate and bolus dosing  Use of automated pump bolus dosing for meal/snack carb & correction dosing  Reviewed the Medtronic Carelink report data today  Reviewed the recent Carelink Guardian3 CGMS glucose trend report, in detail    Recommend:  Continue the current insulin pump, glucose sensor, and diabetes management plan.  Pump setting changes:   Bolus:  I:C 5a 13 to 11, 11a 14 to 13   Basals:  No setting changes at this time  Check fasting labs today  Discussed the automatic adjustment capability of the Medtronic pump with sensor  Arrange annual dilated eye exam, fasting lipid panel testing.    Keep regular followup appointments with PCP also  Addressed patient's questions today.    Labs ordered today:   Orders Placed This Encounter   Procedures     GLUCOSE MONITOR, 72 HOUR, PHYS INTERP     C FOOT EXAM  NO CHARGE     Hemoglobin A1c     Basic metabolic panel     Lipid panel reflex to direct LDL Fasting     Albumin Random Urine Quantitative with Creat Ratio     Radiology/Consults ordered today: C FOOT EXAM  NO CHARGE    More than 50% of the time spent with Mr. Jerome on counseling / coordinating his care.  Total appointment time was 30 minutes.    Follow-up:  3 mo    Julien Escalona MD  Endocrinology  [facility]

## 2019-08-13 DIAGNOSIS — Z96.41 INSULIN PUMP STATUS: ICD-10-CM

## 2019-08-13 NOTE — TELEPHONE ENCOUNTER
"Requested Prescriptions   Pending Prescriptions Disp Refills     infusion set (PARADIGM SILHOUETTE FULL 43\") [facility] pump supply [Pharmacy Med Name: PARADIGM SILHOUETTE FULL 43\"  MISC] 40 each 2     Sig: CHANGE EVERY 2-3 DAYS OR AS DIRECTED       There is no refill protocol information for this order        Routing refill request to provider for review/approval because:  Infusion Set Pump not on the [facility] refill protocol     Silvia LOPEZ RN    "

## 2019-08-14 RX ORDER — INFUSION SET FOR INSULIN PUMP
INFUSION SETS-PARAPHERNALIA MISCELLANEOUS
Qty: 40 EACH | Refills: 2 | Status: SHIPPED | OUTPATIENT
Start: 2019-08-14 | End: 2020-08-19

## 2019-08-20 ENCOUNTER — OFFICE VISIT (OUTPATIENT)
Dept: ENDOCRINOLOGY | Facility: CLINIC | Age: 49
End: 2019-08-20
Payer: COMMERCIAL

## 2019-08-20 VITALS
DIASTOLIC BLOOD PRESSURE: 81 MMHG | BODY MASS INDEX: 28.19 KG/M2 | SYSTOLIC BLOOD PRESSURE: 126 MMHG | WEIGHT: 196.9 LBS | HEIGHT: 70 IN | HEART RATE: 68 BPM

## 2019-08-20 DIAGNOSIS — E10.3393 TYPE 1 DIABETES MELLITUS WITH MODERATE NONPROLIFERATIVE RETINOPATHY OF BOTH EYES WITHOUT MACULAR EDEMA (H): Primary | ICD-10-CM

## 2019-08-20 DIAGNOSIS — Z96.41 INSULIN PUMP STATUS: ICD-10-CM

## 2019-08-20 LAB
ANION GAP SERPL CALCULATED.3IONS-SCNC: 6 MMOL/L (ref 3–14)
BUN SERPL-MCNC: 17 MG/DL (ref 7–30)
CALCIUM SERPL-MCNC: 8.5 MG/DL (ref 8.5–10.1)
CHLORIDE SERPL-SCNC: 109 MMOL/L (ref 94–109)
CO2 SERPL-SCNC: 26 MMOL/L (ref 20–32)
CREAT SERPL-MCNC: 1.02 MG/DL (ref 0.66–1.25)
GFR SERPL CREATININE-BSD FRML MDRD: 84 ML/MIN/{1.73_M2}
GLUCOSE SERPL-MCNC: 188 MG/DL (ref 70–99)
HBA1C MFR BLD: 7.1 % (ref 0–5.6)
POTASSIUM SERPL-SCNC: 4.3 MMOL/L (ref 3.4–5.3)
SODIUM SERPL-SCNC: 141 MMOL/L (ref 133–144)
TSH SERPL DL<=0.005 MIU/L-ACNC: 2.4 MU/L (ref 0.4–4)

## 2019-08-20 PROCEDURE — 80048 BASIC METABOLIC PNL TOTAL CA: CPT | Performed by: INTERNAL MEDICINE

## 2019-08-20 PROCEDURE — 99213 OFFICE O/P EST LOW 20 MIN: CPT | Performed by: INTERNAL MEDICINE

## 2019-08-20 PROCEDURE — 36415 COLL VENOUS BLD VENIPUNCTURE: CPT | Performed by: INTERNAL MEDICINE

## 2019-08-20 PROCEDURE — 83036 HEMOGLOBIN GLYCOSYLATED A1C: CPT | Performed by: INTERNAL MEDICINE

## 2019-08-20 PROCEDURE — 95251 CONT GLUC MNTR ANALYSIS I&R: CPT | Performed by: INTERNAL MEDICINE

## 2019-08-20 PROCEDURE — 84443 ASSAY THYROID STIM HORMONE: CPT | Performed by: INTERNAL MEDICINE

## 2019-08-20 ASSESSMENT — MIFFLIN-ST. JEOR: SCORE: 1754.38

## 2019-08-20 NOTE — PROGRESS NOTES
Name: Vinicius Jerome      HPI:  Recent issues:  Here for f/u diabetes evaluation  Feeling well overall, no new health issues reported  Recent hiking trip to [place] by himself, went well        Diagnosis of diabetes mellitus age 14, living in [facility]  ... Initial treatment with insulin injections, using Humalog, NPH, and possibly Lantus insulins  2001. Switched to insulin pump management, Medtronic pump  Has used Medtronic CGMS years ago  Previously used the Medtronic 523 pump  8/2018. Upgraded to Medtronic 670G with Guardian3 sensor   Novolog in pump    Fall 2018. Tried Fiasp insulin, didn't notice much difference, stopped it.. Then back to Novolog  Now using the [employer] Contour Link meter, tests 4x/day  Exercise:  Walking   Carries raisins during walks, usual basal settings  Current pump settings:      Recent pump Carelink data:            Previous  labs include:  Lab Results   Component Value Date    A1C 7.5 (H) 05/21/2019     05/21/2019    POTASSIUM 4.6 05/21/2019    CHLORIDE 105 05/21/2019    CO2 27 05/21/2019    ANIONGAP 6 05/21/2019     (H) 05/21/2019    BUN 21 05/21/2019    CR 1.29 (H) 05/21/2019    GFRESTIMATED 64 05/21/2019    GFRESTBLACK 74 05/21/2019    JOCE 9.4 05/21/2019    CHOL 190 05/21/2019    TRIG 65 05/21/2019    HDL 50 05/21/2019     (H) 05/21/2019    NHDL 140 (H) 05/21/2019    UCRR 206 05/21/2019    MICROL 7 05/21/2019    UMALCR 3.61 05/21/2019     Lab Results   Component Value Date    TSH 3.71 04/17/2018    T4 0.98 10/16/2007       5/7/18. Evaluation with  CDE (VB)  Last eye exam 10/2018 at [facility], no active DR noted  DM Complications:  none      , 2-children.    Works for [employer] in [employer], [employer] office  Previous gen me evaluations at [facility]      PMH/PSH:  Past Medical History:   Diagnosis Date     Congenital hypertrophic pyloric stenosis     surgically corrected as a child     Hemorrhoids      History of diabetic  retinopathy      Insulin pump status      Type I (juvenile type) diabetes mellitus without mention of complication, not stated as uncontrolled      Past Surgical History:   Procedure Laterality Date     C INCISION OF PYLORIC MUSCLE       C INSERT SUBCUT RESERV/PUMP/DEV       COLONOSCOPY N/A 8/13/2018    Procedure: COLONOSCOPY;  COLONOSCOPY;  Surgeon: Cruz Momin MD;  Location:  GI     FINGER SURGERY         Family Hx:  Family History   Problem Relation Age of Onset     Cardiovascular Father         brain aneurysm- fully recovered     Cancer Mother         brain tumor - left her paralized on 1 side     Cerebrovascular Disease Maternal Grandmother      Heart Disease Paternal Grandfather      Heart Disease Paternal Grandmother          Social Hx:  Social History     Socioeconomic History     Marital status:      Spouse name: Not on file     Number of children: 2     Years of education: Not on file     Highest education level: Not on file   Occupational History     Not on file   Social Needs     Financial resource strain: Not on file     Food insecurity:     Worry: Not on file     Inability: Not on file     Transportation needs:     Medical: Not on file     Non-medical: Not on file   Tobacco Use     Smoking status: Never Smoker     Smokeless tobacco: Never Used   Substance and Sexual Activity     Alcohol use: No     Drug use: No     Sexual activity: Yes     Partners: Female   Lifestyle     Physical activity:     Days per week: Not on file     Minutes per session: Not on file     Stress: Not on file   Relationships     Social connections:     Talks on phone: Not on file     Gets together: Not on file     Attends Pentecostal service: Not on file     Active member of club or organization: Not on file     Attends meetings of clubs or organizations: Not on file     Relationship status: Not on file     Intimate partner violence:     Fear of current or ex partner: Not on file     Emotionally abused: Not on file      "Physically abused: Not on file     Forced sexual activity: Not on file   Other Topics Concern     Parent/sibling w/ CABG, MI or angioplasty before 65F 55M? No   Social History Narrative     Not on file          MEDICATIONS:  has a current medication list which includes the following prescription(s): aspirin, insulin aspart, multi vitamin  mens, blood glucose, cetirizine hcl, minimed guardian sensor 3, contour next test, infusion set, insulin infusion pump, and LIPID LOWERING THERAPY NOT PRESCRIBED, INTENTIONAL,.    ROS:     ROS: 10 point ROS neg other than the symptoms noted above in the HPI.    GENERAL: energy good; mild weight loss 8#/3 mo; denies fevers, chills, night sweats.   HEENT: no dysphagia, odonophagia, diplopia, neck pain  THYROID:  no apparent hyper or hypothyroid symptoms  CV: no chest pain, pressure, palpitations  LUNGS: no SOB, VARGAS, cough, wheezing   ABDOMEN: occasional heartburn; no diarrhea, constipation, abdominal pain  EXTREMITIES: no rashes, ulcers, edema  NEUROLOGY: no headaches, denies changes in vision, tingling, extremitiy numbness   MSK: no muscle aches or pains, weakness  SKIN: no rashes or lesions  : good erection function, denies nocturia  PSYCH:  stable mood, no significant anxiety or depression  ENDOCRINE: no heat or cold intolerance    Physical Exam   VS: /81   Pulse 68   Ht 1.778 m (5' 10\")   Wt 89.3 kg (196 lb 14.4 oz)   BMI 28.25 kg/m    GENERAL: AXOX3, NAD, well dressed, answering questions appropriately, appears stated age.  THYROID:  normal gland, no apparent nodules or goiter  ABDOMEN: soft, nontender, nondistended  NEUROLOGY: CN grossly intact, no tremors  EXTREM:  No edema  MSK: grossly intact  SKIN: no rashes, no lesions    LABS:    All pertinent notes, labs, and images personally reviewed by me.     A/P:  Encounter Diagnoses   Name Primary?     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  Recent glucose trends good but post breakfast hyperglycemia pattern    Plan:  Reviewed the overall T1DM management and insulin pump use.  Discussed optimal BG testing to assess glucose trends.  We reviewed insulin pump settings, basal rate and bolus dosing  Use of automated pump bolus dosing for meal/snack carb & correction dosing  Reviewed the Medtronic Carelink report data today  Reviewed the recent Carelink Guardian3 CGMS glucose trend report, in detail    Recommend:  Continue the current insulin pump, glucose sensor, and diabetes management plan.  Pump setting changes:   Bolus:  I:C 5a 11 to 10   Basals:  No setting changes at this time  Check non-fasting labs today  We discussed having backup basal insulin pen available for long distance travel  Discussed the automatic adjustment capability of the Medtronic pump with sensor  Arrange annual dilated eye exam, fasting lipid panel testing.    Keep regular followup appointments with PCP also  Addressed patient's questions today.    Labs ordered today:   Orders Placed This Encounter   Procedures     GLUCOSE MONITOR, 72 HOUR, PHYS INTERP     Basic metabolic panel     TSH with free T4 reflex     Hemoglobin A1c     Radiology/Consults ordered today: None    More than 50% of the time spent with Mr. Jerome on counseling / coordinating his care.  Total appointment time was 20 minutes.    Follow-up:  3 mo    VERONICA Escalona MD, MS [facility]

## 2019-10-08 ENCOUNTER — TRANSFERRED RECORDS (OUTPATIENT)
Dept: HEALTH INFORMATION MANAGEMENT | Facility: CLINIC | Age: 49
End: 2019-10-08

## 2019-12-03 ENCOUNTER — OFFICE VISIT (OUTPATIENT)
Dept: ENDOCRINOLOGY | Facility: CLINIC | Age: 49
End: 2019-12-03
Payer: COMMERCIAL

## 2019-12-03 VITALS
WEIGHT: 199.7 LBS | SYSTOLIC BLOOD PRESSURE: 131 MMHG | BODY MASS INDEX: 28.59 KG/M2 | DIASTOLIC BLOOD PRESSURE: 80 MMHG | HEART RATE: 66 BPM | HEIGHT: 70 IN

## 2019-12-03 DIAGNOSIS — Z96.41 INSULIN PUMP STATUS: ICD-10-CM

## 2019-12-03 DIAGNOSIS — E10.9 TYPE 1 DIABETES MELLITUS WITHOUT COMPLICATION (H): Primary | ICD-10-CM

## 2019-12-03 LAB
ALT SERPL W P-5'-P-CCNC: 27 U/L (ref 0–70)
ANION GAP SERPL CALCULATED.3IONS-SCNC: 7 MMOL/L (ref 3–14)
BUN SERPL-MCNC: 22 MG/DL (ref 7–30)
CALCIUM SERPL-MCNC: 9 MG/DL (ref 8.5–10.1)
CHLORIDE SERPL-SCNC: 105 MMOL/L (ref 94–109)
CO2 SERPL-SCNC: 27 MMOL/L (ref 20–32)
CREAT SERPL-MCNC: 1.18 MG/DL (ref 0.66–1.25)
GFR SERPL CREATININE-BSD FRML MDRD: 71 ML/MIN/{1.73_M2}
GLUCOSE SERPL-MCNC: 161 MG/DL (ref 70–99)
HBA1C MFR BLD: 6.9 % (ref 0–5.6)
POTASSIUM SERPL-SCNC: 4.3 MMOL/L (ref 3.4–5.3)
SODIUM SERPL-SCNC: 139 MMOL/L (ref 133–144)

## 2019-12-03 PROCEDURE — 83036 HEMOGLOBIN GLYCOSYLATED A1C: CPT | Performed by: INTERNAL MEDICINE

## 2019-12-03 PROCEDURE — 99214 OFFICE O/P EST MOD 30 MIN: CPT | Performed by: INTERNAL MEDICINE

## 2019-12-03 PROCEDURE — 95251 CONT GLUC MNTR ANALYSIS I&R: CPT | Performed by: INTERNAL MEDICINE

## 2019-12-03 PROCEDURE — 36415 COLL VENOUS BLD VENIPUNCTURE: CPT | Performed by: INTERNAL MEDICINE

## 2019-12-03 PROCEDURE — 84460 ALANINE AMINO (ALT) (SGPT): CPT | Performed by: INTERNAL MEDICINE

## 2019-12-03 PROCEDURE — 99207 C FOOT EXAM  NO CHARGE: CPT | Performed by: INTERNAL MEDICINE

## 2019-12-03 PROCEDURE — 80048 BASIC METABOLIC PNL TOTAL CA: CPT | Performed by: INTERNAL MEDICINE

## 2019-12-03 ASSESSMENT — MIFFLIN-ST. JEOR: SCORE: 1767.08

## 2019-12-03 NOTE — PROGRESS NOTES
Name: Vinicius Jerome      HPI:  Recent issues:  Here for f/u diabetes evaluation  Feeling well overall, no new health issues reported        Diagnosis of diabetes mellitus age 14, living in [facility]  ... Initial treatment with insulin injections, using Humalog, NPH, and possibly Lantus insulins  2001. Switched to insulin pump management, Medtronic pump  Has used Medtronic CGMS years ago  Previously used the Medtronic 523 pump  Fall 2018. Tried Fiasp insulin, didn't notice much difference, stopped it.. Then back to Novolog  8/2018. Upgraded to Medtronic 670G with Guardian3 sensor   Novolog in pump    Now using the [employer] Contour Link meter, tests 4x/day  Exercise:  Walking   Carries raisins during walks, usual basal settings  Current pump settings:      Recent pump Carelink data:              Previous  labs include:  Lab Results   Component Value Date    A1C 7.1 (H) 08/20/2019     08/20/2019    POTASSIUM 4.3 08/20/2019    CHLORIDE 109 08/20/2019    CO2 26 08/20/2019    ANIONGAP 6 08/20/2019     (H) 08/20/2019    BUN 17 08/20/2019    CR 1.02 08/20/2019    GFRESTIMATED 84 08/20/2019    GFRESTBLACK >90 08/20/2019    JOCE 8.5 08/20/2019    CHOL 190 05/21/2019    TRIG 65 05/21/2019    HDL 50 05/21/2019     (H) 05/21/2019    NHDL 140 (H) 05/21/2019    UCRR 206 05/21/2019    MICROL 7 05/21/2019    UMALCR 3.61 05/21/2019     Lab Results   Component Value Date    TSH 2.40 08/20/2019    T4 0.98 10/16/2007       5/7/18. Evaluation with  CDE (VB)  Last eye exam 10/2019 at [facility], no active DR noted  DM Complications:  none      , 2-children.    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      PMH/PSH:  Past Medical History:   Diagnosis Date     Congenital hypertrophic pyloric stenosis     surgically corrected as a child     Hemorrhoids      History of diabetic retinopathy      Insulin pump status      Type I  (juvenile type) diabetes mellitus without mention of complication, not stated as uncontrolled      Past Surgical History:   Procedure Laterality Date     C INCISION OF PYLORIC MUSCLE       C INSERT SUBCUT RESERV/PUMP/DEV       COLONOSCOPY N/A 8/13/2018    Procedure: COLONOSCOPY;  COLONOSCOPY;  Surgeon: Cruz Momin MD;  Location: [facility]     FINGER SURGERY         Family Hx:  Family History   Problem Relation Age of Onset     Cardiovascular Father         brain aneurysm- fully recovered     Cancer Mother         brain tumor - left her paralized on 1 side     Cerebrovascular Disease Maternal Grandmother      Heart Disease Paternal Grandfather      Heart Disease Paternal Grandmother          Social Hx:  Social History     Socioeconomic History     Marital status:      Spouse name: Not on file     Number of children: 2     Years of education: Not on file     Highest education level: Not on file   Occupational History     Not on file   Social Needs     Financial resource strain: Not on file     Food insecurity:     Worry: Not on file     Inability: Not on file     Transportation needs:     Medical: Not on file     Non-medical: Not on file   Tobacco Use     Smoking status: Never Smoker     Smokeless tobacco: Never Used   Substance and Sexual Activity     Alcohol use: No     Drug use: No     Sexual activity: Yes     Partners: Female   Lifestyle     Physical activity:     Days per week: Not on file     Minutes per session: Not on file     Stress: Not on file   Relationships     Social connections:     Talks on phone: Not on file     Gets together: Not on file     Attends Restorationism service: Not on file     Active member of club or organization: Not on file     Attends meetings of clubs or organizations: Not on file     Relationship status: Not on file     Intimate partner violence:     Fear of current or ex partner: Not on file     Emotionally abused: Not on file     Physically abused: Not on file     Forced sexual  "activity: Not on file   Other Topics Concern     Parent/sibling w/ CABG, MI or angioplasty before 65F 55M? No   Social History Narrative     Not on file          MEDICATIONS:  has a current medication list which includes the following prescription(s): aspirin, cetirizine hcl, insulin aspart, multi vitamin  mens, blood glucose, minimed guardian sensor 3, contour next test, infusion set, insulin infusion pump, and LIPID LOWERING THERAPY NOT PRESCRIBED, INTENTIONAL,.    ROS:     ROS: 10 point ROS neg other than the symptoms noted above in the HPI.    GENERAL: energy good; weight stable; denies fevers, chills, night sweats.   HEENT: no dysphagia, odonophagia, diplopia, neck pain  THYROID:  no apparent hyper or hypothyroid symptoms  CV: no chest pain, pressure, palpitations  LUNGS: no SOB, VARGAS, cough, wheezing   ABDOMEN: occasional heartburn; no diarrhea, constipation, abdominal pain  EXTREMITIES: no rashes, ulcers, edema  NEUROLOGY: no headaches, denies changes in vision, tingling, extremitiy numbness   MSK: no muscle aches or pains, weakness  SKIN: no rashes or lesions  : good erection function, denies nocturia  PSYCH:  stable mood, no significant anxiety or depression  ENDOCRINE: no heat or cold intolerance    Physical Exam   VS: /80   Pulse 66   Ht 1.778 m (5' 10\")   Wt 90.6 kg (199 lb 11.2 oz)   BMI 28.65 kg/m    GENERAL: AXOX3, NAD, well dressed, answering questions appropriately, appears stated age.  THYROID:  normal gland, no apparent nodules or goiter  ABDOMEN: soft, nontender, nondistended  NEUROLOGY: CN grossly intact, no tremors  EXTREM:  No edema, great toenail irreg contour and some thickening L>R, +pulses, no feet skin lesions  MSK: grossly intact  SKIN: no rashes, no lesions    LABS:    All pertinent notes, labs, and images personally reviewed by me.     A/P:  Encounter Diagnoses   Name Primary?     Type 1 diabetes mellitus without complication (H) Yes     Insulin pump status  "     Comments:  Reviewed health history and diabetes issues.  Recent glucose trends good but post breakfast and supper hyperglycemia pattern    Plan:  Reviewed the overall T1DM management and insulin pump use.  Discussed optimal BG testing to assess glucose trends.  We reviewed insulin pump settings, basal rate and bolus dosing  Use of automated pump bolus dosing for meal/snack carb & correction dosing  Reviewed the Medtronic Carelink report data today  Reviewed the recent Carelink Guardian3 CGMS glucose trend report, in detail    Recommend:  Continue the current insulin pump, glucose sensor, and diabetes management plan.  Pump setting changes:   Bolus:  I:C 5a 11 back to 10, 5p 13 to 12   Basals:  No setting changes at this time  Check non-fasting labs today  We discussed having backup basal insulin pen available for long distance travel  Discussed the automatic adjustment capability of the Medtronic pump with sensor  Due for fasting lipid panel... plan for 3/2020 clinic appt here  Arrange annual dilated eye exam, fasting lipid panel testing.    Keep regular followup appointments with PCP also  Addressed patient's questions today.    Labs ordered today:   Orders Placed This Encounter   Procedures     GLUCOSE MONITOR, 72 HOUR, PHYS INTERP     C FOOT EXAM  NO CHARGE     Hemoglobin A1c     Basic metabolic panel     ALT     Radiology/Consults ordered today: C FOOT EXAM  NO CHARGE    More than 50% of the time spent with Mr. Jerome on counseling / coordinating his care.  Total appointment time was 25 minutes.    Follow-up:  3-4 mo    VERONICA Escalona MD, MS  Endocrinology  [facility]

## 2020-02-10 ENCOUNTER — HEALTH MAINTENANCE LETTER (OUTPATIENT)
Age: 50
End: 2020-02-10

## 2020-03-05 ENCOUNTER — OFFICE VISIT (OUTPATIENT)
Dept: ENDOCRINOLOGY | Facility: CLINIC | Age: 50
End: 2020-03-05
Payer: COMMERCIAL

## 2020-03-05 VITALS
HEIGHT: 70 IN | DIASTOLIC BLOOD PRESSURE: 82 MMHG | BODY MASS INDEX: 28.63 KG/M2 | SYSTOLIC BLOOD PRESSURE: 133 MMHG | HEART RATE: 66 BPM | WEIGHT: 200 LBS

## 2020-03-05 DIAGNOSIS — E10.9 TYPE 1 DIABETES MELLITUS WITHOUT COMPLICATION (H): Primary | ICD-10-CM

## 2020-03-05 DIAGNOSIS — Z96.41 INSULIN PUMP STATUS: ICD-10-CM

## 2020-03-05 LAB
ALT SERPL W P-5'-P-CCNC: 28 U/L (ref 0–70)
CHOLEST SERPL-MCNC: 165 MG/DL
HBA1C MFR BLD: 7.5 % (ref 0–5.6)
HDLC SERPL-MCNC: 49 MG/DL
LDLC SERPL CALC-MCNC: 104 MG/DL
NONHDLC SERPL-MCNC: 116 MG/DL
TRIGL SERPL-MCNC: 62 MG/DL
TSH SERPL DL<=0.005 MIU/L-ACNC: 3.18 MU/L (ref 0.4–4)

## 2020-03-05 PROCEDURE — 83036 HEMOGLOBIN GLYCOSYLATED A1C: CPT | Performed by: INTERNAL MEDICINE

## 2020-03-05 PROCEDURE — 84460 ALANINE AMINO (ALT) (SGPT): CPT | Performed by: INTERNAL MEDICINE

## 2020-03-05 PROCEDURE — 80061 LIPID PANEL: CPT | Performed by: INTERNAL MEDICINE

## 2020-03-05 PROCEDURE — 99214 OFFICE O/P EST MOD 30 MIN: CPT | Performed by: INTERNAL MEDICINE

## 2020-03-05 PROCEDURE — 84443 ASSAY THYROID STIM HORMONE: CPT | Performed by: INTERNAL MEDICINE

## 2020-03-05 PROCEDURE — 95251 CONT GLUC MNTR ANALYSIS I&R: CPT | Performed by: INTERNAL MEDICINE

## 2020-03-05 PROCEDURE — 36415 COLL VENOUS BLD VENIPUNCTURE: CPT | Performed by: INTERNAL MEDICINE

## 2020-03-05 ASSESSMENT — MIFFLIN-ST. JEOR: SCORE: 1768.44

## 2020-03-05 NOTE — LETTER
3/5/2020         RE: Vinicius Jerome  [address] [facility] [id number]        Dear Colleague,    Thank you for referring your patient, Vinicius Jerome, to the [facility]. Please see a copy of my visit note below.    Name: Vinicius Jerome      HPI:  Recent issues:  Here for f/u diabetes evaluation  Feeling well overall, no new health issues reported        Diagnosis of diabetes mellitus age 14, living in [facility]  ... Initial treatment with insulin injections, using Humalog, NPH, and possibly Lantus insulins  2001. Switched to insulin pump management, Medtronic pump  Has used Medtronic CGMS years ago  Previously used the Medtronic 523 pump  Fall 2018. Tried Fiasp insulin, didn't notice much difference, stopped it.. Then back to Novolog  8/2018. Upgraded to Medtronic 670G with Guardian3 sensor   Novolog in pump    Now using the Flashpoint Contour Link meter, tests 4x/day  Exercise:  Walking   Carries raisins during walks, usual basal settings  Current pump settings:      Recent pump Carelink data:              Previous  labs include:  Lab Results   Component Value Date    A1C 7.5 (H) 03/05/2020     12/03/2019    POTASSIUM 4.3 12/03/2019    CHLORIDE 105 12/03/2019    CO2 27 12/03/2019    ANIONGAP 7 12/03/2019     (H) 12/03/2019    BUN 22 12/03/2019    CR 1.18 12/03/2019    GFRESTIMATED 71 12/03/2019    GFRESTBLACK 82 12/03/2019    JOCE 9.0 12/03/2019    CHOL 165 03/05/2020    TRIG 62 03/05/2020    HDL 49 03/05/2020     (H) 03/05/2020    NHDL 116 03/05/2020    UCRR 206 05/21/2019    MICROL 7 05/21/2019    UMALCR 3.61 05/21/2019     Lab Results   Component Value Date    TSH 3.18 03/05/2020    T4 0.98 10/16/2007       5/7/18. Evaluation with  CDE (VB)  Last eye exam 10/2019 at [facility], no active DR noted  DM Complications:  none      , 2-children.    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  Previous gen me evaluations at [facility]      PMH/PSH:  Past Medical History:   Diagnosis Date     Congenital hypertrophic pyloric stenosis     surgically corrected as a child     Hemorrhoids      History of diabetic retinopathy      Insulin pump status      Type I (juvenile type) diabetes mellitus without mention of complication, not stated as uncontrolled      Past Surgical History:   Procedure Laterality Date     C INCISION OF PYLORIC MUSCLE       C INSERT SUBCUT RESERV/PUMP/DEV       COLONOSCOPY N/A 8/13/2018    Procedure: COLONOSCOPY;  COLONOSCOPY;  Surgeon: Cruz Momin MD;  Location: [facility]     FINGER SURGERY         Family Hx:  Family History   Problem Relation Age of Onset     Cardiovascular Father         brain aneurysm- fully recovered     Cancer Mother         brain tumor - left her paralized on 1 side     Cerebrovascular Disease Maternal Grandmother      Heart Disease Paternal Grandfather      Heart Disease Paternal Grandmother          Social Hx:  Social History     Socioeconomic History     Marital status:      Spouse name: Not on file     Number of children: 2     Years of education: Not on file     Highest education level: Not on file   Occupational History     Not on file   Social Needs     Financial resource strain: Not on file     Food insecurity:     Worry: Not on file     Inability: Not on file     Transportation needs:     Medical: Not on file     Non-medical: Not on file   Tobacco Use     Smoking status: Never Smoker     Smokeless tobacco: Never Used   Substance and Sexual Activity     Alcohol use: No     Drug use: No     Sexual activity: Yes     Partners: Female   Lifestyle     Physical activity:     Days per week: Not on file     Minutes per session: Not on file     Stress: Not on file   Relationships     Social connections:     Talks on phone: Not on file     Gets together: Not on file     Attends Holiness service: Not on file     Active member of club or organization: Not on file     Attends meetings of clubs or  "organizations: Not on file     Relationship status: Not on file     Intimate partner violence:     Fear of current or ex partner: Not on file     Emotionally abused: Not on file     Physically abused: Not on file     Forced sexual activity: Not on file   Other Topics Concern     Parent/sibling w/ CABG, MI or angioplasty before 65F 55M? No   Social History Narrative     Not on file          MEDICATIONS:  has a current medication list which includes the following prescription(s): aspirin, cetirizine hcl, infusion set, insulin aspart, multi vitamin  mens, blood glucose, minimed guardian sensor 3, contour next test, insulin infusion pump, and LIPID LOWERING THERAPY NOT PRESCRIBED, INTENTIONAL,.    ROS:     ROS: 10 point ROS neg other than the symptoms noted above in the HPI.    GENERAL: energy good; weight stable; denies fevers, chills, night sweats.   HEENT: no dysphagia, odonophagia, diplopia, neck pain  THYROID:  no apparent hyper or hypothyroid symptoms  CV: no chest pain, pressure, palpitations  LUNGS: no SOB, VARGAS, cough, wheezing   ABDOMEN: occasional heartburn; no diarrhea, constipation, abdominal pain  EXTREMITIES: no rashes, ulcers, edema  NEUROLOGY: no headaches, denies changes in vision, tingling, extremitiy numbness   MSK: no muscle aches or pains, weakness  SKIN: no rashes or lesions  : good erection function, denies nocturia  PSYCH:  stable mood, no significant anxiety or depression  ENDOCRINE: no heat or cold intolerance    Physical Exam   VS: /82   Pulse 66   Ht 1.778 m (5' 10\")   Wt 90.7 kg (200 lb)   BMI 28.70 kg/m     GENERAL: AXOX3, NAD, well dressed, answering questions appropriately, appears stated age.  THYROID:  normal gland, no apparent nodules or goiter  ABDOMEN: soft, nontender, nondistended  NEUROLOGY: CN grossly intact, no tremors  EXTREM:  No edema, great toenail irreg contour and some thickening L>R, +pulses, no feet skin lesions  MSK: grossly intact  SKIN: no rashes, no " lesions    LABS:    All pertinent notes, labs, and images personally reviewed by me.     A/P:  Encounter Diagnoses   Name Primary?     Type 1 diabetes mellitus without complication (H) Yes     Insulin pump status      Comments:  Reviewed health history and diabetes issues.  Recent glucose trends good but post breakfast and supper hyperglycemia pattern    Plan:  Reviewed the overall T1DM management and insulin pump use.  Discussed optimal BG testing to assess glucose trends.  We reviewed insulin pump settings, basal rate and bolus dosing  Use of automated pump bolus dosing for meal/snack carb & correction dosing  Reviewed the [employer] Carelink report data today  Reviewed the recent Carelink Guardian3 CGMS glucose trend report, in detail    Recommend:  Continue the current insulin pump, glucose sensor, and diabetes management plan.  Pump setting changes:   Bolus I:C:  5a 10 to 9.5, 11a 13 to 12    Check fasting labs today  Discussed topic of urine ketone testing with sick-day management, needs updated urine ketostix Rx soon  We discussed having backup basal insulin pen available for long distance travel  Discussed the automatic adjustment capability of the Medtronic pump with sensor  Arrange annual dilated eye exam, fasting lipid panel testing.    Keep regular followup appointments with PCP also  Addressed patient's questions today.    Labs ordered today:   Orders Placed This Encounter   Procedures     GLUCOSE MONITOR, 72 HOUR, PHYS INTERP     Hemoglobin A1c     Lipid panel reflex to direct LDL Fasting     ALT     TSH     Radiology/Consults ordered today: None    More than 50% of the time spent with Mr. Jerome on counseling / coordinating his care.  Total appointment time was 25 minutes.    Follow-up:  3-4 mo    VERONICA Escalona MD, MS  Endocrinology  [facility]                    Again, thank you for allowing me to participate in the care of your patient.        Sincerely,        Julien Escalona MD

## 2020-03-05 NOTE — PROGRESS NOTES
Name: Vinicius Jerome      HPI:  Recent issues:  Here for f/u diabetes evaluation  Feeling well overall, no new health issues reported        Diagnosis of diabetes mellitus age 14, living in [facility]  ... Initial treatment with insulin injections, using Humalog, NPH, and possibly Lantus insulins  2001. Switched to insulin pump management, Medtronic pump  Has used Medtronic CGMS years ago  Previously used the Medtronic 523 pump  Fall 2018. Tried Fiasp insulin, didn't notice much difference, stopped it.. Then back to Novolog  8/2018. Upgraded to Medtronic 670G with Guardian3 sensor   Novolog in pump    Now using the Seat 14A Contour Link meter, tests 4x/day  Exercise:  Walking   Carries raisins during walks, usual basal settings  Current pump settings:      Recent pump Carelink data:              Previous  labs include:  Lab Results   Component Value Date    A1C 7.5 (H) 03/05/2020     12/03/2019    POTASSIUM 4.3 12/03/2019    CHLORIDE 105 12/03/2019    CO2 27 12/03/2019    ANIONGAP 7 12/03/2019     (H) 12/03/2019    BUN 22 12/03/2019    CR 1.18 12/03/2019    GFRESTIMATED 71 12/03/2019    GFRESTBLACK 82 12/03/2019    JOCE 9.0 12/03/2019    CHOL 165 03/05/2020    TRIG 62 03/05/2020    HDL 49 03/05/2020     (H) 03/05/2020    NHDL 116 03/05/2020    UCRR 206 05/21/2019    MICROL 7 05/21/2019    UMALCR 3.61 05/21/2019     Lab Results   Component Value Date    TSH 3.18 03/05/2020    T4 0.98 10/16/2007       5/7/18. Evaluation with  CDE (VB)  Last eye exam 10/2019 at [facility], no active DR noted  DM Complications:  none      , 2-children.    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, [employer] office  Previous gen me evaluations at [facility]      PMH/PSH:  Past Medical History:   Diagnosis Date     Congenital hypertrophic pyloric stenosis     surgically corrected as a child     Hemorrhoids      History of diabetic retinopathy      Insulin pump status      Type I (juvenile  type) diabetes mellitus without mention of complication, not stated as uncontrolled      Past Surgical History:   Procedure Laterality Date     C INCISION OF PYLORIC MUSCLE       C INSERT SUBCUT RESERV/PUMP/DEV       COLONOSCOPY N/A 8/13/2018    Procedure: COLONOSCOPY;  COLONOSCOPY;  Surgeon: Cruz Momin MD;  Location: [facility]     FINGER SURGERY         Family Hx:  Family History   Problem Relation Age of Onset     Cardiovascular Father         brain aneurysm- fully recovered     Cancer Mother         brain tumor - left her paralized on 1 side     Cerebrovascular Disease Maternal Grandmother      Heart Disease Paternal Grandfather      Heart Disease Paternal Grandmother          Social Hx:  Social History     Socioeconomic History     Marital status:      Spouse name: Not on file     Number of children: 2     Years of education: Not on file     Highest education level: Not on file   Occupational History     Not on file   Social Needs     Financial resource strain: Not on file     Food insecurity:     Worry: Not on file     Inability: Not on file     Transportation needs:     Medical: Not on file     Non-medical: Not on file   Tobacco Use     Smoking status: Never Smoker     Smokeless tobacco: Never Used   Substance and Sexual Activity     Alcohol use: No     Drug use: No     Sexual activity: Yes     Partners: Female   Lifestyle     Physical activity:     Days per week: Not on file     Minutes per session: Not on file     Stress: Not on file   Relationships     Social connections:     Talks on phone: Not on file     Gets together: Not on file     Attends Pentecostalism service: Not on file     Active member of club or organization: Not on file     Attends meetings of clubs or organizations: Not on file     Relationship status: Not on file     Intimate partner violence:     Fear of current or ex partner: Not on file     Emotionally abused: Not on file     Physically abused: Not on file     Forced sexual activity:  "Not on file   Other Topics Concern     Parent/sibling w/ CABG, MI or angioplasty before 65F 55M? No   Social History Narrative     Not on file          MEDICATIONS:  has a current medication list which includes the following prescription(s): aspirin, cetirizine hcl, infusion set, insulin aspart, multi vitamin  mens, blood glucose, minimed guardian sensor 3, contour next test, insulin infusion pump, and LIPID LOWERING THERAPY NOT PRESCRIBED, INTENTIONAL,.    ROS:     ROS: 10 point ROS neg other than the symptoms noted above in the HPI.    GENERAL: energy good; weight stable; denies fevers, chills, night sweats.   HEENT: no dysphagia, odonophagia, diplopia, neck pain  THYROID:  no apparent hyper or hypothyroid symptoms  CV: no chest pain, pressure, palpitations  LUNGS: no SOB, VARGAS, cough, wheezing   ABDOMEN: occasional heartburn; no diarrhea, constipation, abdominal pain  EXTREMITIES: no rashes, ulcers, edema  NEUROLOGY: no headaches, denies changes in vision, tingling, extremitiy numbness   MSK: no muscle aches or pains, weakness  SKIN: no rashes or lesions  : good erection function, denies nocturia  PSYCH:  stable mood, no significant anxiety or depression  ENDOCRINE: no heat or cold intolerance    Physical Exam   VS: /82   Pulse 66   Ht 1.778 m (5' 10\")   Wt 90.7 kg (200 lb)   BMI 28.70 kg/m    GENERAL: AXOX3, NAD, well dressed, answering questions appropriately, appears stated age.  THYROID:  normal gland, no apparent nodules or goiter  ABDOMEN: soft, nontender, nondistended  NEUROLOGY: CN grossly intact, no tremors  EXTREM:  No edema, great toenail irreg contour and some thickening L>R, +pulses, no feet skin lesions  MSK: grossly intact  SKIN: no rashes, no lesions    LABS:    All pertinent notes, labs, and images personally reviewed by me.     A/P:  Encounter Diagnoses   Name Primary?     Type 1 diabetes mellitus without complication (H) Yes     Insulin pump status      Comments:  Reviewed health " history and diabetes issues.  Recent glucose trends good but post breakfast and supper hyperglycemia pattern    Plan:  Reviewed the overall T1DM management and insulin pump use.  Discussed optimal BG testing to assess glucose trends.  We reviewed insulin pump settings, basal rate and bolus dosing  Use of automated pump bolus dosing for meal/snack carb & correction dosing  Reviewed the Medtronic Carelink report data today  Reviewed the recent Carelink Guardian3 CGMS glucose trend report, in detail    Recommend:  Continue the current insulin pump, glucose sensor, and diabetes management plan.  Pump setting changes:   Bolus I:C:  5a 10 to 9.5, 11a 13 to 12    Check fasting labs today  Discussed topic of urine ketone testing with sick-day management, needs updated urine ketostix Rx soon  We discussed having backup basal insulin pen available for long distance travel  Discussed the automatic adjustment capability of the Medtronic pump with sensor  Arrange annual dilated eye exam, fasting lipid panel testing.    Keep regular followup appointments with PCP also  Addressed patient's questions today.    Labs ordered today:   Orders Placed This Encounter   Procedures     GLUCOSE MONITOR, 72 HOUR, PHYS INTERP     Hemoglobin A1c     Lipid panel reflex to direct LDL Fasting     ALT     TSH     Radiology/Consults ordered today: None    More than 50% of the time spent with Mr. Jerome on counseling / coordinating his care.  Total appointment time was 25 minutes.    Follow-up:  3-4 mo    VERONICA Escalona MD, MS  Endocrinology  [facility]

## 2020-06-16 ENCOUNTER — TELEPHONE (OUTPATIENT)
Dept: ENDOCRINOLOGY | Facility: CLINIC | Age: 50
End: 2020-06-16
Payer: COMMERCIAL

## 2020-06-16 DIAGNOSIS — E10.9 TYPE 1 DIABETES MELLITUS WITHOUT COMPLICATION (H): ICD-10-CM

## 2020-06-16 DIAGNOSIS — Z96.41 INSULIN PUMP STATUS: ICD-10-CM

## 2020-06-16 NOTE — TELEPHONE ENCOUNTER
Spoke with pt - sending instruction via [employer] for uploading pump at home.    Jacque Driver MA

## 2020-06-16 NOTE — TELEPHONE ENCOUNTER
Refill request:    CONTOUR NEXT TEST STRIP    Summary: Use to test blood sugar 4 times daily or as directed, Leana Contour Next strips, E10.9, Disp-400 strip, R-3, KARINA, E-Prescribe   Start: 5/21/2019  Ord/Sold: 5/21/2019

## 2020-06-16 NOTE — TELEPHONE ENCOUNTER
Reason for Call:  Other appointment    Detailed comments: Vinicius is wondering about preparing for his appointment next Monday 6/22/2020, how do the number readings happen with his pump, what does he need to do to prepare for that part of discussion    Phone Number Patient can be reached at: Home number on file [phone] (home)    Best Time: any    Can we leave a detailed message on this number? YES    Call taken on 6/16/2020 at 1:17 PM by Marina Arguelles

## 2020-06-17 NOTE — TELEPHONE ENCOUNTER
Prescription approved per [facility] Refill Protocol.  Bridget Alexander RN on 6/17/2020 at 1:56 PM

## 2020-06-22 ENCOUNTER — VIRTUAL VISIT (OUTPATIENT)
Dept: ENDOCRINOLOGY | Facility: CLINIC | Age: 50
End: 2020-06-22
Payer: COMMERCIAL

## 2020-06-22 VITALS — BODY MASS INDEX: 28.35 KG/M2 | WEIGHT: 198 LBS | HEIGHT: 70 IN

## 2020-06-22 DIAGNOSIS — Z96.41 INSULIN PUMP STATUS: ICD-10-CM

## 2020-06-22 DIAGNOSIS — E10.3393 TYPE 1 DIABETES MELLITUS WITH MODERATE NONPROLIFERATIVE RETINOPATHY OF BOTH EYES WITHOUT MACULAR EDEMA (H): Primary | ICD-10-CM

## 2020-06-22 PROCEDURE — 95251 CONT GLUC MNTR ANALYSIS I&R: CPT | Performed by: INTERNAL MEDICINE

## 2020-06-22 PROCEDURE — 99213 OFFICE O/P EST LOW 20 MIN: CPT | Mod: GT | Performed by: INTERNAL MEDICINE

## 2020-06-22 ASSESSMENT — MIFFLIN-ST. JEOR: SCORE: 1754.37

## 2020-06-22 NOTE — LETTER
"    6/22/2020         RE: Vinicius Jerome  [address] [address] [facility] [address]        Dear Colleague,    Thank you for referring your patient, Vinicius Jerome, to the [facility]. Please see a copy of my visit note below.    Vinicius Jerome is a 50 year old male who is being evaluated via a billable video visit.      The patient has been notified of following:     \"This video visit will be conducted via a call between you and your physician/provider. We have found that certain health care needs can be provided without the need for an in-person physical exam.  This service lets us provide the care you need with a video conversation.  If a prescription is necessary we can send it directly to your pharmacy.  If lab work is needed we can place an order for that and you can then stop by our lab to have the test done at a later time.    Video visits are billed at different rates depending on your insurance coverage.  Please reach out to your insurance provider with any questions.    If during the course of the call the physician/provider feels a video visit is not appropriate, you will not be charged for this service.\"    Patient has given verbal consent for Video visit? Yes    Will anyone else be joining your video visit? No      Recent issues:  Diabetes followup evaluation  Feeling well overall, no new health issues reported        Diagnosis of diabetes mellitus age 14, living in [facility]  ... Initial treatment with insulin injections, using Humalog, NPH, and possibly Lantus insulins  2001. Switched to insulin pump management, Medtronic pump  Has used Medtronic CGMS years ago  Previously used the Medtronic 523 pump  Fall 2018. Tried Fiasp insulin, didn't notice much difference, stopped it.. Then back to Novolog  8/2018. Upgraded to Medtronic 670G with Guardian3 sensor              Novolog in pump     Now using the Strap Contour Link meter, tests 4x/day  Exercise:  Walking              Carries raisins " during walks, usual basal settings  Current pump settings:       Recent pump Carelink data:         Previous  labs include:  Lab Results   Component Value Date    A1C 7.5 (H) 03/05/2020     12/03/2019    POTASSIUM 4.3 12/03/2019    CHLORIDE 105 12/03/2019    CO2 27 12/03/2019    ANIONGAP 7 12/03/2019     (H) 12/03/2019    BUN 22 12/03/2019    CR 1.18 12/03/2019    GFRESTIMATED 71 12/03/2019    GFRESTBLACK 82 12/03/2019    JOCE 9.0 12/03/2019    CHOL 165 03/05/2020    TRIG 62 03/05/2020    HDL 49 03/05/2020     (H) 03/05/2020    NHDL 116 03/05/2020    UCRR 206 05/21/2019    MICROL 7 05/21/2019    UMALCR 3.61 05/21/2019 5/7/18. Evaluation with  CDE (VB)  Last eye exam 10/2019 at [facility], no active DR noted  DM Complications:  none        , 2-children.    Works for [employer] in [employer], [employer] office  Previous gen me evaluations at [facility]         ROS: 10 point ROS neg other than the symptoms noted above in the HPI.     GENERAL: energy good; weight stable; denies fevers, chills, night sweats.   HEENT: no dysphagia, odonophagia, diplopia, neck pain  THYROID:  no apparent hyper or hypothyroid symptoms  CV: no chest pain, pressure, palpitations  LUNGS: no SOB, VARGAS, cough, wheezing   ABDOMEN: right mid abdomen pain with running; no diarrhea, constipation, abdominal pain  EXTREMITIES: no rashes, ulcers, edema  NEUROLOGY: no headaches, denies changes in vision, tingling, extremitiy numbness   MSK: no muscle aches or pains, weakness  SKIN: no rashes or lesions  : good erection function, denies nocturia  PSYCH:  stable mood, no significant anxiety or depression  ENDOCRINE: no heat or cold intolerance     Physical Exam (visual exam)  VS:  no vital signs taken for video visit  GENERAL: healthy, alert and NAD, well dressed, answering questions appropriately  EYES: eyes grossly normal to inspection, conjunctivae and sclerae normal, no exophthalmos or  proptosis  THYROID:  no apparent nodules or goiter  LUNGS: no audible wheeze, cough or visible cyanosis, no visible retractions or increased work of breathing  ABDOMEN: abdomen not evaluated  EXTREMITIES: no hand tremors, limited exam  NEUROLOGY: CN grossly intact, mentation intact and speech normal   PSYCH: mentation appears normal, affect normal/bright, judgement and insight intact, normal speech and appearance well groomed       LABS:     All pertinent notes, labs, and images personally reviewed by me.      A/P:       Encounter Diagnoses   Name      Type 1 diabetes mellitus without complication (H)      Insulin pump status      Comments:  Reviewed health history and diabetes issues.  Recent glucose trends good but post breakfast and supper hyperglycemia pattern     Plan:  Reviewed the overall T1DM management and insulin pump use.  Discussed optimal BG testing to assess glucose trends.  We reviewed insulin pump settings, basal rate and bolus dosing  Use of automated pump bolus dosing for meal/snack carb & correction dosing  Reviewed the [employer] Carelink report data today  Reviewed the recent Carelink Guardian3 CGMS glucose trend report, in detail     Recommend:  Continue the current insulin pump, glucose sensor, and diabetes management plan.  Pump setting changes:   Basals:  Add 3-6p 0.25 unit(s)/hr   Bolus ICR: 5a 9.5 to 8.5, 5p 12 to 11     No labs ordered today but plan preappt fasting labs in 9/2020, orders placed  We have discussed having backup basal insulin pen available for long distance travel  Discussed the automatic adjustment capability of the Medtronic pump with sensor  Arrange annual dilated eye exam, fasting lipid panel testing.     Keep regular followup appointments with PCP also  Addressed patient's questions today.        More than 50% of the time spent with Mr. Jerome on counseling / coordinating his care.  Total appointment time was 20 minutes.     Follow-up:  3-4 mo     VERONICA Escalona MD,  MS  Endocrinology  [facility]      Video-Visit Details    Type of service:  Video Visit    Video Start Time: 8:10 am  Video End Time: 8:26 am    Originating Location (pt. Location): home    Distant Location (provider location):  [facility]     Platform used for Video Visit: Doxbradly          Again, thank you for allowing me to participate in the care of your patient.        Sincerely,        Julien Escalona MD

## 2020-06-22 NOTE — PROGRESS NOTES
"Vinicius Jerome is a 50 year old male who is being evaluated via a billable video visit.      The patient has been notified of following:     \"This video visit will be conducted via a call between you and your physician/provider. We have found that certain health care needs can be provided without the need for an in-person physical exam.  This service lets us provide the care you need with a video conversation.  If a prescription is necessary we can send it directly to your pharmacy.  If lab work is needed we can place an order for that and you can then stop by our lab to have the test done at a later time.    Video visits are billed at different rates depending on your insurance coverage.  Please reach out to your insurance provider with any questions.    If during the course of the call the physician/provider feels a video visit is not appropriate, you will not be charged for this service.\"    Patient has given verbal consent for Video visit? Yes    Will anyone else be joining your video visit? No      Recent issues:  Diabetes followup evaluation  Feeling well overall, no new health issues reported        Diagnosis of diabetes mellitus age 14, living in [facility]  ... Initial treatment with insulin injections, using Humalog, NPH, and possibly Lantus insulins  2001. Switched to insulin pump management, Medtronic pump  Has used Medtronic CGMS years ago  Previously used the Medtronic 523 pump  Fall 2018. Tried Fiasp insulin, didn't notice much difference, stopped it.. Then back to Novolog  8/2018. Upgraded to Medtronic 670G with Guardian3 sensor              Novolog in pump     Now using the [email or web address] Contour Link meter, tests 4x/day  Exercise:  Walking              Carries raisins during walks, usual basal settings  Current pump settings:       Recent pump Carelink data:         Previous [facility] labs include:  Lab Results   Component Value Date    A1C 7.5 (H) 03/05/2020     12/03/2019    POTASSIUM 4.3 12/03/2019    " CHLORIDE 105 12/03/2019    CO2 27 12/03/2019    ANIONGAP 7 12/03/2019     (H) 12/03/2019    BUN 22 12/03/2019    CR 1.18 12/03/2019    GFRESTIMATED 71 12/03/2019    GFRESTBLACK 82 12/03/2019    JOCE 9.0 12/03/2019    CHOL 165 03/05/2020    TRIG 62 03/05/2020    HDL 49 03/05/2020     (H) 03/05/2020    NHDL 116 03/05/2020    UCRR 206 05/21/2019    MICROL 7 05/21/2019    UMALCR 3.61 05/21/2019 5/7/18. Evaluation with  CDE (VB)  Last eye exam 10/2019 at [facility], no active DR noted  DM Complications:  none        , 2-children.    Works for [employer] in [employer] office  Previous gen me evaluations at [facility]         ROS: 10 point ROS neg other than the symptoms noted above in the HPI.     GENERAL: energy good; weight stable; denies fevers, chills, night sweats.   HEENT: no dysphagia, odonophagia, diplopia, neck pain  THYROID:  no apparent hyper or hypothyroid symptoms  CV: no chest pain, pressure, palpitations  LUNGS: no SOB, VARGAS, cough, wheezing   ABDOMEN: right mid abdomen pain with running; no diarrhea, constipation, abdominal pain  EXTREMITIES: no rashes, ulcers, edema  NEUROLOGY: no headaches, denies changes in vision, tingling, extremitiy numbness   MSK: no muscle aches or pains, weakness  SKIN: no rashes or lesions  : good erection function, denies nocturia  PSYCH:  stable mood, no significant anxiety or depression  ENDOCRINE: no heat or cold intolerance     Physical Exam (visual exam)  VS:  no vital signs taken for video visit  GENERAL: healthy, alert and NAD, well dressed, answering questions appropriately  EYES: eyes grossly normal to inspection, conjunctivae and sclerae normal, no exophthalmos or proptosis  THYROID:  no apparent nodules or goiter  LUNGS: no audible wheeze, cough or visible cyanosis, no visible retractions or increased work of breathing  ABDOMEN: abdomen not evaluated  EXTREMITIES: no hand tremors, limited  exam  NEUROLOGY: CN grossly intact, mentation intact and speech normal   PSYCH: mentation appears normal, affect normal/bright, judgement and insight intact, normal speech and appearance well groomed       LABS:     All pertinent notes, labs, and images personally reviewed by me.      A/P:       Encounter Diagnoses   Name      Type 1 diabetes mellitus without complication (H)      Insulin pump status      Comments:  Reviewed health history and diabetes issues.  Recent glucose trends good but post breakfast and supper hyperglycemia pattern     Plan:  Reviewed the overall T1DM management and insulin pump use.  Discussed optimal BG testing to assess glucose trends.  We reviewed insulin pump settings, basal rate and bolus dosing  Use of automated pump bolus dosing for meal/snack carb & correction dosing  Reviewed the Medtronic Carelink report data today  Reviewed the recent Carelink Guardian3 CGMS glucose trend report, in detail     Recommend:  Continue the current insulin pump, glucose sensor, and diabetes management plan.  Pump setting changes:   Basals:  Add 3-6p 0.25 unit(s)/hr   Bolus ICR: 5a 9.5 to 8.5, 5p 12 to 11     No labs ordered today but plan preappt fasting labs in 9/2020, orders placed  We have discussed having backup basal insulin pen available for long distance travel  Discussed the automatic adjustment capability of the Medtronic pump with sensor  Arrange annual dilated eye exam, fasting lipid panel testing.     Keep regular followup appointments with PCP also  Addressed patient's questions today.        More than 50% of the time spent with Mr. Jerome on counseling / coordinating his care.  Total appointment time was 20 minutes.     Follow-up:  3-4 mo     VERONICA Escalona MD, MS  Endocrinology  [facility]      Video-Visit Details    Type of service:  Video Visit    Video Start Time: 8:10 am  Video End Time: 8:26 am    Originating Location (pt. Location): home    Distant Location (provider location):   [facility]/home     Platform used for Video Visit: Vic

## 2020-06-30 ENCOUNTER — TELEPHONE (OUTPATIENT)
Dept: ENDOCRINOLOGY | Facility: CLINIC | Age: 50
End: 2020-06-30

## 2020-06-30 DIAGNOSIS — Z96.41 INSULIN PUMP STATUS: ICD-10-CM

## 2020-06-30 DIAGNOSIS — E10.9 TYPE 1 DIABETES MELLITUS WITHOUT COMPLICATION (H): ICD-10-CM

## 2020-06-30 NOTE — TELEPHONE ENCOUNTER
Reason for Call:  Medication or medication refill:    Do you use a [facility]?  Name of the pharmacy and phone number for the current request:       [facility] - [place], [place] - [address] [place] [address]       Name of the medication requested:   CONTOUR NEXT TEST test strip  400 strip  2  6/17/2020   Yes    Sig: Use to test blood sugar 4 times daily or as directed, Leana Contour Next strips, E10.9    Sent to pharmacy as: Contour Next Test In Vitro Strip    Class: E-Prescribe    Order: [id number]      insulin aspart (NOVOLOG VIAL) 100 UNITS/ML vial  50 mL  3  5/21/2019   No    Sig: Use with insulin pump, total daily dose approx 50 units    Sent to pharmacy as: insulin aspart (NOVOLOG VIAL) 100 UNITS/ML vial    Class: E-Prescribe    Order: [id number]          Other request:   [facility] is going [facility].  They are needing a prior auth for the above medications.       Can we leave a detailed message on this number? YES    Phone number patient can be reached at: Home number on file [phone] (home)    Best Time: Any     Call taken on 6/30/2020 at 2:37 PM by Tigist Helton

## 2020-07-24 ENCOUNTER — TELEPHONE (OUTPATIENT)
Dept: ENDOCRINOLOGY | Facility: CLINIC | Age: 50
End: 2020-07-24

## 2020-07-24 DIAGNOSIS — E10.9 TYPE 1 DIABETES MELLITUS WITHOUT COMPLICATION (H): ICD-10-CM

## 2020-07-24 NOTE — TELEPHONE ENCOUNTER
Reason for Call:  Medication or medication refill:    Do you use a [facility]?  Name of the pharmacy and phone number for the current request:     [facility] - [address]    Name of the medication requested: insulin aspart (NOVOLOG VIAL) 100 UNITS/ML vial     Other request: Pt states that he never received the Rx after the prior auth was approved for it. Pharma is stating they need a new Rx to fill    Can we leave a detailed message on this number? YES    Phone number patient can be reached at: Cell number on file:    Telephone Information:   Mobile [phone]     Best Time: any    Call taken on 7/24/2020 at 9:28 AM by Sarita Antony

## 2020-08-18 ENCOUNTER — TELEPHONE (OUTPATIENT)
Dept: ENDOCRINOLOGY | Facility: CLINIC | Age: 50
End: 2020-08-18

## 2020-08-18 DIAGNOSIS — E10.9 TYPE 1 DIABETES MELLITUS WITHOUT COMPLICATION (H): Primary | ICD-10-CM

## 2020-08-18 DIAGNOSIS — Z96.41 INSULIN PUMP STATUS: ICD-10-CM

## 2020-08-18 RX ORDER — INFUSION SET FOR INSULIN PUMP
INFUSION SETS-PARAPHERNALIA MISCELLANEOUS
Qty: 40 EACH | Refills: 2 | Status: CANCELLED | OUTPATIENT
Start: 2020-08-18

## 2020-08-18 NOTE — TELEPHONE ENCOUNTER
Reason for Call:  Medication or medication refill:    Do you use a [facility]?  Name of the pharmacy and phone number for the current request:     [email or web address] - [place], [place] - [address] [place] [address]    Name of the medication requested:  Insulin pump supplies:  Paradigm pump reservoir   Paradigm silhouette     Other request: Pt believes [facility] delivers these    Can we leave a detailed message on this number? YES    Phone number patient can be reached at: Cell number on file:    Telephone Information:   Mobile [phone]     Best Time: any    Call taken on 8/18/2020 at 10:12 AM by Sarita Antony

## 2020-08-19 NOTE — TELEPHONE ENCOUNTER
Spoke with Pt - states [facility] does have Pump supplies available.    TL - Please send Rx [facility] (Paradigm pump reservoir and Paradigm silhouette)    Jacque Driver MA

## 2020-08-19 NOTE — TELEPHONE ENCOUNTER
Message noted.  I have never sent Rx's for Medtronic insulin pump supplies to [facility] in my career.  I am familiar with sending these Rx's to the [employer] company or the the [facility].  If he has confirmed that these Medtronic pump supplies can be ordered through [facility], I will send them.  If not, I suggest the [facility].    Please contact patient to clarify, thanks.    VERONICA Escalona MD, MS  Endocrinology  [facility]

## 2020-08-24 NOTE — TELEPHONE ENCOUNTER
Pt called to check on this again. Left a phone number for the pharmacy if we want to call it in [phone]

## 2020-08-26 RX ORDER — INSULIN PUMP SYRINGE, 1.8 ML
1 EACH MISCELLANEOUS CONTINUOUS PRN
Qty: 40 EACH | Refills: 3 | Status: SHIPPED | OUTPATIENT
Start: 2020-08-26 | End: 2021-08-30

## 2020-08-26 RX ORDER — INFUSION SET FOR INSULIN PUMP
INFUSION SETS-PARAPHERNALIA MISCELLANEOUS
Qty: 40 EACH | Refills: 3 | Status: SHIPPED | OUTPATIENT
Start: 2020-08-26 | End: 2021-08-30

## 2020-08-26 NOTE — TELEPHONE ENCOUNTER
Message noted, though clarification needed.  I called patient and he clarified the Silhouette 43 in and Reservoir 1.8 ml supplies.  He is now using his wife's insurance and they specify [facility] mail order.  These new Medtronic pump supply Rx's sent this morning.    VERONICA Escalona MD, MS  Endocrinology  [facility]

## 2020-09-21 ENCOUNTER — TELEPHONE (OUTPATIENT)
Dept: ENDOCRINOLOGY | Facility: CLINIC | Age: 50
End: 2020-09-21

## 2020-09-21 NOTE — TELEPHONE ENCOUNTER
Spoke with pt - wanted to be sure we are able to view Carelink report. Will upload for tomorrow's VV.    Jacque Driver MA

## 2020-09-21 NOTE — TELEPHONE ENCOUNTER
Reason for Call:  Other call back    Detailed comments:  Please call back regarding downloading his diabetic data from his insulin pump     Phone Number Patient can be reached at: Home number on file [phone] (home)    Best Time: any    Can we leave a detailed message on this number? YES    Call taken on 9/21/2020 at 2:54 PM by Ramy Michelle

## 2020-09-22 ENCOUNTER — VIRTUAL VISIT (OUTPATIENT)
Dept: ENDOCRINOLOGY | Facility: CLINIC | Age: 50
End: 2020-09-22
Payer: COMMERCIAL

## 2020-09-22 DIAGNOSIS — Z96.41 INSULIN PUMP STATUS: ICD-10-CM

## 2020-09-22 DIAGNOSIS — E10.9 TYPE 1 DIABETES MELLITUS WITHOUT COMPLICATION (H): Primary | ICD-10-CM

## 2020-09-22 PROCEDURE — 95251 CONT GLUC MNTR ANALYSIS I&R: CPT | Performed by: INTERNAL MEDICINE

## 2020-09-22 PROCEDURE — 99213 OFFICE O/P EST LOW 20 MIN: CPT | Mod: GT | Performed by: INTERNAL MEDICINE

## 2020-09-22 NOTE — PROGRESS NOTES
"Vinicius Jerome is a 50 year old male who is being evaluated via a billable video visit.      The patient has been notified of following:     \"This video visit will be conducted via a call between you and your physician/provider. We have found that certain health care needs can be provided without the need for an in-person physical exam.  This service lets us provide the care you need with a video conversation.  If a prescription is necessary we can send it directly to your pharmacy.  If lab work is needed we can place an order for that and you can then stop by our lab to have the test done at a later time.    Video visits are billed at different rates depending on your insurance coverage.  Please reach out to your insurance provider with any questions.    If during the course of the call the physician/provider feels a video visit is not appropriate, you will not be charged for this service.\"    Patient has given verbal consent for Video visit? Yes  How would you like to obtain your AVS? Verbally Reviewed   If you are dropped from the video visit, the video invite should be resent to: Text to cell phone: [phone]  Will anyone else be joining your video visit? No        Recent issues:  Diabetes followup evaluation  Feeling well overall, no new health issues reported  Some frustrations with pump calibration reminders          Diagnosis of diabetes mellitus age 14, living in [facility]  ... Initial treatment with insulin injections, using Humalog, NPH, and possibly Lantus insulins  2001. Switched to insulin pump management, Medtronic pump  Has used Medtronic CGMS years ago  Previously used the Medtronic 523 pump  Fall 2018. Tried Fiasp insulin, didn't notice much difference, stopped it.. Then back to Novolog  8/2018. Upgraded to Medtronic 670G with Guardian3 sensor              Novolog in pump     Now using the [employer] Contour Link meter, tests 4x/day  Exercise:  Walking              Carries raisins during walks, usual " basal settings    5/7/18. Evaluation with  CDE (VB)    Current pump settings:       Recent pump Carelink data:         Previous  labs include:  Lab Results   Component Value Date    A1C 7.5 (H) 03/05/2020     12/03/2019    POTASSIUM 4.3 12/03/2019    CHLORIDE 105 12/03/2019    CO2 27 12/03/2019    ANIONGAP 7 12/03/2019     (H) 12/03/2019    BUN 22 12/03/2019    CR 1.18 12/03/2019    GFRESTIMATED 71 12/03/2019    GFRESTBLACK 82 12/03/2019    JOCE 9.0 12/03/2019    CHOL 165 03/05/2020    TRIG 62 03/05/2020    HDL 49 03/05/2020     (H) 03/05/2020    NHDL 116 03/05/2020    UCRR 206 05/21/2019    MICROL 7 05/21/2019    UMALCR 3.61 05/21/2019     Last eye exam 10/2019 at [facility], no active DR noted  DM Complications:  none        , 2-children.    Previously worked for [employer] in [employer], [employer] office, doing contract work now  Previous gen me evaluations at [facility]          ROS: 10 point ROS neg other than the symptoms noted above in the HPI.     GENERAL: energy good; weight stable; denies fevers, chills, night sweats.   HEENT: no dysphagia, odonophagia, diplopia, neck pain  THYROID:  no apparent hyper or hypothyroid symptoms  CV: no chest pain, pressure, palpitations  LUNGS: no SOB, VARGAS, cough, wheezing   ABDOMEN: right mid abdomen pain with running; no diarrhea, constipation, abdominal pain  EXTREMITIES: no rashes, ulcers, edema  NEUROLOGY: no headaches, denies changes in vision, tingling, extremitiy numbness   MSK: no muscle aches or pains, weakness  SKIN: no rashes or lesions  : good erection function, denies nocturia  PSYCH:  stable mood, no significant anxiety or depression  ENDOCRINE: no heat or cold intolerance     Physical Exam (visual exam)  VS:  no vital signs taken for video visit  GENERAL: healthy, alert and NAD, well dressed, answering questions appropriately  EYES: eyes grossly normal to inspection, conjunctivae and sclerae normal,  no exophthalmos or proptosis  THYROID:  no apparent nodules or goiter  LUNGS: no audible wheeze, cough or visible cyanosis, no visible retractions or increased work of breathing  ABDOMEN: abdomen not evaluated  EXTREMITIES: no hand tremors, limited exam  NEUROLOGY: CN grossly intact, mentation intact and speech normal   PSYCH: mentation appears normal, affect normal/bright, judgement and insight intact, normal speech and appearance well groomed        LABS:     All pertinent notes, labs, and images personally reviewed by me.      A/P:          Encounter Diagnoses   Name       Type 1 diabetes mellitus without complication (H)       Insulin pump status        Comments:  Reviewed health history and diabetes issues.  Recent glucose trends good but postprandial high's noted     Plan:  Reviewed the overall T1DM management and insulin pump use.  Discussed optimal BG testing to assess glucose trends.  We reviewed insulin pump settings, basal rate and bolus dosing  Use of automated pump bolus dosing for meal/snack carb & correction dosing  Reviewed the [employer] Carelink report data today  Reviewed the recent Carelink Guardian3 CGMS glucose trend report, in detail     Recommend:  Continue the current insulin pump, glucose sensor, and diabetes management plan.  No pump setting changes at this time.    Change to Humalog insulin since cheaper, per his request  Sent updated Humalog vial and Guardian3 sensor Rx[facility] today  Will review his lab tests after upcoming lab appt completed  Continue to use the pump in auto-mode as often as possible  Consider another [facility] CDE appt to review the pump and sensor, calibration issues  Arrange annual dilated eye exam, fasting lipid panel testing.     Keep regular followup appointments with PCP also  Addressed patient's questions today.        More than 50% of the time spent with Mr. Jerome on counseling / coordinating his care.  Total appointment time was  20 minutes.     Follow-up:  1/2021     VERONICA Escalona MD, MS  Endocrinology  [facility]      Video-Visit Details    Type of service:  Video Visit    Video Start Time: 10:30 am  Video End Time: 10:50 am    Originating Location (pt. Location): home    Distant Location (provider location):  [facility]    Platform used for Video Visit: [employer]

## 2020-09-22 NOTE — LETTER
"    9/22/2020         RE: Vinicius Jerome  [address] [facility] [address]        Dear Colleague,    Thank you for referring your patient, Vinicius Jerome, to the [facility]. Please see a copy of my visit note below.    Vinicius Jerome is a 50 year old male who is being evaluated via a billable video visit.      The patient has been notified of following:     \"This video visit will be conducted via a call between you and your physician/provider. We have found that certain health care needs can be provided without the need for an in-person physical exam.  This service lets us provide the care you need with a video conversation.  If a prescription is necessary we can send it directly to your pharmacy.  If lab work is needed we can place an order for that and you can then stop by our lab to have the test done at a later time.    Video visits are billed at different rates depending on your insurance coverage.  Please reach out to your insurance provider with any questions.    If during the course of the call the physician/provider feels a video visit is not appropriate, you will not be charged for this service.\"    Patient has given verbal consent for Video visit? Yes  How would you like to obtain your AVS? Verbally Reviewed   If you are dropped from the video visit, the video invite should be resent to: Text to cell phone: [phone]  Will anyone else be joining your video visit? No        Recent issues:  Diabetes followup evaluation  Feeling well overall, no new health issues reported  Some frustrations with pump calibration reminders          Diagnosis of diabetes mellitus age 14, living in [facility]  ... Initial treatment with insulin injections, using Humalog, NPH, and possibly Lantus insulins  2001. Switched to insulin pump management, Medtronic pump  Has used Medtronic CGMS years ago  Previously used the Medtronic 523 pump  Fall 2018. Tried Fiasp insulin, didn't notice much difference, " stopped it.. Then back to Novolog  8/2018. Upgraded to Medtronic 670G with Guardian3 sensor              Novolog in pump     Now using the [employer] Contour Link meter, tests 4x/day  Exercise:  Walking              Carries raisins during walks, usual basal settings    5/7/18. Evaluation with  CDE (VB)    Current pump settings:       Recent pump Carelink data:         Previous  labs include:  Lab Results   Component Value Date    A1C 7.5 (H) 03/05/2020     12/03/2019    POTASSIUM 4.3 12/03/2019    CHLORIDE 105 12/03/2019    CO2 27 12/03/2019    ANIONGAP 7 12/03/2019     (H) 12/03/2019    BUN 22 12/03/2019    CR 1.18 12/03/2019    GFRESTIMATED 71 12/03/2019    GFRESTBLACK 82 12/03/2019    JOCE 9.0 12/03/2019    CHOL 165 03/05/2020    TRIG 62 03/05/2020    HDL 49 03/05/2020     (H) 03/05/2020    NHDL 116 03/05/2020    UCRR 206 05/21/2019    MICROL 7 05/21/2019    UMALCR 3.61 05/21/2019     Last eye exam 10/2019 at [facility], no active DR noted  DM Complications:  none        , 2-children.    Previously worked for [employer] in [employer], [employer] office, doing contract work now  Previous gen me evaluations at [facility]          ROS: 10 point ROS neg other than the symptoms noted above in the HPI.     GENERAL: energy good; weight stable; denies fevers, chills, night sweats.   HEENT: no dysphagia, odonophagia, diplopia, neck pain  THYROID:  no apparent hyper or hypothyroid symptoms  CV: no chest pain, pressure, palpitations  LUNGS: no SOB, VARGAS, cough, wheezing   ABDOMEN: right mid abdomen pain with running; no diarrhea, constipation, abdominal pain  EXTREMITIES: no rashes, ulcers, edema  NEUROLOGY: no headaches, denies changes in vision, tingling, extremitiy numbness   MSK: no muscle aches or pains, weakness  SKIN: no rashes or lesions  : good erection function, denies nocturia  PSYCH:  stable mood, no significant anxiety or depression  ENDOCRINE: no heat or  cold intolerance     Physical Exam (visual exam)  VS:  no vital signs taken for video visit  GENERAL: healthy, alert and NAD, well dressed, answering questions appropriately  EYES: eyes grossly normal to inspection, conjunctivae and sclerae normal, no exophthalmos or proptosis  THYROID:  no apparent nodules or goiter  LUNGS: no audible wheeze, cough or visible cyanosis, no visible retractions or increased work of breathing  ABDOMEN: abdomen not evaluated  EXTREMITIES: no hand tremors, limited exam  NEUROLOGY: CN grossly intact, mentation intact and speech normal   PSYCH: mentation appears normal, affect normal/bright, judgement and insight intact, normal speech and appearance well groomed        LABS:     All pertinent notes, labs, and images personally reviewed by me.      A/P:          Encounter Diagnoses   Name       Type 1 diabetes mellitus without complication (H)       Insulin pump status        Comments:  Reviewed health history and diabetes issues.  Recent glucose trends good but postprandial high's noted     Plan:  Reviewed the overall T1DM management and insulin pump use.  Discussed optimal BG testing to assess glucose trends.  We reviewed insulin pump settings, basal rate and bolus dosing  Use of automated pump bolus dosing for meal/snack carb & correction dosing  Reviewed the [employer] Carelink report data today  Reviewed the recent Carelink Guardian3 CGMS glucose trend report, in detail     Recommend:  Continue the current insulin pump, glucose sensor, and diabetes management plan.  No pump setting changes at this time.    Change to Humalog insulin since cheaper, per his request  Sent updated Humalog vial and Guardian3 sensor Rx[facility] today  Will review his lab tests after upcoming lab appt completed  Continue to use the pump in auto-mode as often as possible  Consider another [facility] CDE appt to review the pump and sensor, calibration issues  Arrange annual dilated eye exam, fasting lipid  panel testing.     Keep regular followup appointments with PCP also  Addressed patient's questions today.        More than 50% of the time spent with Mr. Jerome on counseling / coordinating his care.  Total appointment time was 20 minutes.     Follow-up:  1/2021     VERONICA Escalona MD, MS  Endocrinology  [facility]      Video-Visit Details    Type of service:  Video Visit    Video Start Time: 10:30 am  Video End Time: 10:50 am    Originating Location (pt. Location): [place]    Distant Location (provider location):  [facility]/[place]    Platform used for Video Visit: Well            Again, thank you for allowing me to participate in the care of your patient.        Sincerely,        Julien Escalona MD

## 2020-10-01 DIAGNOSIS — E10.3393 TYPE 1 DIABETES MELLITUS WITH MODERATE NONPROLIFERATIVE RETINOPATHY OF BOTH EYES WITHOUT MACULAR EDEMA (H): ICD-10-CM

## 2020-10-01 LAB
ANION GAP SERPL CALCULATED.3IONS-SCNC: 7 MMOL/L (ref 3–14)
BUN SERPL-MCNC: 20 MG/DL (ref 7–30)
CALCIUM SERPL-MCNC: 9.3 MG/DL (ref 8.5–10.1)
CHLORIDE SERPL-SCNC: 106 MMOL/L (ref 94–109)
CHOLEST SERPL-MCNC: 163 MG/DL
CO2 SERPL-SCNC: 27 MMOL/L (ref 20–32)
CREAT SERPL-MCNC: 1.38 MG/DL (ref 0.66–1.25)
GFR SERPL CREATININE-BSD FRML MDRD: 58 ML/MIN/{1.73_M2}
GLUCOSE SERPL-MCNC: 82 MG/DL (ref 70–99)
HBA1C MFR BLD: 7.3 % (ref 0–5.6)
HDLC SERPL-MCNC: 47 MG/DL
LDLC SERPL CALC-MCNC: 102 MG/DL
NONHDLC SERPL-MCNC: 116 MG/DL
POTASSIUM SERPL-SCNC: 4.1 MMOL/L (ref 3.4–5.3)
SODIUM SERPL-SCNC: 140 MMOL/L (ref 133–144)
TRIGL SERPL-MCNC: 70 MG/DL

## 2020-10-01 PROCEDURE — 80061 LIPID PANEL: CPT | Performed by: INTERNAL MEDICINE

## 2020-10-01 PROCEDURE — 80048 BASIC METABOLIC PNL TOTAL CA: CPT | Performed by: INTERNAL MEDICINE

## 2020-10-01 PROCEDURE — 83036 HEMOGLOBIN GLYCOSYLATED A1C: CPT | Performed by: INTERNAL MEDICINE

## 2020-10-01 PROCEDURE — 36415 COLL VENOUS BLD VENIPUNCTURE: CPT | Performed by: INTERNAL MEDICINE

## 2020-10-27 ENCOUNTER — TRANSFERRED RECORDS (OUTPATIENT)
Dept: HEALTH INFORMATION MANAGEMENT | Facility: CLINIC | Age: 50
End: 2020-10-27

## 2020-10-27 LAB — RETINOPATHY: NEGATIVE

## 2020-11-16 ENCOUNTER — HEALTH MAINTENANCE LETTER (OUTPATIENT)
Age: 50
End: 2020-11-16

## 2020-12-07 DIAGNOSIS — E10.9 TYPE 1 DIABETES MELLITUS WITHOUT COMPLICATION (H): ICD-10-CM

## 2020-12-07 DIAGNOSIS — Z96.41 INSULIN PUMP STATUS: ICD-10-CM

## 2020-12-21 ENCOUNTER — VIRTUAL VISIT (OUTPATIENT)
Dept: ENDOCRINOLOGY | Facility: CLINIC | Age: 50
End: 2020-12-21
Payer: COMMERCIAL

## 2020-12-21 DIAGNOSIS — Z96.41 INSULIN PUMP STATUS: ICD-10-CM

## 2020-12-21 DIAGNOSIS — E10.9 TYPE 1 DIABETES MELLITUS WITHOUT COMPLICATION (H): Primary | ICD-10-CM

## 2020-12-21 PROCEDURE — 95251 CONT GLUC MNTR ANALYSIS I&R: CPT | Performed by: INTERNAL MEDICINE

## 2020-12-21 PROCEDURE — 99213 OFFICE O/P EST LOW 20 MIN: CPT | Mod: GT | Performed by: INTERNAL MEDICINE

## 2020-12-21 NOTE — PROGRESS NOTES
"Vinicius Jerome is a 50 year old male who is being evaluated via a billable video visit.      The patient has been notified of following:     \"This video visit will be conducted via a call between you and your physician/provider. We have found that certain health care needs can be provided without the need for an in-person physical exam.  This service lets us provide the care you need with a video conversation.  If a prescription is necessary we can send it directly to your pharmacy.  If lab work is needed we can place an order for that and you can then stop by our lab to have the test done at a later time.    Video visits are billed at different rates depending on your insurance coverage.  Please reach out to your insurance provider with any questions.    If during the course of the call the physician/provider feels a video visit is not appropriate, you will not be charged for this service.\"    Patient has given verbal consent for Video visit? Yes  How would you like to obtain your AVS? Verbally Reviewed  If you are dropped from the video visit, the video invite should be resent to: Cellphone  Will anyone else be joining your video visit? No      Recent issues:  Diabetes followup evaluation  Feeling well overall, no new health issues reported           Diagnosis of diabetes mellitus age 14, living in [facility]  ... Initial treatment with insulin injections, using Humalog, NPH, and possibly Lantus insulins  2001. Switched to insulin pump management, Medtronic pump  Has used Medtronic CGMS years ago  Previously used the Medtronic 523 pump  Fall 2018. Tried Fiasp insulin, didn't notice much difference, stopped it.. Then back to Novolog  8/2018. Upgraded to Medtronic 670G with Guardian3 sensor              Novolog in pump     Now using the [employer] Contour Link meter, tests 4x/day  Exercise:  Walking              Carries raisins during walks, usual basal settings     5/7/18. Evaluation with SANDY TAYLORE (VB)     Current pump " settings:       Recent pump Carelink data:         Previous  labs include:  Lab Results   Component Value Date    A1C 7.3 (H) 10/01/2020     10/01/2020    POTASSIUM 4.1 10/01/2020    CHLORIDE 106 10/01/2020    CO2 27 10/01/2020    ANIONGAP 7 10/01/2020    GLC 82 10/01/2020    BUN 20 10/01/2020    CR 1.38 (H) 10/01/2020    GFRESTIMATED 58 (L) 10/01/2020    GFRESTBLACK 67 10/01/2020    JOCE 9.3 10/01/2020    CHOL 163 10/01/2020    TRIG 70 10/01/2020    HDL 47 10/01/2020     (H) 10/01/2020    NHDL 116 10/01/2020    UCRR 206 05/21/2019    MICROL 7 05/21/2019    UMALCR 3.61 05/21/2019     Last eye exam 10/2019 at [facility], no active DR noted  DM Complications:  none        , 2-children.    Previously worked for [employer] in [employer], [employer] office, doing contract work now  Previous gen me evaluations at [facility]          ROS: 10 point ROS neg other than the symptoms noted above in the HPI.     GENERAL: energy good; weight stable; denies fevers, chills, night sweats.   HEENT: no dysphagia, odonophagia, diplopia, neck pain  THYROID:  no apparent hyper or hypothyroid symptoms  CV: no chest pain, pressure, palpitations  LUNGS: no SOB, VARGAS, cough, wheezing   ABDOMEN: right mid abdomen pain with running; no diarrhea, constipation, abdominal pain  EXTREMITIES: no rashes, ulcers, edema  NEUROLOGY: no headaches, denies changes in vision, tingling, extremitiy numbness   MSK: no muscle aches or pains, weakness  SKIN: no rashes or lesions  : good erection function, denies nocturia  PSYCH:  stable mood, no significant anxiety or depression  ENDOCRINE: no heat or cold intolerance     Physical Exam (visual exam)  VS:  no vital signs taken for video visit  CONSTITUTIONAL: healthy, alert and NAD, well dressed, answering questions appropriately  ENT: no nose swelling or nasal discharge, mouth redness or gum changes.  EYES: eyes grossly normal to inspection, conjunctivae and  sclerae normal, no exophthalmos or proptosis  THYROID:  no apparent nodules or goiter  LUNGS: no audible wheeze, cough or visible cyanosis, no visible retractions or increased work of breathing  ABDOMEN: abdomen not evaluated  EXTREMITIES: no hand tremors, limited exam  NEUROLOGY: CN grossly intact, mentation intact and speech normal   SKIN:  no apparent skin lesions, rash, or edema with visualized skin appearance  PSYCH: mentation appears normal, affect normal/bright, judgement and insight intact,   normal speech and appearance well groomed       LABS:     All pertinent notes, labs, and images personally reviewed by me.      A/P:          Encounter Diagnoses   Name       Type 1 diabetes mellitus without complication (H)       Insulin pump status        Comments:  Reviewed health history and diabetes issues.  Recent glucose trends good but breakfast postprandial high's noted  Discussed his questions about blood creatinine rise trend     Plan:  Reviewed the overall T1DM management and insulin pump use.  Discussed optimal BG testing to assess glucose trends.  We reviewed insulin pump settings, basal rate and bolus dosing  Use of automated pump bolus dosing for meal/snack carb & correction dosing  Reviewed the [email or web address] Carelink report data today  Reviewed the recent Carelink Guardian3 CGMS glucose trend report, in detail     Recommend:  Continue the current insulin pump, glucose sensor, and diabetes management plan.  Pump setting changes:   Bolus ICR:  6a 8.5 to 8.0     Continue the Humalog or consider trying the new fast acting Humalog insulin  Lab testing:   Plan repeat lab appt in late 1/2021   Lab orders placed  Continue to use the pump in auto-mode as often as possible  Consider another [facility] CDE appt to review the pump and sensor, calibration issues  Arrange annual dilated eye exam, fasting lipid panel testing.     Keep regular followup appointments with PCP also  Addressed patient's questions today.        More  than 50% of the time spent with Mr. Jerome on counseling / coordinating his care.  Total appointment time was 20 minutes.     Follow-up:  early 4/2021     VERONICA Escalona MD, MS  Endocrinology  [facility]        Video-Visit Details    Type of service:  Video Visit    Video Start Time: 10:40 am  Video End Time: 11:00 am    Originating Location (pt. Location): [place]    Distant Location (provider location):  [facility]/[place]    Platform used for Video Visit: Kinza

## 2020-12-21 NOTE — LETTER
"    12/21/2020         RE: Vinicius Jerome  [id number] [address] [id number]        Dear Colleague,    Thank you for referring your patient, Vinicius Jerome, to the [facility]. Please see a copy of my visit note below.    Vinicius Jerome is a 50 year old male who is being evaluated via a billable video visit.      The patient has been notified of following:     \"This video visit will be conducted via a call between you and your physician/provider. We have found that certain health care needs can be provided without the need for an in-person physical exam.  This service lets us provide the care you need with a video conversation.  If a prescription is necessary we can send it directly to your pharmacy.  If lab work is needed we can place an order for that and you can then stop by our lab to have the test done at a later time.    Video visits are billed at different rates depending on your insurance coverage.  Please reach out to your insurance provider with any questions.    If during the course of the call the physician/provider feels a video visit is not appropriate, you will not be charged for this service.\"    Patient has given verbal consent for Video visit? Yes  How would you like to obtain your AVS? Verbally Reviewed  If you are dropped from the video visit, the video invite should be resent to: Cellphone  Will anyone else be joining your video visit? No      Recent issues:  Diabetes followup evaluation  Feeling well overall, no new health issues reported           Diagnosis of diabetes mellitus age 14, living in [facility]  ... Initial treatment with insulin injections, using Humalog, NPH, and possibly Lantus insulins  2001. Switched to insulin pump management, Medtronic pump  Has used Medtronic CGMS years ago  Previously used the Medtronic 523 pump  Fall 2018. Tried Fiasp insulin, didn't notice much difference, stopped it.. Then back to Novolog  8/2018. Upgraded to Medtronic 670G " with Guardian3 sensor              Novolog in pump     Now using the [employer] Contour Link meter, tests 4x/day  Exercise:  Walking              Carries raisins during walks, usual basal settings     5/7/18. Evaluation with  CDE (DEJON)     Current pump settings:       Recent pump Carelink data:         Previous  labs include:  Lab Results   Component Value Date    A1C 7.3 (H) 10/01/2020     10/01/2020    POTASSIUM 4.1 10/01/2020    CHLORIDE 106 10/01/2020    CO2 27 10/01/2020    ANIONGAP 7 10/01/2020    GLC 82 10/01/2020    BUN 20 10/01/2020    CR 1.38 (H) 10/01/2020    GFRESTIMATED 58 (L) 10/01/2020    GFRESTBLACK 67 10/01/2020    JOCE 9.3 10/01/2020    CHOL 163 10/01/2020    TRIG 70 10/01/2020    HDL 47 10/01/2020     (H) 10/01/2020    NHDL 116 10/01/2020    UCRR 206 05/21/2019    MICROL 7 05/21/2019    UMALCR 3.61 05/21/2019     Last eye exam 10/2019 at [facility], no active DR noted  DM Complications:  none        , 2-children.    Previously worked for [employer] in [employer], [employer] office, doing contract work now  Previous gen me evaluations at [facility]          ROS: 10 point ROS neg other than the symptoms noted above in the HPI.     GENERAL: energy good; weight stable; denies fevers, chills, night sweats.   HEENT: no dysphagia, odonophagia, diplopia, neck pain  THYROID:  no apparent hyper or hypothyroid symptoms  CV: no chest pain, pressure, palpitations  LUNGS: no SOB, VARGAS, cough, wheezing   ABDOMEN: right mid abdomen pain with running; no diarrhea, constipation, abdominal pain  EXTREMITIES: no rashes, ulcers, edema  NEUROLOGY: no headaches, denies changes in vision, tingling, extremitiy numbness   MSK: no muscle aches or pains, weakness  SKIN: no rashes or lesions  : good erection function, denies nocturia  PSYCH:  stable mood, no significant anxiety or depression  ENDOCRINE: no heat or cold intolerance     Physical Exam (visual exam)  VS:  no vital  signs taken for video visit  CONSTITUTIONAL: healthy, alert and NAD, well dressed, answering questions appropriately  ENT: no nose swelling or nasal discharge, mouth redness or gum changes.  EYES: eyes grossly normal to inspection, conjunctivae and sclerae normal, no exophthalmos or proptosis  THYROID:  no apparent nodules or goiter  LUNGS: no audible wheeze, cough or visible cyanosis, no visible retractions or increased work of breathing  ABDOMEN: abdomen not evaluated  EXTREMITIES: no hand tremors, limited exam  NEUROLOGY: CN grossly intact, mentation intact and speech normal   SKIN:  no apparent skin lesions, rash, or edema with visualized skin appearance  PSYCH: mentation appears normal, affect normal/bright, judgement and insight intact,   normal speech and appearance well groomed       LABS:     All pertinent notes, labs, and images personally reviewed by me.      A/P:          Encounter Diagnoses   Name       Type 1 diabetes mellitus without complication (H)       Insulin pump status        Comments:  Reviewed health history and diabetes issues.  Recent glucose trends good but breakfast postprandial high's noted  Discussed his questions about blood creatinine rise trend     Plan:  Reviewed the overall T1DM management and insulin pump use.  Discussed optimal BG testing to assess glucose trends.  We reviewed insulin pump settings, basal rate and bolus dosing  Use of automated pump bolus dosing for meal/snack carb & correction dosing  Reviewed the [employer] Carelink report data today  Reviewed the recent Carelink Guardian3 CGMS glucose trend report, in detail     Recommend:  Continue the current insulin pump, glucose sensor, and diabetes management plan.  Pump setting changes:   Bolus ICR:  6a 8.5 to 8.0     Continue the Humalog or consider trying the new fast acting Humalog insulin  Lab testing:   Plan repeat lab appt in late 1/2021   Lab orders placed  Continue to use the pump in auto-mode as often as  possible  Consider another [facility] CDE appt to review the pump and sensor, calibration issues  Arrange annual dilated eye exam, fasting lipid panel testing.     Keep regular followup appointments with PCP also  Addressed patient's questions today.        More than 50% of the time spent with Mr. Jerome on counseling / coordinating his care.  Total appointment time was 20 minutes.     Follow-up:  early 4/2021     VERONICA Escalona MD, MS  Endocrinology  [facility]        Video-Visit Details    Type of service:  Video Visit    Video Start Time: 10:40 am  Video End Time: 11:00 am    Originating Location (pt. Location): home    Distant Location (provider location):  [facility] [place]    Platform used for Video Visit: [employer]            Again, thank you for allowing me to participate in the care of your patient.        Sincerely,        Julien Escalona MD

## 2021-01-25 DIAGNOSIS — E10.9 TYPE 1 DIABETES MELLITUS WITHOUT COMPLICATION (H): ICD-10-CM

## 2021-01-25 LAB
ALT SERPL W P-5'-P-CCNC: 25 U/L (ref 0–70)
ANION GAP SERPL CALCULATED.3IONS-SCNC: 1 MMOL/L (ref 3–14)
BUN SERPL-MCNC: 23 MG/DL (ref 7–30)
CALCIUM SERPL-MCNC: 8.7 MG/DL (ref 8.5–10.1)
CHLORIDE SERPL-SCNC: 108 MMOL/L (ref 94–109)
CO2 SERPL-SCNC: 29 MMOL/L (ref 20–32)
CREAT SERPL-MCNC: 1.15 MG/DL (ref 0.66–1.25)
GFR SERPL CREATININE-BSD FRML MDRD: 72 ML/MIN/{1.73_M2}
GLUCOSE SERPL-MCNC: 195 MG/DL (ref 70–99)
HBA1C MFR BLD: 7.3 % (ref 0–5.6)
POTASSIUM SERPL-SCNC: 4.2 MMOL/L (ref 3.4–5.3)
SODIUM SERPL-SCNC: 138 MMOL/L (ref 133–144)

## 2021-01-25 PROCEDURE — 83036 HEMOGLOBIN GLYCOSYLATED A1C: CPT | Performed by: INTERNAL MEDICINE

## 2021-01-25 PROCEDURE — 36415 COLL VENOUS BLD VENIPUNCTURE: CPT | Performed by: INTERNAL MEDICINE

## 2021-01-25 PROCEDURE — 84460 ALANINE AMINO (ALT) (SGPT): CPT | Performed by: INTERNAL MEDICINE

## 2021-01-25 PROCEDURE — 80048 BASIC METABOLIC PNL TOTAL CA: CPT | Performed by: INTERNAL MEDICINE

## 2021-03-10 ENCOUNTER — MYC MEDICAL ADVICE (OUTPATIENT)
Dept: ENDOCRINOLOGY | Facility: CLINIC | Age: 51
End: 2021-03-10

## 2021-04-03 ENCOUNTER — HEALTH MAINTENANCE LETTER (OUTPATIENT)
Age: 51
End: 2021-04-03

## 2021-04-05 ENCOUNTER — VIRTUAL VISIT (OUTPATIENT)
Dept: ENDOCRINOLOGY | Facility: CLINIC | Age: 51
End: 2021-04-05
Payer: COMMERCIAL

## 2021-04-05 DIAGNOSIS — E10.9 TYPE 1 DIABETES MELLITUS WITHOUT COMPLICATION (H): Primary | ICD-10-CM

## 2021-04-05 DIAGNOSIS — Z96.41 INSULIN PUMP STATUS: ICD-10-CM

## 2021-04-05 PROCEDURE — 99213 OFFICE O/P EST LOW 20 MIN: CPT | Mod: GT | Performed by: INTERNAL MEDICINE

## 2021-04-05 PROCEDURE — 95251 CONT GLUC MNTR ANALYSIS I&R: CPT | Performed by: INTERNAL MEDICINE

## 2021-04-05 RX ORDER — INSULIN GLARGINE 100 [IU]/ML
11 INJECTION, SOLUTION SUBCUTANEOUS DAILY
Qty: 10 ML | Refills: 1 | Status: SHIPPED | OUTPATIENT
Start: 2021-04-05

## 2021-04-05 NOTE — PROGRESS NOTES
Vinicius is a 51 year old who is being evaluated via a billable video visit.      How would you like to obtain your AVS? Verbally Reviewed  If the video visit is dropped, the invitation should be resent by: Cellphone  Will anyone else be joining your video visit? No      Video Start Time: 8:03 am      Recent issues:  Diabetes followup evaluation  Feeling well overall, no new health issues reported  Not in auto mode as often recently, having some significant high and low SG levels           Diagnosis of diabetes mellitus age 14, living in [facility]  ... Initial treatment with insulin injections, using Humalog, NPH, and possibly Lantus insulins  2001. Switched to insulin pump management, Medtronic pump  Has used Medtronic CGMS years ago  Previously used the Medtronic 523 pump  Fall 2018. Tried Fiasp insulin, didn't notice much difference, stopped it.. Then back to Novolog  8/2018. Upgraded to Medtronic 670G with Guardian3 sensor              Novolog in pump     Now using the [employer] Contour Link meter, tests 4x/day  Exercise:  Walking              Carries raisins during walks, usual basal settings     5/7/18. Evaluation with [facility] CDE ([facility])     Current pump settings:       Recent pump Carelink data:         Previous [facility] labs include:  Lab Results   Component Value Date    A1C 7.3 (H) 01/25/2021     01/25/2021    POTASSIUM 4.2 01/25/2021    CHLORIDE 108 01/25/2021    CO2 29 01/25/2021    ANIONGAP 1 (L) 01/25/2021     (H) 01/25/2021    BUN 23 01/25/2021    CR 1.15 01/25/2021    GFRESTIMATED 72 01/25/2021    GFRESTBLACK 84 01/25/2021    JOCE 8.7 01/25/2021    CHOL 163 10/01/2020    TRIG 70 10/01/2020    HDL 47 10/01/2020     (H) 10/01/2020    NHDL 116 10/01/2020    UCRR 206 05/21/2019    MICROL 7 05/21/2019    UMALCR 3.61 05/21/2019     Last eye exam 10/2020 at [facility], no active DR noted  DM Complications:  none        , 2-children.    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          ROS: 10 point ROS neg other than the symptoms noted above in the HPI.     GENERAL: energy good; weight stable; denies fevers, chills, night sweats.   HEENT: no dysphagia, odonophagia, diplopia, neck pain  THYROID:  no apparent hyper or hypothyroid symptoms  CV: no chest pain, pressure, palpitations  LUNGS: no SOB, VARGAS, cough, wheezing   ABDOMEN: right mid abdomen pain with running; no diarrhea, constipation, abdominal pain  EXTREMITIES: no rashes, ulcers, edema  NEUROLOGY: no headaches, denies changes in vision, tingling, extremitiy numbness   MSK: no muscle aches or pains, weakness  SKIN: no rashes or lesions  : good erection function, denies nocturia  PSYCH:  stable mood, no significant anxiety or depression  ENDOCRINE: no heat or cold intolerance     Physical Exam (visual exam)  VS:  no vital signs taken for video visit  CONSTITUTIONAL: healthy, alert and NAD, well dressed, answering questions appropriately  ENT: no nose swelling or nasal discharge, mouth redness or gum changes.  EYES: eyes grossly normal to inspection, conjunctivae and sclerae normal, no exophthalmos or proptosis  THYROID:  no apparent nodules or goiter  LUNGS: no audible wheeze, cough or visible cyanosis, no visible retractions or increased work of breathing  ABDOMEN: abdomen not evaluated  EXTREMITIES: no hand tremors, limited exam  NEUROLOGY: CN grossly intact, mentation intact and speech normal   SKIN:  no apparent skin lesions, rash, or edema with visualized skin appearance  PSYCH: mentation appears normal, affect normal/bright, judgement and insight intact,   normal speech and appearance well groomed       LABS:     All pertinent notes, labs, and images personally reviewed by me.      A/P:          Encounter Diagnoses   Name       Type 1 diabetes mellitus without complication (H)       Insulin pump status        Comments:  Reviewed health  history and diabetes issues.  We discussed issues with getting out of auto-mode with Guardian3 sensor and pump  Reviewed and interpreted tests that I previously ordered.   Ordered appropriate tests for the endocrinology disease management.    Management options discussed and implemented after shared medical decision making with the patient.     Plan:  Reviewed the overall T1DM management and insulin pump use.  Discussed optimal BG testing to assess glucose trends.  We reviewed insulin pump settings, basal rate and bolus dosing  Use of automated pump bolus dosing for meal/snack carb & correction dosing  Reviewed the [employer] Carelink report data today  Reviewed the recent Carelink Guardian3 CGMS glucose trend report, in detail     Recommend:  Continue the current insulin pump, glucose sensor, and diabetes management plan.  Pump setting changes:   Bolus ICR:  5a 8.5 to 8.0   Basal 3p 0.3 to 0.25 unit(s)/hr     Continue the Humalog or consider trying the new fast acting Humalog insulin, discussed previously  No lab testing at this time  Continue to use the pump in auto-mode as often as possible  Discussed the backup insulin for his vacation   Discussed increased insulin sensitivity with aerobic exercise, hiking and changes with insulin dosing   He prefers vial/syringe method, needs Lantus vial/ for his trip  Consider another [facility] CDE appt to review the pump and sensor, calibration issues  Arrange annual dilated eye exam, fasting lipid panel testing.     Keep regular followup appointments with PCP also  Addressed patient's questions today.     Total time spent in with the patient evaluation:  23 min  Additional time spent reviewing pertinent lab tests and chart notes, and documentation:  5 min    Follow-up:  7/2021    VERONICA Escalona MD, MS  Endocrinology  [facility]        Video-Visit Details    Type of service:  Video Visit    Video End Time: 8:25 am    Originating Location (pt. Location): home    Distant Location  (provider location):  [facility] [place]    Platform used for Video Visit: Kinza

## 2021-06-30 ENCOUNTER — TELEPHONE (OUTPATIENT)
Dept: ENDOCRINOLOGY | Facility: CLINIC | Age: 51
End: 2021-06-30

## 2021-06-30 NOTE — TELEPHONE ENCOUNTER
Received form(s) from [employer]  Placed form(s) in/on TL's incoming basket.  Forms need to be filled out and signed and faxed to number listed on form.     Copy needs to be sent for scanning after completion: Yes     Jacque Driver MA

## 2021-07-12 ENCOUNTER — OFFICE VISIT (OUTPATIENT)
Dept: ENDOCRINOLOGY | Facility: CLINIC | Age: 51
End: 2021-07-12
Payer: COMMERCIAL

## 2021-07-12 VITALS
BODY MASS INDEX: 29.18 KG/M2 | WEIGHT: 203.4 LBS | OXYGEN SATURATION: 98 % | DIASTOLIC BLOOD PRESSURE: 66 MMHG | HEART RATE: 59 BPM | SYSTOLIC BLOOD PRESSURE: 112 MMHG

## 2021-07-12 DIAGNOSIS — Z96.41 INSULIN PUMP STATUS: ICD-10-CM

## 2021-07-12 DIAGNOSIS — E10.9 TYPE 1 DIABETES MELLITUS WITHOUT COMPLICATION (H): Primary | ICD-10-CM

## 2021-07-12 PROCEDURE — 99214 OFFICE O/P EST MOD 30 MIN: CPT | Performed by: INTERNAL MEDICINE

## 2021-07-12 NOTE — PROGRESS NOTES
Recent issues:  Diabetes followup evaluation  Feeling well overall, no new health issues reported  Trip to [facility] from4/2021 to 6/2021, trip went well overall  Not wearing his Guardian3 sensor past month, his sensor  broke and he's waiting on replacement            Diagnosis of diabetes mellitus age 14, living in [facility]  ... Initial treatment with insulin injections, using Humalog, NPH, and possibly Lantus insulins  2001. Switched to insulin pump management, Medtronic pump  Has used Medtronic CGMS years ago  Previously used the Medtronic 523 pump  Fall 2018. Tried Fiasp insulin, didn't notice much difference, stopped it.. Then back to Novolog  8/2018. Upgraded to Medtronic 670G with Guardian3 sensor              Novolog in pump     Now using the [employer] Contour Link meter, tests 4x/day  Exercise:  Walking              Carries raisins during walks, usual basal settings     5/7/18. Evaluation with  CDE ([facility])  Current pump settings:       Recent pump Carelink data:         Previous  labs include:  Lab Results   Component Value Date    A1C 7.3 (H) 01/25/2021     01/25/2021    POTASSIUM 4.2 01/25/2021    CHLORIDE 108 01/25/2021    CO2 29 01/25/2021    ANIONGAP 1 (L) 01/25/2021     (H) 01/25/2021    BUN 23 01/25/2021    CR 1.15 01/25/2021    GFRESTIMATED 72 01/25/2021    GFRESTBLACK 84 01/25/2021    JOCE 8.7 01/25/2021    CHOL 163 10/01/2020    TRIG 70 10/01/2020    HDL 47 10/01/2020     (H) 10/01/2020    NHDL 116 10/01/2020    UCRR 206 05/21/2019    MICROL 7 05/21/2019    UMALCR 3.61 05/21/2019     Last eye exam 10/2020 at [facility], no active DR noted  DM Complications:  none        , 2-children.    Previously worked for [employer] in [employer] office, now retired  Previous gen me evaluations at [facility]          ROS: 10 point ROS neg other than the symptoms noted above in the HPI.     GENERAL: energy good; weight  stable; denies fevers, chills, night sweats.   HEENT: no dysphagia, odonophagia, diplopia, neck pain  THYROID:  no apparent hyper or hypothyroid symptoms  CV: no chest pain, pressure, palpitations  LUNGS: no SOB, VARGAS, cough, wheezing   ABDOMEN: right mid abdomen pain with running; no diarrhea, constipation, abdominal pain  EXTREMITIES: mild finger tremors; no rashes, ulcers, edema  NEUROLOGY: no headaches, denies changes in vision, tingling, extremitiy numbness   MSK: no muscle aches or pains, weakness  SKIN: no rashes or lesions  : good erection function, denies nocturia  PSYCH:  stable mood, no significant anxiety or depression  ENDOCRINE: no heat or cold intolerance     Physical Exam   VS: /66   Pulse 59   Wt 92.3 kg (203 lb 6.4 oz)   SpO2 98%   BMI 29.18 kg/m    GENERAL: AXOX3, NAD, well dressed, answering questions appropriately, appears stated age.  ENT: no nose swelling or nasal discharge, mouth redness or gum changes.  EYES: eyes grossly normal to inspection, conjunctivae and sclerae normal, no exophthalmos or proptosis  THYROID:  no apparent nodules or goiter  CV:  RRR without murmurs  LUNGS: CTA posteriorly  ABDOMEN: abdomen normal size  EXTREMITIES: subtle 5th finger tremors; no lower edema noted  NEUROLOGY: CN grossly intact, no tremors  MSK: grossly intact  SKIN:  Small area of black discoloration at great toenails; no apparent skin lesions, rash, or edema with visualized skin appearance  PSYCH: mentation appears normal, affect normal/bright, judgement and insight intact,   normal speech and appearance well groomed    LABS:     All pertinent notes, labs, and images personally reviewed by me.      A/P:          Encounter Diagnoses   Name       Type 1 diabetes mellitus without complication (H)       Insulin pump status        Comments:  Reviewed health history and diabetes issues.  We discussed issues with getting out of auto-mode with Guardian3 sensor and pump  Reviewed and interpreted tests  that I previously ordered.   Ordered appropriate tests for the endocrinology disease management.    Management options discussed and implemented after shared medical decision making with the patient.  T1DM problem is chronic-stable, controlled    Plan:  Reviewed the overall T1DM management and insulin pump use.  Discussed optimal BG testing to assess glucose trends.  We reviewed insulin pump settings, basal rate and bolus dosing  Use of automated pump bolus dosing for meal/snack carb & correction dosing  Reviewed the Medtronic Carelink report data today     Recommend:  Continue the current Medtronic insulin pump and diabetes management plan.  Pump setting changes:   Consider bolus ICR 5p 9.0 to 8.8    Resume Guardian3 sensor use soon when  available, use the pump in auto-mode as often as possible  Contact our office if new issues after restarting the Guardian3 sensor and auto mode   Continue the Humalog or consider trying the new fast acting Humalog insulin, discussed previously  Check lab tests today   Discussed option of nearby [facility] clinic   Lab orders placed  Consider another  CDE appt to review the pump and sensor, calibration issues  Check TSH level to screen for hyperthyroidism with his subtle finger tremors  Arrange annual dilated eye exam, fasting lipid panel testing.     Keep regular followup appointments with PCP also  Addressed patient's questions today.    There are no Patient Instructions on file for this visit.    Future labs ordered today:   Orders Placed This Encounter   Procedures     Hemoglobin A1c     Basic metabolic panel  (Ca, Cl, CO2, Creat, Gluc, K, Na, BUN)     **TSH with free T4 reflex FUTURE 2mo     Radiology/Consults ordered today: None    Total time spent in with the patient evaluation:  22 min  Additional time spent reviewing pertinent lab tests and chart notes, and documentation: 8 min    Follow-up:  10/2021    VERONICA Escalona MD, MS  Endocrinology  [facility]  [place]

## 2021-07-12 NOTE — LETTER
7/12/2021         RE: Vinicius Jerome  [address] [address] [id number]        Dear Colleague,    Thank you for referring your patient, Vinicius Jerome, to the [facility] [place]. Please see a copy of my visit note below.      Recent issues:  Diabetes followup evaluation  Feeling well overall, no new health issues reported  Trip to [facility] from4/2021 to 6/2021, trip went well overall  Not wearing his Guardian3 sensor past month, his sensor  broke and he's waiting on replacement            Diagnosis of diabetes mellitus age 14, living in [facility]  ... Initial treatment with insulin injections, using Humalog, NPH, and possibly Lantus insulins  2001. Switched to insulin pump management, Medtronic pump  Has used Medtronic CGMS years ago  Previously used the Medtronic 523 pump  Fall 2018. Tried Fiasp insulin, didn't notice much difference, stopped it.. Then back to Novolog  8/2018. Upgraded to Medtronic 670G with Guardian3 sensor              Novolog in pump     Now using the [employer] Contour Link meter, tests 4x/day  Exercise:  Walking              Carries raisins during walks, usual basal settings     5/7/18. Evaluation with [facility] CDE ([facility])  Current pump settings:       Recent pump Carelink data:         Previous [facility] labs include:  Lab Results   Component Value Date    A1C 7.3 (H) 01/25/2021     01/25/2021    POTASSIUM 4.2 01/25/2021    CHLORIDE 108 01/25/2021    CO2 29 01/25/2021    ANIONGAP 1 (L) 01/25/2021     (H) 01/25/2021    BUN 23 01/25/2021    CR 1.15 01/25/2021    GFRESTIMATED 72 01/25/2021    GFRESTBLACK 84 01/25/2021    JOCE 8.7 01/25/2021    CHOL 163 10/01/2020    TRIG 70 10/01/2020    HDL 47 10/01/2020     (H) 10/01/2020    NHDL 116 10/01/2020    UCRR 206 05/21/2019    MICROL 7 05/21/2019    UMALCR 3.61 05/21/2019     Last eye exam 10/2020 at [facility], no active DR noted  DM Complications:  none        ,  2-children.    Previously worked for [employer] in [employer] office, now retired  Previous gen me evaluations at [facility]          ROS: 10 point ROS neg other than the symptoms noted above in the HPI.     GENERAL: energy good; weight stable; denies fevers, chills, night sweats.   HEENT: no dysphagia, odonophagia, diplopia, neck pain  THYROID:  no apparent hyper or hypothyroid symptoms  CV: no chest pain, pressure, palpitations  LUNGS: no SOB, VARGAS, cough, wheezing   ABDOMEN: right mid abdomen pain with running; no diarrhea, constipation, abdominal pain  EXTREMITIES: mild finger tremors; no rashes, ulcers, edema  NEUROLOGY: no headaches, denies changes in vision, tingling, extremitiy numbness   MSK: no muscle aches or pains, weakness  SKIN: no rashes or lesions  : good erection function, denies nocturia  PSYCH:  stable mood, no significant anxiety or depression  ENDOCRINE: no heat or cold intolerance     Physical Exam   VS: /66   Pulse 59   Wt 92.3 kg (203 lb 6.4 oz)   SpO2 98%   BMI 29.18 kg/m    GENERAL: AXOX3, NAD, well dressed, answering questions appropriately, appears stated age.  ENT: no nose swelling or nasal discharge, mouth redness or gum changes.  EYES: eyes grossly normal to inspection, conjunctivae and sclerae normal, no exophthalmos or proptosis  THYROID:  no apparent nodules or goiter  CV:  RRR without murmurs  LUNGS: CTA posteriorly  ABDOMEN: abdomen normal size  EXTREMITIES: subtle 5th finger tremors; no lower edema noted  NEUROLOGY: CN grossly intact, no tremors  MSK: grossly intact  SKIN:  Small area of black discoloration at great toenails; no apparent skin lesions, rash, or edema with visualized skin appearance  PSYCH: mentation appears normal, affect normal/bright, judgement and insight intact,   normal speech and appearance well groomed    LABS:     All pertinent notes, labs, and images personally reviewed by me.      A/P:          Encounter Diagnoses   Name        Type 1 diabetes mellitus without complication (H)       Insulin pump status        Comments:  Reviewed health history and diabetes issues.  We discussed issues with getting out of auto-mode with Guardian3 sensor and pump  Reviewed and interpreted tests that I previously ordered.   Ordered appropriate tests for the endocrinology disease management.    Management options discussed and implemented after shared medical decision making with the patient.  T1DM problem is chronic-stable, controlled    Plan:  Reviewed the overall T1DM management and insulin pump use.  Discussed optimal BG testing to assess glucose trends.  We reviewed insulin pump settings, basal rate and bolus dosing  Use of automated pump bolus dosing for meal/snack carb & correction dosing  Reviewed the Medtronic Carelink report data today     Recommend:  Continue the current Medtronic insulin pump and diabetes management plan.  Pump setting changes:   Consider bolus ICR 5p 9.0 to 8.8    Resume Guardian3 sensor use soon when  available, use the pump in auto-mode as often as possible  Contact our office if new issues after restarting the Guardian3 sensor and auto mode   Continue the Humalog or consider trying the new fast acting Humalog insulin, discussed previously  Check lab tests today   Discussed option of nearby [facility] clinic   Lab orders placed  Consider another  CDE appt to review the pump and sensor, calibration issues  Check TSH level to screen for hyperthyroidism with his subtle finger tremors  Arrange annual dilated eye exam, fasting lipid panel testing.     Keep regular followup appointments with PCP also  Addressed patient's questions today.    There are no Patient Instructions on file for this visit.    Future labs ordered today:   Orders Placed This Encounter   Procedures     Hemoglobin A1c     Basic metabolic panel  (Ca, Cl, CO2, Creat, Gluc, K, Na, BUN)     **TSH with free T4 reflex FUTURE 2mo     Radiology/Consults  ordered today: None    Total time spent in with the patient evaluation:  22 min  Additional time spent reviewing pertinent lab tests and chart notes, and documentation: 8 min    Follow-up:  10/2021    VERONICA Escalona MD, MS  Endocrinology  [facility]                  Again, thank you for allowing me to participate in the care of your patient.        Sincerely,        Julien Escalona MD

## 2021-07-19 ENCOUNTER — LAB (OUTPATIENT)
Dept: LAB | Facility: CLINIC | Age: 51
End: 2021-07-19
Payer: COMMERCIAL

## 2021-07-19 DIAGNOSIS — Z96.41 INSULIN PUMP STATUS: ICD-10-CM

## 2021-07-19 DIAGNOSIS — E10.9 TYPE 1 DIABETES MELLITUS WITHOUT COMPLICATION (H): ICD-10-CM

## 2021-07-19 LAB — HBA1C MFR BLD: 7.3 % (ref 0–5.6)

## 2021-07-19 PROCEDURE — 80048 BASIC METABOLIC PNL TOTAL CA: CPT

## 2021-07-19 PROCEDURE — 36415 COLL VENOUS BLD VENIPUNCTURE: CPT

## 2021-07-19 PROCEDURE — 84443 ASSAY THYROID STIM HORMONE: CPT

## 2021-07-19 PROCEDURE — 83036 HEMOGLOBIN GLYCOSYLATED A1C: CPT

## 2021-07-20 LAB
ANION GAP SERPL CALCULATED.3IONS-SCNC: 8 MMOL/L (ref 3–14)
BUN SERPL-MCNC: 25 MG/DL (ref 7–30)
CALCIUM SERPL-MCNC: 8.9 MG/DL (ref 8.5–10.1)
CHLORIDE BLD-SCNC: 106 MMOL/L (ref 94–109)
CO2 SERPL-SCNC: 24 MMOL/L (ref 20–32)
CREAT SERPL-MCNC: 1.17 MG/DL (ref 0.66–1.25)
GFR SERPL CREATININE-BSD FRML MDRD: 71 ML/MIN/1.73M2
GLUCOSE BLD-MCNC: 149 MG/DL (ref 70–99)
POTASSIUM BLD-SCNC: 4.4 MMOL/L (ref 3.4–5.3)
SODIUM SERPL-SCNC: 138 MMOL/L (ref 133–144)

## 2021-07-22 LAB — TSH SERPL DL<=0.005 MIU/L-ACNC: 2.86 MU/L (ref 0.4–4)

## 2021-08-28 DIAGNOSIS — Z96.41 INSULIN PUMP STATUS: ICD-10-CM

## 2021-08-28 DIAGNOSIS — E10.9 TYPE 1 DIABETES MELLITUS WITHOUT COMPLICATION (H): ICD-10-CM

## 2021-08-30 RX ORDER — INFUSION SET FOR INSULIN PUMP
INFUSION SETS-PARAPHERNALIA MISCELLANEOUS
Qty: 40 EACH | Refills: 3 | Status: SHIPPED | OUTPATIENT
Start: 2021-08-30 | End: 2022-10-10

## 2021-08-30 RX ORDER — INSULIN PUMP SYRINGE, 1.8 ML
EACH MISCELLANEOUS
Qty: 40 EACH | Refills: 3 | Status: SHIPPED | OUTPATIENT
Start: 2021-08-30 | End: 2021-09-20

## 2021-09-18 ENCOUNTER — HEALTH MAINTENANCE LETTER (OUTPATIENT)
Age: 51
End: 2021-09-18

## 2021-09-20 ENCOUNTER — TELEPHONE (OUTPATIENT)
Dept: ENDOCRINOLOGY | Facility: CLINIC | Age: 51
End: 2021-09-20

## 2021-09-20 DIAGNOSIS — E10.9 TYPE 1 DIABETES MELLITUS WITHOUT COMPLICATION (H): ICD-10-CM

## 2021-09-20 DIAGNOSIS — Z96.41 INSULIN PUMP STATUS: ICD-10-CM

## 2021-09-20 RX ORDER — INSULIN PUMP SYRINGE, 1.8 ML
1 EACH MISCELLANEOUS
Qty: 40 EACH | Refills: 3 | Status: SHIPPED | OUTPATIENT
Start: 2021-09-20 | End: 2022-10-10

## 2021-09-20 NOTE — TELEPHONE ENCOUNTER
[facility] Call Center    Phone Message    May a detailed message be left on voicemail: yes     Reason for Call: Medication Question or concern regarding medication   Prescription Clarification  Name of Medication: Pump  Prescribing Provider:    Pharmacy: [facility]    What on the order needs clarification?   Patient called and spoke with writer, he states he will also need a pump rx sent in. Please call patient to discuss further.               Action Taken: Endo     Travel Screening: Not Applicable

## 2021-10-14 ENCOUNTER — OFFICE VISIT (OUTPATIENT)
Dept: ENDOCRINOLOGY | Facility: CLINIC | Age: 51
End: 2021-10-14
Payer: COMMERCIAL

## 2021-10-14 VITALS
DIASTOLIC BLOOD PRESSURE: 83 MMHG | WEIGHT: 205.8 LBS | HEART RATE: 66 BPM | BODY MASS INDEX: 29.53 KG/M2 | SYSTOLIC BLOOD PRESSURE: 151 MMHG

## 2021-10-14 DIAGNOSIS — Z96.41 INSULIN PUMP STATUS: ICD-10-CM

## 2021-10-14 DIAGNOSIS — E10.3393 TYPE 1 DIABETES MELLITUS WITH MODERATE NONPROLIFERATIVE RETINOPATHY OF BOTH EYES WITHOUT MACULAR EDEMA (H): Primary | ICD-10-CM

## 2021-10-14 PROCEDURE — 99214 OFFICE O/P EST MOD 30 MIN: CPT | Performed by: INTERNAL MEDICINE

## 2021-10-14 PROCEDURE — 95251 CONT GLUC MNTR ANALYSIS I&R: CPT | Performed by: INTERNAL MEDICINE

## 2021-10-14 NOTE — LETTER
10/14/2021         RE: Vinicius Jerome  [address] [address] [address]        Dear Colleague,    Thank you for referring your patient, Vinicius Jerome, to the [facility] [place]. Please see a copy of my visit note below.      Recent issues:  Diabetes follow-up evaluation  Feeling well overall, no new health issues reported  He expects getting a replacement Medtronic 670G pump soon, likely due to Medtronic concerns of reservoir cap area           Diagnosis of diabetes mellitus age 14, living in [facility]  ... Initial treatment with insulin injections, using Humalog, NPH, and possibly Lantus insulins  2001. Switched to insulin pump management, Medtronic pump  Has used Medtronic CGMS years ago  Previously used the Medtronic 523 pump  Fall 2018. Tried Fiasp insulin, didn't notice much difference, stopped it.. Then back to Novolog  8/2018. Upgraded to Medtronic 670G with Guardian3 sensor              Novolog in pump     Now using the [employer] Contour Link meter, tests 4x/day  Exercise:  Walking              Carries raisins during walks, usual basal settings     5/7/18. Evaluation with [facility] CDE ([facility])  Current pump settings:       Recent pump Carelink data:          Previous [facility] labs include:  Lab Results   Component Value Date    A1C 7.3 (H) 07/19/2021     07/19/2021    POTASSIUM 4.4 07/19/2021    CHLORIDE 106 07/19/2021    CO2 24 07/19/2021    ANIONGAP 8 07/19/2021     (H) 07/19/2021    BUN 25 07/19/2021    CR 1.17 07/19/2021    GFRESTIMATED 71 07/19/2021    GFRESTBLACK 84 01/25/2021    JOCE 8.9 07/19/2021    CHOL 163 10/01/2020    TRIG 70 10/01/2020    HDL 47 10/01/2020     (H) 10/01/2020    NHDL 116 10/01/2020    UCRR 206 05/21/2019    MICROL 7 05/21/2019    UMALCR 3.61 05/21/2019     Last eye exam 10/2020 at [facility], no active DR noted  DM Complications:  none        , 2-children.    Previously worked for [employer] in [employer], [address] office, now retired  Previous gen me evaluations at [facility]        PMH/PSH:  Past Medical History:   Diagnosis Date     Congenital hypertrophic pyloric stenosis     surgically corrected as a child     Hemorrhoids      History of diabetic retinopathy      Insulin pump status      Type I (juvenile type) diabetes mellitus without mention of complication, not stated as uncontrolled      Past Surgical History:   Procedure Laterality Date     C INCISION OF PYLORIC MUSCLE       C INSERT SUBCUT RESERV/PUMP/DEV       COLONOSCOPY N/A 8/13/2018    Procedure: COLONOSCOPY;  COLONOSCOPY;  Surgeon: Cruz Momin MD;  Location: [facility]     FINGER SURGERY         Family Hx:  Family History   Problem Relation Age of Onset     Cardiovascular Father         brain aneurysm- fully recovered     Cancer Mother         brain tumor - left her paralized on 1 side     Cerebrovascular Disease Maternal Grandmother      Heart Disease Paternal Grandfather      Heart Disease Paternal Grandmother          Social Hx:  Social History     Socioeconomic History     Marital status:      Spouse name: Not on file     Number of children: 2     Years of education: Not on file     Highest education level: Not on file   Occupational History     Not on file   Tobacco Use     Smoking status: Never Smoker     Smokeless tobacco: Never Used   Substance and Sexual Activity     Alcohol use: No     Drug use: No     Sexual activity: Yes     Partners: Female   Other Topics Concern     Parent/sibling w/ CABG, MI or angioplasty before 65F 55M? No   Social History Narrative     Not on file     Social Determinants of Health     Financial Resource Strain:      Difficulty of Paying Living Expenses:    Food Insecurity:      Worried About Running Out of Food in the Last Year:      Ran Out of Food in the Last Year:    Transportation Needs:      Lack of Transportation (Medical):      Lack of Transportation (Non-Medical):    Physical Activity:      Days of  "Exercise per Week:      Minutes of Exercise per Session:    Stress:      Feeling of Stress :    Social Connections:      Frequency of Communication with Friends and Family:      Frequency of Social Gatherings with Friends and Family:      Attends Mosque Services:      Active Member of Clubs or Organizations:      Attends Club or Organization Meetings:      Marital Status:    Intimate Partner Violence:      Fear of Current or Ex-Partner:      Emotionally Abused:      Physically Abused:      Sexually Abused:           MEDICATIONS:  has a current medication list which includes the following prescription(s): aspirin, cetirizine hcl, minimed guardian sensor 3, insulin lispro, multi vitamin  mens, acetone urine, blood glucose, contour next test, insulin glargine, insulin infusion pump, paradigm pump reservoir 1.8ml, silhouette 43\" infusion set, and LIPID LOWERING THERAPY NOT PRESCRIBED, INTENTIONAL,.        ROS: 10 point ROS neg other than the symptoms noted above in the HPI.     GENERAL: energy good; weight stable; denies fevers, chills, night sweats.   HEENT: no dysphagia, odonophagia, diplopia, neck pain  THYROID:  no apparent hyper or hypothyroid symptoms  CV: no chest pain, pressure, palpitations  LUNGS: no SOB, VARGAS, cough, wheezing   ABDOMEN: right mid abdomen pain with running; no diarrhea, constipation, abdominal pain  EXTREMITIES: mild finger tremors; no rashes, ulcers, edema  NEUROLOGY: no headaches, denies changes in vision, tingling, extremitiy numbness   MSK: no muscle aches or pains, weakness  SKIN: no rashes or lesions  : good erection function, denies nocturia  PSYCH:  stable mood, no significant anxiety or depression  ENDOCRINE: no heat or cold intolerance     Physical Exam   VS: BP (!) 151/83   Pulse 66   Wt 93.4 kg (205 lb 12.8 oz)   BMI 29.53 kg/m      Recheck 128/74  GENERAL: AXOX3, NAD, well dressed, answering questions appropriately, appears stated age.  ENT: no nose swelling or nasal " discharge, mouth redness or gum changes.  EYES: eyes grossly normal to inspection, conjunctivae and sclerae normal, no exophthalmos or proptosis  THYROID:  no apparent nodules or goiter  CV:  RRR without murmurs  LUNGS: CTA posteriorly  ABDOMEN: abdomen normal size  EXTREMITIES: subtle 5th finger tremors; no lower edema noted  NEUROLOGY: CN grossly intact, no tremors  MSK: grossly intact  SKIN:  Small area of black discoloration at great toenails; no apparent skin lesions, rash, or edema with visualized skin appearance  PSYCH: mentation appears normal, affect normal/bright, judgement and insight intact,   normal speech and appearance well groomed    LABS:     All pertinent notes, labs, and images personally reviewed by me.      A/P:  Encounter Diagnoses   Name Primary?     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  We discussed issues with getting out of auto-mode with Guardian3 sensor and pump  Reviewed and interpreted tests that I previously ordered.   Ordered appropriate tests for the endocrinology disease management.    Management options discussed and implemented after shared medical decision making with the patient.  T1DM problem is chronic-stable, controlled    Plan:  Reviewed the overall T1DM management and insulin pump use.  Discussed optimal BG testing to assess glucose trends.  We reviewed insulin pump settings, basal rate and bolus dosing  Use of automated pump bolus dosing for meal/snack carb & correction dosing  Reviewed the Medtronic Carelink report and recent Guardian3 CGM glucose trends, in detail       Recommend:  Continue the current Medtronic insulin pump and diabetes management plan.  Pump setting changes:   Consider bolus ICR 9a as 1030a 12 to 11, 5p 8.8 to 8.0    Resume Guardian3 sensor use soon when  available, use the pump in  auto-mode as often as possible  Contact our office if new issues after restarting the Guardian3 sensor and auto mode   Continue the Humalog or consider trying the new fast-acting Humalog (Lumgev) insulin, discussed previously  Plan fasting labs soon   Discussed option of nearby [facility] or [facility]   Lab orders placed    Keep focus on diet, exercise, and weight management  Arrange annual dilated eye exam, fasting lipid panel testing.     Keep regular followup appointments with PCP also  Addressed patient's questions today.    There are no Patient Instructions on file for this visit.    Future labs ordered today:   Orders Placed This Encounter   Procedures     GLUCOSE MONITOR, 72 HOUR, PHYS INTERP     Basic metabolic panel     Hemoglobin A1c     Lipid panel reflex to direct LDL Fasting     Albumin Random Urine Quantitative with Creat Ratio     TSH     Radiology/Consults ordered today: None    Total time spent in with the patient evaluation:  20 min  Additional time spent reviewing pertinent lab tests and chart notes, and documentation:  4 min    Follow-up:  Late 1/2022    VERONICA Escalona MD, MS  Endocrinology  [facility]                    Again, thank you for allowing me to participate in the care of your patient.        Sincerely,        Julien Escalona MD

## 2021-10-14 NOTE — PROGRESS NOTES
Recent issues:  Diabetes follow-up evaluation  Feeling well overall, no new health issues reported  He expects getting a replacement Medtronic 670G pump soon, likely due to Medtronic concerns of reservoir cap area           Diagnosis of diabetes mellitus age 14, living in [facility]  ... Initial treatment with insulin injections, using Humalog, NPH, and possibly Lantus insulins  2001. Switched to insulin pump management, Medtronic pump  Has used Medtronic CGMS years ago  Previously used the Medtronic 523 pump  Fall 2018. Tried Fiasp insulin, didn't notice much difference, stopped it.. Then back to Novolog  8/2018. Upgraded to Medtronic 670G with Guardian3 sensor              Novolog in pump     Now using the [employer] app Contour Link meter, tests 4x/day  Exercise:  Walking              Carries raisins during walks, usual basal settings     5/7/18. Evaluation with  CDE ([facility])  Current pump settings:       Recent pump Carelink data:          Previous  labs include:  Lab Results   Component Value Date    A1C 7.3 (H) 07/19/2021     07/19/2021    POTASSIUM 4.4 07/19/2021    CHLORIDE 106 07/19/2021    CO2 24 07/19/2021    ANIONGAP 8 07/19/2021     (H) 07/19/2021    BUN 25 07/19/2021    CR 1.17 07/19/2021    GFRESTIMATED 71 07/19/2021    GFRESTBLACK 84 01/25/2021    JOCE 8.9 07/19/2021    CHOL 163 10/01/2020    TRIG 70 10/01/2020    HDL 47 10/01/2020     (H) 10/01/2020    NHDL 116 10/01/2020    UCRR 206 05/21/2019    MICROL 7 05/21/2019    UMALCR 3.61 05/21/2019     Last eye exam 10/2020 at [facility], no active DR noted  DM Complications:  none        , 2-children.    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, [employer] office, now retired  Previous gen me evaluations at [facility]        PMH/PSH:  Past Medical History:   Diagnosis Date     Congenital hypertrophic pyloric stenosis     surgically corrected as a child     Hemorrhoids      History of diabetic  retinopathy      Insulin pump status      Type I (juvenile type) diabetes mellitus without mention of complication, not stated as uncontrolled      Past Surgical History:   Procedure Laterality Date     C INCISION OF PYLORIC MUSCLE       C INSERT SUBCUT RESERV/PUMP/DEV       COLONOSCOPY N/A 8/13/2018    Procedure: COLONOSCOPY;  COLONOSCOPY;  Surgeon: Cruz Momin MD;  Location: [facility]     FINGER SURGERY         Family Hx:  Family History   Problem Relation Age of Onset     Cardiovascular Father         brain aneurysm- fully recovered     Cancer Mother         brain tumor - left her paralized on 1 side     Cerebrovascular Disease Maternal Grandmother      Heart Disease Paternal Grandfather      Heart Disease Paternal Grandmother          Social Hx:  Social History     Socioeconomic History     Marital status:      Spouse name: Not on file     Number of children: 2     Years of education: Not on file     Highest education level: Not on file   Occupational History     Not on file   Tobacco Use     Smoking status: Never Smoker     Smokeless tobacco: Never Used   Substance and Sexual Activity     Alcohol use: No     Drug use: No     Sexual activity: Yes     Partners: Female   Other Topics Concern     Parent/sibling w/ CABG, MI or angioplasty before 65F 55M? No   Social History Narrative     Not on file     Social Determinants of Health     Financial Resource Strain:      Difficulty of Paying Living Expenses:    Food Insecurity:      Worried About Running Out of Food in the Last Year:      Ran Out of Food in the Last Year:    Transportation Needs:      Lack of Transportation (Medical):      Lack of Transportation (Non-Medical):    Physical Activity:      Days of Exercise per Week:      Minutes of Exercise per Session:    Stress:      Feeling of Stress :    Social Connections:      Frequency of Communication with Friends and Family:      Frequency of Social Gatherings with Friends and Family:      Attends [employer]  "Services:      Active Member of Clubs or Organizations:      Attends Club or Organization Meetings:      Marital Status:    Intimate Partner Violence:      Fear of Current or Ex-Partner:      Emotionally Abused:      Physically Abused:      Sexually Abused:           MEDICATIONS:  has a current medication list which includes the following prescription(s): aspirin, cetirizine hcl, minimed guardian sensor 3, insulin lispro, multi vitamin  mens, acetone urine, blood glucose, contour next test, insulin glargine, insulin infusion pump, paradigm pump reservoir 1.8ml, silhouette 43\" infusion set, and LIPID LOWERING THERAPY NOT PRESCRIBED, INTENTIONAL,.        ROS: 10 point ROS neg other than the symptoms noted above in the HPI.     GENERAL: energy good; weight stable; denies fevers, chills, night sweats.   HEENT: no dysphagia, odonophagia, diplopia, neck pain  THYROID:  no apparent hyper or hypothyroid symptoms  CV: no chest pain, pressure, palpitations  LUNGS: no SOB, VARGAS, cough, wheezing   ABDOMEN: right mid abdomen pain with running; no diarrhea, constipation, abdominal pain  EXTREMITIES: mild finger tremors; no rashes, ulcers, edema  NEUROLOGY: no headaches, denies changes in vision, tingling, extremitiy numbness   MSK: no muscle aches or pains, weakness  SKIN: no rashes or lesions  : good erection function, denies nocturia  PSYCH:  stable mood, no significant anxiety or depression  ENDOCRINE: no heat or cold intolerance     Physical Exam   VS: BP (!) 151/83   Pulse 66   Wt 93.4 kg (205 lb 12.8 oz)   BMI 29.53 kg/m      Recheck 128/74  GENERAL: AXOX3, NAD, well dressed, answering questions appropriately, appears stated age.  ENT: no nose swelling or nasal discharge, mouth redness or gum changes.  EYES: eyes grossly normal to inspection, conjunctivae and sclerae normal, no exophthalmos or proptosis  THYROID:  no apparent nodules or goiter  CV:  RRR without murmurs  LUNGS: CTA posteriorly  ABDOMEN: abdomen normal " size  EXTREMITIES: subtle 5th finger tremors; no lower edema noted  NEUROLOGY: CN grossly intact, no tremors  MSK: grossly intact  SKIN:  Small area of black discoloration at great toenails; no apparent skin lesions, rash, or edema with visualized skin appearance  PSYCH: mentation appears normal, affect normal/bright, judgement and insight intact,   normal speech and appearance well groomed    LABS:     All pertinent notes, labs, and images personally reviewed by me.      A/P:  Encounter Diagnoses   Name Primary?     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  We discussed issues with getting out of auto-mode with Guardian3 sensor and pump  Reviewed and interpreted tests that I previously ordered.   Ordered appropriate tests for the endocrinology disease management.    Management options discussed and implemented after shared medical decision making with the patient.  T1DM problem is chronic-stable, controlled    Plan:  Reviewed the overall T1DM management and insulin pump use.  Discussed optimal BG testing to assess glucose trends.  We reviewed insulin pump settings, basal rate and bolus dosing  Use of automated pump bolus dosing for meal/snack carb & correction dosing  Reviewed the Medtronic Carelink report and recent Guardian3 CGM glucose trends, in detail       Recommend:  Continue the current Medtronic insulin pump and diabetes management plan.  Pump setting changes:   Consider bolus ICR 9a as 1030a 12 to 11, 5p 8.8 to 8.0    Resume Guardian3 sensor use soon when  available, use the pump in auto-mode as often as possible  Contact our office if new issues after restarting the Guardian3 sensor and auto mode   Continue the Humalog or consider trying the new fast-acting Humalog (Lumgev) insulin, discussed previously  Plan fasting labs soon   Discussed  option of nearby [facility] or [facility]   Lab orders placed    Keep focus on diet, exercise, and weight management  Arrange annual dilated eye exam, fasting lipid panel testing.     Keep regular followup appointments with PCP also  Addressed patient's questions today.    There are no Patient Instructions on file for this visit.    Future labs ordered today:   Orders Placed This Encounter   Procedures     GLUCOSE MONITOR, 72 HOUR, PHYS INTERP     Basic metabolic panel     Hemoglobin A1c     Lipid panel reflex to direct LDL Fasting     Albumin Random Urine Quantitative with Creat Ratio     TSH     Radiology/Consults ordered today: None    Total time spent in with the patient evaluation:  20 min  Additional time spent reviewing pertinent lab tests and chart notes, and documentation:  4 min    Follow-up:  Late 1/2022    VERONICA Escalona MD, MS  Endocrinology  [facility]

## 2021-10-15 ENCOUNTER — LAB (OUTPATIENT)
Dept: LAB | Facility: CLINIC | Age: 51
End: 2021-10-15

## 2021-10-15 DIAGNOSIS — E10.3393 TYPE 1 DIABETES MELLITUS WITH MODERATE NONPROLIFERATIVE RETINOPATHY OF BOTH EYES WITHOUT MACULAR EDEMA (H): ICD-10-CM

## 2021-10-15 LAB
ANION GAP SERPL CALCULATED.3IONS-SCNC: 2 MMOL/L (ref 3–14)
BUN SERPL-MCNC: 26 MG/DL (ref 7–30)
CALCIUM SERPL-MCNC: 9 MG/DL (ref 8.5–10.1)
CHLORIDE BLD-SCNC: 102 MMOL/L (ref 94–109)
CHOLEST SERPL-MCNC: 174 MG/DL
CO2 SERPL-SCNC: 29 MMOL/L (ref 20–32)
CREAT SERPL-MCNC: 1.14 MG/DL (ref 0.66–1.25)
CREAT UR-MCNC: 141 MG/DL
FASTING STATUS PATIENT QL REPORTED: YES
GFR SERPL CREATININE-BSD FRML MDRD: 73 ML/MIN/1.73M2
GLUCOSE BLD-MCNC: 311 MG/DL (ref 70–99)
HBA1C MFR BLD: 7.2 % (ref 0–5.6)
HDLC SERPL-MCNC: 47 MG/DL
LDLC SERPL CALC-MCNC: 112 MG/DL
MICROALBUMIN UR-MCNC: 8 MG/L
MICROALBUMIN/CREAT UR: 5.67 MG/G CR (ref 0–17)
NONHDLC SERPL-MCNC: 127 MG/DL
POTASSIUM BLD-SCNC: 4.2 MMOL/L (ref 3.4–5.3)
SODIUM SERPL-SCNC: 133 MMOL/L (ref 133–144)
TRIGL SERPL-MCNC: 73 MG/DL
TSH SERPL DL<=0.005 MIU/L-ACNC: 3.19 MU/L (ref 0.4–4)

## 2021-10-15 PROCEDURE — 80061 LIPID PANEL: CPT

## 2021-10-15 PROCEDURE — 84443 ASSAY THYROID STIM HORMONE: CPT

## 2021-10-15 PROCEDURE — 83036 HEMOGLOBIN GLYCOSYLATED A1C: CPT

## 2021-10-15 PROCEDURE — 80048 BASIC METABOLIC PNL TOTAL CA: CPT

## 2021-10-15 PROCEDURE — 82043 UR ALBUMIN QUANTITATIVE: CPT

## 2021-10-15 PROCEDURE — 36415 COLL VENOUS BLD VENIPUNCTURE: CPT

## 2021-10-25 DIAGNOSIS — Z96.41 INSULIN PUMP STATUS: ICD-10-CM

## 2021-10-25 DIAGNOSIS — E10.9 TYPE 1 DIABETES MELLITUS WITHOUT COMPLICATION (H): ICD-10-CM

## 2021-12-06 ENCOUNTER — TRANSFERRED RECORDS (OUTPATIENT)
Dept: HEALTH INFORMATION MANAGEMENT | Facility: CLINIC | Age: 51
End: 2021-12-06
Payer: COMMERCIAL

## 2021-12-06 LAB — RETINOPATHY: NEGATIVE

## 2022-01-24 ENCOUNTER — OFFICE VISIT (OUTPATIENT)
Dept: ENDOCRINOLOGY | Facility: CLINIC | Age: 52
End: 2022-01-24
Payer: COMMERCIAL

## 2022-01-24 VITALS
BODY MASS INDEX: 29.29 KG/M2 | SYSTOLIC BLOOD PRESSURE: 138 MMHG | DIASTOLIC BLOOD PRESSURE: 87 MMHG | WEIGHT: 204.1 LBS | HEART RATE: 60 BPM

## 2022-01-24 DIAGNOSIS — Z96.41 INSULIN PUMP STATUS: ICD-10-CM

## 2022-01-24 DIAGNOSIS — E10.9 TYPE 1 DIABETES MELLITUS WITHOUT COMPLICATION (H): Primary | ICD-10-CM

## 2022-01-24 DIAGNOSIS — Z86.39 HISTORY OF DIABETIC RETINOPATHY: ICD-10-CM

## 2022-01-24 PROCEDURE — 99214 OFFICE O/P EST MOD 30 MIN: CPT | Performed by: INTERNAL MEDICINE

## 2022-01-24 RX ORDER — BLOOD-GLUCOSE SENSOR
1 EACH MISCELLANEOUS CONTINUOUS PRN
Qty: 10 EACH | Refills: 3 | Status: SHIPPED | OUTPATIENT
Start: 2022-01-24 | End: 2023-03-12

## 2022-01-24 NOTE — LETTER
1/24/2022         RE: Vinicius Jerome  [address] [id number]        Dear Colleague,    Thank you for referring your patient, Vinicius Jerome, to the [facility] [place]. Please see a copy of my visit note below.      Recent issues:  Diabetes follow-up evaluation  Feeling well overall, no new health issues reported  Wearing his Guardian3 sensors regulary, ran out and wonders if [employer] covers these sensors           Diagnosis of diabetes mellitus age 14, living in [facility]  ... Initial treatment with insulin injections, using Humalog, NPH, and possibly Lantus insulins  2001. Switched to insulin pump management, Medtronic pump  Has used Medtronic CGM years ago  Previously used the Medtronic 523 pump  Fall 2018. Tried Fiasp insulin, didn't notice much difference, stopped it.. Then back to Novolog  8/2018. Upgraded to Medtronic 670G with Guardian3 sensor              Novolog in pump     Now using the [employer] Contour Link meter, tests 4x/day  Exercise:  Walking              Carries raisins during walks, usual basal settings     5/7/18. Evaluation with [facility] CDE ([facility])  Current pump settings:       Recent pump Carelink data:        Previous [facility] labs include:  Lab Results   Component Value Date    A1C 7.2 (H) 10/15/2021     10/15/2021    POTASSIUM 4.2 10/15/2021    CHLORIDE 102 10/15/2021    CO2 29 10/15/2021    ANIONGAP 2 (L) 10/15/2021     (H) 10/15/2021    BUN 26 10/15/2021    CR 1.14 10/15/2021    GFRESTIMATED 73 10/15/2021    GFRESTBLACK 84 01/25/2021    JOCE 9.0 10/15/2021    CHOL 174 10/15/2021    TRIG 73 10/15/2021    HDL 47 10/15/2021     (H) 10/15/2021    NHDL 127 10/15/2021    UCRR 141 10/15/2021    MICROL 8 10/15/2021    UMALCR 5.67 10/15/2021     Lab Results   Component Value Date    TSH 3.19 10/15/2021    T4 0.98 10/16/2007     Last eye exam 12/2021 at [facility], no active DR noted  DM Complications:  none        ,  2-children.    Previously worked for [employer] in [employer], [place] office, now retired  Previous gen me evaluations at [facility]        PMH/PSH:  Past Medical History:   Diagnosis Date     Congenital hypertrophic pyloric stenosis     surgically corrected as a child     Hemorrhoids      History of diabetic retinopathy      Insulin pump status      Type I (juvenile type) diabetes mellitus without mention of complication, not stated as uncontrolled      Past Surgical History:   Procedure Laterality Date     COLONOSCOPY N/A 8/13/2018    Procedure: COLONOSCOPY;  COLONOSCOPY;  Surgeon: Cruz Momin MD;  Location: [facility]     FINGER SURGERY       ZZC INCISION OF PYLORIC MUSCLE       ZZC INSERT SUBCUT RESERV/PUMP/DEV         Family Hx:  Family History   Problem Relation Age of Onset     Cardiovascular Father         brain aneurysm- fully recovered     Cancer Mother         brain tumor - left her paralized on 1 side     Cerebrovascular Disease Maternal Grandmother      Heart Disease Paternal Grandfather      Heart Disease Paternal Grandmother          Social Hx:  Social History     Socioeconomic History     Marital status:      Spouse name: Not on file     Number of children: 2     Years of education: Not on file     Highest education level: Not on file   Occupational History     Not on file   Tobacco Use     Smoking status: Never Smoker     Smokeless tobacco: Never Used   Substance and Sexual Activity     Alcohol use: No     Drug use: No     Sexual activity: Yes     Partners: Female   Other Topics Concern     Parent/sibling w/ CABG, MI or angioplasty before 65F 55M? No   Social History Narrative     Not on file     Social Determinants of Health     Financial Resource Strain: Not on file   Food Insecurity: Not on file   Transportation Needs: Not on file   Physical Activity: Not on file   Stress: Not on file   Social Connections: Not on file   Intimate Partner Violence: Not on file   Housing  "Stability: Not on file          MEDICATIONS:  has a current medication list which includes the following prescription(s): aspirin, guardian sensor (3), minimed guardian sensor 3, humalog, multi vitamin  mens, acetone urine, blood glucose, cetirizine hcl, contour next test, insulin glargine, insulin infusion pump, paradigm pump reservoir 1.8ml, silhouette 43\" infusion set, and LIPID LOWERING THERAPY NOT PRESCRIBED, INTENTIONAL,.        ROS: 10 point ROS neg other than the symptoms noted above in the HPI.     GENERAL: energy good; weight stable; denies fevers, chills, night sweats.   HEENT: no dysphagia, odonophagia, diplopia, neck pain  THYROID:  no apparent hyper or hypothyroid symptoms  CV: no chest pain, pressure, palpitations  LUNGS: no SOB, VARGAS, cough, wheezing   ABDOMEN: right mid abdomen pain with running; no diarrhea, constipation, abdominal pain  EXTREMITIES: mild finger tremors; no rashes, ulcers, edema  NEUROLOGY: no headaches, denies changes in vision, tingling, extremitiy numbness   MSK: no muscle aches or pains, weakness  SKIN: no rashes or lesions  : good erection function, denies nocturia  PSYCH:  stable mood, no significant anxiety or depression  ENDOCRINE: no heat or cold intolerance     Physical Exam   VS: /87   Pulse 60   Wt 92.6 kg (204 lb 1.6 oz)   BMI 29.29 kg/m     GENERAL: AXOX3, NAD, well dressed, answering questions appropriately, appears stated age.  ENT: no nose swelling or nasal discharge, mouth redness or gum changes.  EYES: eyes grossly normal to inspection, conjunctivae and sclerae normal, no exophthalmos or proptosis  THYROID:  no apparent nodules or goiter  LUNGS: CTA posteriorly  ABDOMEN: abdomen normal size  EXTREMITIES: feet pulses R/L DP 4/4; no lower edema noted  NEUROLOGY: CN grossly intact, no tremors  MSK: grossly intact  SKIN:  Irregular toenails; no apparent skin lesions, rash, or edema with visualized skin appearance  PSYCH: mentation appears normal, affect " normal/bright, judgement and insight intact,   normal speech and appearance well groomed    LABS:     All pertinent notes, labs, and images personally reviewed by me.      A/P:  Encounter Diagnoses   Name Primary?     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  Higher postmeal glucose trends, not using pump in auto mode recently  Reviewed and interpreted tests that I previously ordered.   Ordered appropriate tests for the endocrinology disease management.    Management options discussed and implemented after shared medical decision making with the patient.  T1DM problem is chronic-stable, controlled    Plan:  Reviewed the overall T1DM management and insulin pump use.  Discussed optimal BG testing to assess glucose trends.  We reviewed insulin pump settings, basal rate and bolus dosing  Use of automated pump bolus dosing for meal/snack carb & correction dosing  Reviewed the Medtronic Carelink report and recent Guardian3 CGM glucose trends, in detail    Recommend:  Continue the current Medtronic insulin pump and diabetes management plan.  Pump setting changes:   Basals:  5a 0.425 to 0.375 unit(s)/hr   Bolus: 5a 8.0 to 7.6     Resume Guardian3 sensor use if possible   Unclear whether [employer] can process these Rx's   We decided to try [employer] to process this Rx, updated Rx sent today  Use Guardian3 sensor and pump in auto-mode, if possible   Continue the Humalog or consider trying the new fast-acting Humalog (Lumgev) insulin, discussed previously  Plan fasting labs in late 4/2022   Discussed option of nearby [facility] or [facility]   Lab orders placed  Keep focus on diet, exercise, and weight management  Arrange annual dilated eye exam, fasting lipid panel testing.     Keep regular followup appointments with PCP also  Addressed patient's questions  today.    There are no Patient Instructions on file for this visit.    Future labs ordered today:   Orders Placed This Encounter   Procedures     BASIC METABOLIC PANEL     ALT     Hemoglobin A1c     Lipid panel reflex to direct LDL Fasting     Radiology/Consults ordered today: None    Total time spent in with the patient evaluation:  25 min  Additional time spent reviewing pertinent lab tests and chart notes, and documentation:  8 min    Follow-up:  5/2022    VERONICA Escalona MD, MS  Endocrinology  [facility]                      Again, thank you for allowing me to participate in the care of your patient.        Sincerely,        Julien Escalona MD

## 2022-01-24 NOTE — PROGRESS NOTES
Recent issues:  Diabetes follow-up evaluation  Feeling well overall, no new health issues reported  Wearing his Guardian3 sensors regulary, ran out and wonders if [employer] covers these sensors           Diagnosis of diabetes mellitus age 14, living in [facility]  ... Initial treatment with insulin injections, using Humalog, NPH, and possibly Lantus insulins  2001. Switched to insulin pump management, Medtronic pump  Has used Medtronic CGM years ago  Previously used the Medtronic 523 pump  Fall 2018. Tried Fiasp insulin, didn't notice much difference, stopped it.. Then back to Novolog  8/2018. Upgraded to Medtronic 670G with Guardian3 sensor              Novolog in pump     Now using the [employer] Contour Link meter, tests 4x/day  Exercise:  Walking              Carries raisins during walks, usual basal settings     5/7/18. Evaluation with  CDE (DEJON)  Current pump settings:       Recent pump Carelink data:        Previous  labs include:  Lab Results   Component Value Date    A1C 7.2 (H) 10/15/2021     10/15/2021    POTASSIUM 4.2 10/15/2021    CHLORIDE 102 10/15/2021    CO2 29 10/15/2021    ANIONGAP 2 (L) 10/15/2021     (H) 10/15/2021    BUN 26 10/15/2021    CR 1.14 10/15/2021    GFRESTIMATED 73 10/15/2021    GFRESTBLACK 84 01/25/2021    JOCE 9.0 10/15/2021    CHOL 174 10/15/2021    TRIG 73 10/15/2021    HDL 47 10/15/2021     (H) 10/15/2021    NHDL 127 10/15/2021    UCRR 141 10/15/2021    MICROL 8 10/15/2021    UMALCR 5.67 10/15/2021     Lab Results   Component Value Date    TSH 3.19 10/15/2021    T4 0.98 10/16/2007     Last eye exam 12/2021 at [facility], no active DR noted  DM Complications:  none        , 2-children.    Previously worked for [email or web address] in [employer], [employer] office, now retired  Previous gen me evaluations at [facility]        PMH/PSH:  Past Medical History:   Diagnosis Date     Congenital hypertrophic pyloric stenosis     surgically  corrected as a child     Hemorrhoids      History of diabetic retinopathy      Insulin pump status      Type I (juvenile type) diabetes mellitus without mention of complication, not stated as uncontrolled      Past Surgical History:   Procedure Laterality Date     COLONOSCOPY N/A 8/13/2018    Procedure: COLONOSCOPY;  COLONOSCOPY;  Surgeon: Cruz Momin MD;  Location: [facility]     FINGER SURGERY       ZZC INCISION OF PYLORIC MUSCLE       ZZC INSERT SUBCUT RESERV/PUMP/DEV         Family Hx:  Family History   Problem Relation Age of Onset     Cardiovascular Father         brain aneurysm- fully recovered     Cancer Mother         brain tumor - left her paralized on 1 side     Cerebrovascular Disease Maternal Grandmother      Heart Disease Paternal Grandfather      Heart Disease Paternal Grandmother          Social Hx:  Social History     Socioeconomic History     Marital status:      Spouse name: Not on file     Number of children: 2     Years of education: Not on file     Highest education level: Not on file   Occupational History     Not on file   Tobacco Use     Smoking status: Never Smoker     Smokeless tobacco: Never Used   Substance and Sexual Activity     Alcohol use: No     Drug use: No     Sexual activity: Yes     Partners: Female   Other Topics Concern     Parent/sibling w/ CABG, MI or angioplasty before 65F 55M? No   Social History Narrative     Not on file     Social Determinants of Health     Financial Resource Strain: Not on file   Food Insecurity: Not on file   Transportation Needs: Not on file   Physical Activity: Not on file   Stress: Not on file   Social Connections: Not on file   Intimate Partner Violence: Not on file   Housing Stability: Not on file          MEDICATIONS:  has a current medication list which includes the following prescription(s): aspirin, guardian sensor (3), minimed guardian sensor 3, humalog, multi vitamin  mens, acetone urine, blood glucose, cetirizine hcl, contour next  "test, insulin glargine, insulin infusion pump, paradigm pump reservoir 1.8ml, silhouette 43\" infusion set, and LIPID LOWERING THERAPY NOT PRESCRIBED, INTENTIONAL,.        ROS: 10 point ROS neg other than the symptoms noted above in the HPI.     GENERAL: energy good; weight stable; denies fevers, chills, night sweats.   HEENT: no dysphagia, odonophagia, diplopia, neck pain  THYROID:  no apparent hyper or hypothyroid symptoms  CV: no chest pain, pressure, palpitations  LUNGS: no SOB, VARGAS, cough, wheezing   ABDOMEN: right mid abdomen pain with running; no diarrhea, constipation, abdominal pain  EXTREMITIES: mild finger tremors; no rashes, ulcers, edema  NEUROLOGY: no headaches, denies changes in vision, tingling, extremitiy numbness   MSK: no muscle aches or pains, weakness  SKIN: no rashes or lesions  : good erection function, denies nocturia  PSYCH:  stable mood, no significant anxiety or depression  ENDOCRINE: no heat or cold intolerance     Physical Exam   VS: /87   Pulse 60   Wt 92.6 kg (204 lb 1.6 oz)   BMI 29.29 kg/m     GENERAL: AXOX3, NAD, well dressed, answering questions appropriately, appears stated age.  ENT: no nose swelling or nasal discharge, mouth redness or gum changes.  EYES: eyes grossly normal to inspection, conjunctivae and sclerae normal, no exophthalmos or proptosis  THYROID:  no apparent nodules or goiter  LUNGS: CTA posteriorly  ABDOMEN: abdomen normal size  EXTREMITIES: feet pulses R/L DP 4/4; no lower edema noted  NEUROLOGY: CN grossly intact, no tremors  MSK: grossly intact  SKIN:  Irregular toenails; no apparent skin lesions, rash, or edema with visualized skin appearance  PSYCH: mentation appears normal, affect normal/bright, judgement and insight intact,   normal speech and appearance well groomed    LABS:     All pertinent notes, labs, and images personally reviewed by me.      A/P:  Encounter Diagnoses   Name Primary?     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  Higher postmeal glucose trends, not using pump in auto mode recently  Reviewed and interpreted tests that I previously ordered.   Ordered appropriate tests for the endocrinology disease management.    Management options discussed and implemented after shared medical decision making with the patient.  T1DM problem is chronic-stable, controlled    Plan:  Reviewed the overall T1DM management and insulin pump use.  Discussed optimal BG testing to assess glucose trends.  We reviewed insulin pump settings, basal rate and bolus dosing  Use of automated pump bolus dosing for meal/snack carb & correction dosing  Reviewed the Medtronic Carelink report and recent Guardian3 CGM glucose trends, in detail    Recommend:  Continue the current Medtronic insulin pump and diabetes management plan.  Pump setting changes:   Basals:  5a 0.425 to 0.375 unit(s)/hr   Bolus: 5a 8.0 to 7.6     Resume Guardian3 sensor use if possible   Unclear whether [employer] can process these Rx's   We decided to try imageloop to process this Rx, updated Rx sent today  Use Guardian3 sensor and pump in auto-mode, if possible   Continue the Humalog or consider trying the new fast-acting Humalog (Lumgev) insulin, discussed previously  Plan fasting labs in late 4/2022   Discussed option of nearby [facility] or  clinics   Lab orders placed  Keep focus on diet, exercise, and weight management  Arrange annual dilated eye exam, fasting lipid panel testing.     Keep regular followup appointments with PCP also  Addressed patient's questions today.    There are no Patient Instructions on file for this visit.    Future labs ordered today:   Orders Placed This Encounter   Procedures     BASIC METABOLIC PANEL     ALT     Hemoglobin A1c     Lipid panel reflex to direct LDL Fasting     Radiology/Consults  ordered today: None    Total time spent in with the patient evaluation:  25 min  Additional time spent reviewing pertinent lab tests and chart notes, and documentation:  8 min    Follow-up:  5/2022    VERONICA Escalona MD, MS  Endocrinology  [facility]

## 2022-03-03 ENCOUNTER — OFFICE VISIT (OUTPATIENT)
Dept: FAMILY MEDICINE | Facility: CLINIC | Age: 52
End: 2022-03-03
Payer: COMMERCIAL

## 2022-03-03 VITALS
OXYGEN SATURATION: 99 % | RESPIRATION RATE: 18 BRPM | HEART RATE: 66 BPM | WEIGHT: 202.8 LBS | HEIGHT: 70 IN | DIASTOLIC BLOOD PRESSURE: 72 MMHG | BODY MASS INDEX: 29.03 KG/M2 | SYSTOLIC BLOOD PRESSURE: 122 MMHG | TEMPERATURE: 98.3 F

## 2022-03-03 DIAGNOSIS — E10.3393 TYPE 1 DIABETES MELLITUS WITH MODERATE NONPROLIFERATIVE RETINOPATHY OF BOTH EYES WITHOUT MACULAR EDEMA (H): ICD-10-CM

## 2022-03-03 DIAGNOSIS — K29.70 GASTRITIS WITHOUT BLEEDING, UNSPECIFIED CHRONICITY, UNSPECIFIED GASTRITIS TYPE: Primary | ICD-10-CM

## 2022-03-03 PROCEDURE — 99203 OFFICE O/P NEW LOW 30 MIN: CPT | Performed by: FAMILY MEDICINE

## 2022-03-03 NOTE — PROGRESS NOTES
"  Assessment & Plan     Gastritis without bleeding, unspecified chronicity, unspecified gastritis type  - Discussed diet modification   Exam normal no tenderness on exam .  - will try PPI for short period .  - omeprazole (PRILOSEC) 20 MG DR capsule; Take 1 capsule (20 mg) by mouth daily    - recommend to contact if pain fails to improve or gets any worst .  Type 1 diabetes mellitus with moderate nonproliferative retinopathy of both eyes without macular edema (H)  - recommend to continue to follow with endocrinologist .       BMI:   Estimated body mass index is 29.1 kg/m  as calculated from the following:    Height as of this encounter: 1.778 m (5' 10\").    Weight as of this encounter: 92 kg (202 lb 12.8 oz).   Weight management plan: Discussed healthy diet and exercise guidelines        Return in about 6 months (around 9/3/2022) for Routine preventive, patient will call.    Amina Zimmer MD  [facility] JELLY Colvin is a 51 year old who presents for the following health issues     History of Present Illness     Reason for visit:  Tenderness and pain near belly button  Symptom onset:  3-4 weeks ago  Symptoms include:  Slight pain off and on  Symptom intensity:  Mild  Symptom progression:  Staying the same  Had these symptoms before:  No  What makes it worse:  Sitting  What makes it better:  Walking    He eats 2-3 servings of fruits and vegetables daily.He consumes 0 sweetened beverage(s) daily.He exercises with enough effort to increase his heart rate 30 to 60 minutes per day.  He exercises with enough effort to increase his heart rate 7 days per week.   He is taking medications regularly.           Review of Systems   Gastrointestinal: Negative for abdominal distention and abdominal pain.   Genitourinary: Negative for dysuria.   Musculoskeletal: Negative for back pain.   Neurological: Negative for headaches.   Psychiatric/Behavioral: Negative for agitation and behavioral problems.    " "        Objective    /72   Pulse 66   Temp 98.3  F (36.8  C) (Oral)   Resp 18   Ht 1.778 m (5' 10\")   Wt 92 kg (202 lb 12.8 oz)   SpO2 99%   BMI 29.10 kg/m    Body mass index is 29.1 kg/m .  Physical Exam  Cardiovascular:      Rate and Rhythm: Normal rate.   Pulmonary:      Effort: Pulmonary effort is normal.   Abdominal:      General: Abdomen is flat. There is no distension.      Palpations: Abdomen is soft.      Tenderness: There is no abdominal tenderness.   Skin:     General: Skin is warm.   Neurological:      General: No focal deficit present.   Psychiatric:         Mood and Affect: Mood normal.                "

## 2022-03-04 ASSESSMENT — ENCOUNTER SYMPTOMS
DYSURIA: 0
ABDOMINAL DISTENTION: 0
ABDOMINAL PAIN: 0
AGITATION: 0
BACK PAIN: 0
HEADACHES: 0

## 2022-04-26 ENCOUNTER — LAB (OUTPATIENT)
Dept: LAB | Facility: CLINIC | Age: 52
End: 2022-04-26
Payer: COMMERCIAL

## 2022-04-26 DIAGNOSIS — E10.9 TYPE 1 DIABETES MELLITUS WITHOUT COMPLICATION (H): ICD-10-CM

## 2022-04-26 LAB
ALT SERPL W P-5'-P-CCNC: 21 U/L (ref 0–70)
ANION GAP SERPL CALCULATED.3IONS-SCNC: 5 MMOL/L (ref 3–14)
BUN SERPL-MCNC: 23 MG/DL (ref 7–30)
CALCIUM SERPL-MCNC: 9.1 MG/DL (ref 8.5–10.1)
CHLORIDE BLD-SCNC: 105 MMOL/L (ref 94–109)
CHOLEST SERPL-MCNC: 177 MG/DL
CO2 SERPL-SCNC: 28 MMOL/L (ref 20–32)
CREAT SERPL-MCNC: 1.21 MG/DL (ref 0.66–1.25)
FASTING STATUS PATIENT QL REPORTED: YES
GFR SERPL CREATININE-BSD FRML MDRD: 71 ML/MIN/1.73M2
GLUCOSE BLD-MCNC: 167 MG/DL (ref 70–99)
HBA1C MFR BLD: 7.4 % (ref 0–5.6)
HDLC SERPL-MCNC: 49 MG/DL
LDLC SERPL CALC-MCNC: 109 MG/DL
NONHDLC SERPL-MCNC: 128 MG/DL
POTASSIUM BLD-SCNC: 3.9 MMOL/L (ref 3.4–5.3)
SODIUM SERPL-SCNC: 138 MMOL/L (ref 133–144)
TRIGL SERPL-MCNC: 96 MG/DL

## 2022-04-26 PROCEDURE — 36415 COLL VENOUS BLD VENIPUNCTURE: CPT

## 2022-04-26 PROCEDURE — 83036 HEMOGLOBIN GLYCOSYLATED A1C: CPT

## 2022-04-26 PROCEDURE — 84460 ALANINE AMINO (ALT) (SGPT): CPT

## 2022-04-26 PROCEDURE — 80048 BASIC METABOLIC PNL TOTAL CA: CPT

## 2022-04-26 PROCEDURE — 80061 LIPID PANEL: CPT

## 2022-04-30 ENCOUNTER — HEALTH MAINTENANCE LETTER (OUTPATIENT)
Age: 52
End: 2022-04-30

## 2022-05-04 ENCOUNTER — OFFICE VISIT (OUTPATIENT)
Dept: ENDOCRINOLOGY | Facility: CLINIC | Age: 52
End: 2022-05-04
Payer: COMMERCIAL

## 2022-05-04 ENCOUNTER — DOCUMENTATION ONLY (OUTPATIENT)
Dept: ENDOCRINOLOGY | Facility: CLINIC | Age: 52
End: 2022-05-04

## 2022-05-04 VITALS
HEART RATE: 75 BPM | DIASTOLIC BLOOD PRESSURE: 86 MMHG | BODY MASS INDEX: 29.72 KG/M2 | WEIGHT: 207.1 LBS | SYSTOLIC BLOOD PRESSURE: 135 MMHG

## 2022-05-04 DIAGNOSIS — Z96.41 INSULIN PUMP STATUS: ICD-10-CM

## 2022-05-04 DIAGNOSIS — E10.9 TYPE 1 DIABETES MELLITUS WITHOUT COMPLICATION (H): Primary | ICD-10-CM

## 2022-05-04 PROCEDURE — 99214 OFFICE O/P EST MOD 30 MIN: CPT | Performed by: INTERNAL MEDICINE

## 2022-05-04 NOTE — PROGRESS NOTES
Recent issues:  Diabetes follow-up evaluation  Wearing his Guardian3 sensors regulary, ran out and wonders if [employer] covers these sensors  Recent periumbilical pain, then trial of Prilosec medication... better  Traveling to [facility] for vacation soon           Diagnosis of diabetes mellitus age 14, living in [facility]  ... Initial treatment with insulin injections, using Humalog, NPH, and possibly Lantus insulins  2001. Switched to insulin pump management, Medtronic pump  Has used Medtronic CGM years ago  Previously used the Medtronic 523 pump  Fall 2018. Tried Fiasp insulin, didn't notice much difference, stopped it.. Then back to Novolog  8/2018. Upgraded to Medtronic 670G with Guardian3 sensor              Novolog in pump     Now using the [employer] Contour Link meter, tests 4x/day  Exercise:  Walking              Carries raisins during walks, usual basal settings     5/7/18. Evaluation with [facility] CDE (VB)  Current Medtronic pump settings:       Recent pump Carelink data:          Previous [facility] labs include:  Lab Results   Component Value Date    A1C 7.4 (H) 04/26/2022     04/26/2022    POTASSIUM 3.9 04/26/2022    CHLORIDE 105 04/26/2022    CO2 28 04/26/2022    ANIONGAP 5 04/26/2022     (H) 04/26/2022    BUN 23 04/26/2022    CR 1.21 04/26/2022    GFRESTIMATED 71 04/26/2022    GFRESTBLACK 84 01/25/2021    JOCE 9.1 04/26/2022    CHOL 177 04/26/2022    TRIG 96 04/26/2022    HDL 49 04/26/2022     (H) 04/26/2022    NHDL 128 04/26/2022    UCRR 141 10/15/2021    MICROL 8 10/15/2021    UMALCR 5.67 10/15/2021     Lab Results   Component Value Date    TSH 3.19 10/15/2021    T4 0.98 10/16/2007     Last eye exam 12/2021 at [facility], no active DR noted  DM Complications:  none        , 2-children.    Previously worked for [employer] in [employer], [address] office, now retired  Has seen Dr. Amina Zimmer/[facility] for general medicine evaluations        PMH/PSH:  Past  Medical History:   Diagnosis Date     Congenital hypertrophic pyloric stenosis     surgically corrected as a child     Hemorrhoids      History of diabetic retinopathy      Insulin pump status      Type I (juvenile type) diabetes mellitus without mention of complication, not stated as uncontrolled      Past Surgical History:   Procedure Laterality Date     COLONOSCOPY N/A 8/13/2018    Procedure: COLONOSCOPY;  COLONOSCOPY;  Surgeon: Cruz Momin MD;  Location: [facility]     FINGER SURGERY       ZZC INCISION OF PYLORIC MUSCLE       ZZC INSERT SUBCUT RESERV/PUMP/DEV         Family Hx:  Family History   Problem Relation Age of Onset     Cardiovascular Father         brain aneurysm- fully recovered     Cancer Mother         brain tumor - left her paralized on 1 side     Cerebrovascular Disease Maternal Grandmother      Heart Disease Paternal Grandfather      Heart Disease Paternal Grandmother          Social Hx:  Social History     Socioeconomic History     Marital status:      Spouse name: Not on file     Number of children: 2     Years of education: Not on file     Highest education level: Not on file   Occupational History     Not on file   Tobacco Use     Smoking status: Never Smoker     Smokeless tobacco: Never Used   Vaping Use     Vaping Use: Never used   Substance and Sexual Activity     Alcohol use: No     Drug use: No     Sexual activity: Yes     Partners: Female   Other Topics Concern     Parent/sibling w/ CABG, MI or angioplasty before 65F 55M? No   Social History Narrative     Not on file     Social Determinants of Health     Financial Resource Strain: Not on file   Food Insecurity: Not on file   Transportation Needs: Not on file   Physical Activity: Not on file   Stress: Not on file   Social Connections: Not on file   Intimate Partner Violence: Not on file   Housing Stability: Not on file          MEDICATIONS:  has a current medication list which includes the following prescription(s): aspirin,  "cetirizine hcl, guardian sensor (3), minimed guardian sensor 3, humalog, multi vitamin  mens, omeprazole, acetone urine, blood glucose, contour next test, insulin glargine, insulin infusion pump, paradigm pump reservoir 1.8ml, silhouette 43\" infusion set, and LIPID LOWERING THERAPY NOT PRESCRIBED, INTENTIONAL,.        ROS: 10 point ROS neg other than the symptoms noted above in the HPI.     GENERAL: energy good; weight stable; denies fevers, chills, night sweats.   HEENT: no dysphagia, odonophagia, diplopia, neck pain  THYROID:  no apparent hyper or hypothyroid symptoms  CV: no chest pain, pressure, palpitations  LUNGS: no SOB, VARGAS, cough, wheezing   ABDOMEN: ?heartburn; denies diarrhea, constipation, abdominal pain  EXTREMITIES: mild finger tremors; no rashes, ulcers, edema  NEUROLOGY: no headaches, denies changes in vision, tingling, extremitiy numbness   MSK: no muscle aches or pains, weakness  SKIN: no rashes or lesions  : good erection function, denies nocturia  PSYCH:  stable mood, no significant anxiety or depression  ENDOCRINE: no heat or cold intolerance     Physical Exam   VS: /86   Pulse 75   Wt 93.9 kg (207 lb 1.6 oz)   BMI 29.72 kg/m     GENERAL: AXOX3, NAD, well dressed, answering questions appropriately, appears stated age.  ENT: no nose swelling or nasal discharge, mouth redness or gum changes.  EYES: eyes grossly normal to inspection, conjunctivae and sclerae normal, no exophthalmos or proptosis  THYROID:  no apparent nodules or goiter  LUNGS: CTA posteriorly  ABDOMEN: abdomen normal size, nontender to palpation  EXTREMITIES: no ankle edema  NEUROLOGY: CN grossly intact, no tremors  MSK: grossly intact  SKIN:  Irregular toenails; no apparent skin lesions, rash, or edema with visualized skin appearance  PSYCH: mentation appears normal, affect normal/bright, judgement and insight intact,   normal speech and appearance well groomed    LABS:     All pertinent notes, labs, and images " personally reviewed by me.      A/P:  Encounter Diagnoses   Name Primary?     Type 1 diabetes mellitus without complication (H) Yes     Insulin pump status        Comments:  Reviewed health history and diabetes issues.  Overall glucose control good but postsuppertime hyperglycemia pattern  Reviewed and interpreted tests that I previously ordered.   Ordered appropriate tests for the endocrinology disease management.    Management options discussed and implemented after shared medical decision making with the patient.  T1DM problem is chronic-stable, controlled    Plan:  Reviewed the overall T1DM management and insulin pump use.  Discussed optimal BG testing to assess glucose trends.  We reviewed insulin pump settings, basal rate and bolus dosing  Use of automated pump bolus dosing for meal/snack carb & correction dosing  Reviewed the Medtronic Carelink report     Recommend:  Continue the current Medtronic insulin pump and diabetes management plan.  Pump setting changes:   Bolus ICR 5p 8.0 to 7.8    Try to do more exercise, such as morning walk routine   Discussed benefits on insulin sensitivity and the breakfast to lunch glucose trends  Resume use of CGM sensor... Medtronic Guardian3 (if affordable)    Will send PA for the Guardian3 CGM sensors and transmitters to see if insurance coverage   Consider change to DexcomG6 option, watch YouTube video info  Continue the Humalog or consider trying the new fast-acting Humalog (Lumgev) insulin, discussed previously  No labs ordered at this time  Keep focus on diet, exercise, and weight management  Arrange annual dilated eye exam, fasting lipid panel testing.     Keep regular followup appointments with PCP also  Addressed patient's questions today.    There are no Patient Instructions on file for this visit.    Future labs ordered today: No orders of the defined types were placed in this encounter.    Radiology/Consults ordered today: None    Total time spent in with the  patient evaluation:  18 min  Additional time spent reviewing pertinent lab tests and chart notes, and documentation:  12 min    Follow-up:  8/2022, Brigitte Escalona MD, MS  Endocrinology  [facility]    CC:  RICKEY Zimmer

## 2022-05-04 NOTE — LETTER
5/4/2022         RE: Vinicius Jerome  [address] [facility] [address]        Dear Colleague,    Thank you for referring your patient, Vinicius Jerome, to the [facility] [place]. Please see a copy of my visit note below.      Recent issues:  Diabetes follow-up evaluation  Wearing his Guardian3 sensors regulary, ran out and wonders if [employer] covers these sensors  Recent periumbilical pain, then trial of Prilosec medication... better  Traveling to [facility] for vacation soon           Diagnosis of diabetes mellitus age 14, living in [facility]  ... Initial treatment with insulin injections, using Humalog, NPH, and possibly Lantus insulins  2001. Switched to insulin pump management, Medtronic pump  Has used Medtronic CGM years ago  Previously used the Medtronic 523 pump  Fall 2018. Tried Fiasp insulin, didn't notice much difference, stopped it.. Then back to Novolog  8/2018. Upgraded to Medtronic 670G with Guardian3 sensor              Novolog in pump     Now using the [employer] Contour Link meter, tests 4x/day  Exercise:  Walking              Carries raisins during walks, usual basal settings     5/7/18. Evaluation with [facility] CDE (VB)  Current Medtronic pump settings:       Recent pump Carelink data:          Previous [facility] labs include:  Lab Results   Component Value Date    A1C 7.4 (H) 04/26/2022     04/26/2022    POTASSIUM 3.9 04/26/2022    CHLORIDE 105 04/26/2022    CO2 28 04/26/2022    ANIONGAP 5 04/26/2022     (H) 04/26/2022    BUN 23 04/26/2022    CR 1.21 04/26/2022    GFRESTIMATED 71 04/26/2022    GFRESTBLACK 84 01/25/2021    JOCE 9.1 04/26/2022    CHOL 177 04/26/2022    TRIG 96 04/26/2022    HDL 49 04/26/2022     (H) 04/26/2022    NHDL 128 04/26/2022    UCRR 141 10/15/2021    MICROL 8 10/15/2021    UMALCR 5.67 10/15/2021     Lab Results   Component Value Date    TSH 3.19 10/15/2021    T4 0.98 10/16/2007     Last eye exam 12/2021 at [facility],  no active DR noted  DM Complications:  none        , 2-children.    Previously worked for [employer] in [employer], [place] [employer] office, now retired  Has seen Dr. Amina Zimmer/[facility] for general medicine evaluations        PMH/PSH:  Past Medical History:   Diagnosis Date     Congenital hypertrophic pyloric stenosis     surgically corrected as a child     Hemorrhoids      History of diabetic retinopathy      Insulin pump status      Type I (juvenile type) diabetes mellitus without mention of complication, not stated as uncontrolled      Past Surgical History:   Procedure Laterality Date     COLONOSCOPY N/A 8/13/2018    Procedure: COLONOSCOPY;  COLONOSCOPY;  Surgeon: Cruz Momin MD;  Location: [facility]     FINGER SURGERY       ZZC INCISION OF PYLORIC MUSCLE       ZZC INSERT SUBCUT RESERV/PUMP/DEV         Family Hx:  Family History   Problem Relation Age of Onset     Cardiovascular Father         brain aneurysm- fully recovered     Cancer Mother         brain tumor - left her paralized on 1 side     Cerebrovascular Disease Maternal Grandmother      Heart Disease Paternal Grandfather      Heart Disease Paternal Grandmother          Social Hx:  Social History     Socioeconomic History     Marital status:      Spouse name: Not on file     Number of children: 2     Years of education: Not on file     Highest education level: Not on file   Occupational History     Not on file   Tobacco Use     Smoking status: Never Smoker     Smokeless tobacco: Never Used   Vaping Use     Vaping Use: Never used   Substance and Sexual Activity     Alcohol use: No     Drug use: No     Sexual activity: Yes     Partners: Female   Other Topics Concern     Parent/sibling w/ CABG, MI or angioplasty before 65F 55M? No   Social History Narrative     Not on file     Social Determinants of Health     Financial Resource Strain: Not on file   Food Insecurity: Not on file   Transportation Needs: Not on file   Physical  "Activity: Not on file   Stress: Not on file   Social Connections: Not on file   Intimate Partner Violence: Not on file   Housing Stability: Not on file          MEDICATIONS:  has a current medication list which includes the following prescription(s): aspirin, cetirizine hcl, guardian sensor (3), minimed guardian sensor 3, humalog, multi vitamin  mens, omeprazole, acetone urine, blood glucose, contour next test, insulin glargine, insulin infusion pump, paradigm pump reservoir 1.8ml, silhouette 43\" infusion set, and LIPID LOWERING THERAPY NOT PRESCRIBED, INTENTIONAL,.        ROS: 10 point ROS neg other than the symptoms noted above in the HPI.     GENERAL: energy good; weight stable; denies fevers, chills, night sweats.   HEENT: no dysphagia, odonophagia, diplopia, neck pain  THYROID:  no apparent hyper or hypothyroid symptoms  CV: no chest pain, pressure, palpitations  LUNGS: no SOB, VARGAS, cough, wheezing   ABDOMEN: ?heartburn; denies diarrhea, constipation, abdominal pain  EXTREMITIES: mild finger tremors; no rashes, ulcers, edema  NEUROLOGY: no headaches, denies changes in vision, tingling, extremitiy numbness   MSK: no muscle aches or pains, weakness  SKIN: no rashes or lesions  : good erection function, denies nocturia  PSYCH:  stable mood, no significant anxiety or depression  ENDOCRINE: no heat or cold intolerance     Physical Exam   VS: /86   Pulse 75   Wt 93.9 kg (207 lb 1.6 oz)   BMI 29.72 kg/m     GENERAL: AXOX3, NAD, well dressed, answering questions appropriately, appears stated age.  ENT: no nose swelling or nasal discharge, mouth redness or gum changes.  EYES: eyes grossly normal to inspection, conjunctivae and sclerae normal, no exophthalmos or proptosis  THYROID:  no apparent nodules or goiter  LUNGS: CTA posteriorly  ABDOMEN: abdomen normal size, nontender to palpation  EXTREMITIES: no ankle edema  NEUROLOGY: CN grossly intact, no tremors  MSK: grossly intact  SKIN:  Irregular toenails; no " apparent skin lesions, rash, or edema with visualized skin appearance  PSYCH: mentation appears normal, affect normal/bright, judgement and insight intact,   normal speech and appearance well groomed    LABS:     All pertinent notes, labs, and images personally reviewed by me.      A/P:  Encounter Diagnoses   Name Primary?     Type 1 diabetes mellitus without complication (H) Yes     Insulin pump status        Comments:  Reviewed health history and diabetes issues.  Overall glucose control good but postsuppertime hyperglycemia pattern  Reviewed and interpreted tests that I previously ordered.   Ordered appropriate tests for the endocrinology disease management.    Management options discussed and implemented after shared medical decision making with the patient.  T1DM problem is chronic-stable, controlled    Plan:  Reviewed the overall T1DM management and insulin pump use.  Discussed optimal BG testing to assess glucose trends.  We reviewed insulin pump settings, basal rate and bolus dosing  Use of automated pump bolus dosing for meal/snack carb & correction dosing  Reviewed the Medtronic Carelink report     Recommend:  Continue the current Medtronic insulin pump and diabetes management plan.  Pump setting changes:   Bolus ICR 5p 8.0 to 7.8    Try to do more exercise, such as morning walk routine   Discussed benefits on insulin sensitivity and the breakfast to lunch glucose trends  Resume use of CGM sensor... Medtronic Guardian3 (if affordable)    Will send PA for the Guardian3 CGM sensors and transmitters to see if insurance coverage   Consider change to DexcomG6 option, watch YouTube video info  Continue the Humalog or consider trying the new fast-acting Humalog (Lumgev) insulin, discussed previously  No labs ordered at this time  Keep focus on diet, exercise, and weight management  Arrange annual dilated eye exam, fasting lipid panel testing.     Keep regular followup appointments with PCP also  Addressed  patient's questions today.    There are no Patient Instructions on file for this visit.    Future labs ordered today: No orders of the defined types were placed in this encounter.    Radiology/Consults ordered today: None    Total time spent in with the patient evaluation:  18 min  Additional time spent reviewing pertinent lab tests and chart notes, and documentation:  12 min    Follow-up:  8/2022, Brigitte Escalona MD, MS  Endocrinology  [facility]    CC:  RICKEY Zimmer, thank you for allowing me to participate in the care of your patient.        Sincerely,        Julien Escalona MD

## 2022-05-05 NOTE — PROGRESS NOTES
Mr. DE LEON has Type 1 diabetes mellitus and has used the Medtronic insulin pump with the Guardian3 continuous glucose sensor and transmitter.  This CGM sensor is unique because it directly interacts with the Medtronic pump, using the auto-mode feature.  This feature will provide automatic correction insulin boluses if significant hyperglycemia and also suspend the pump basal rate insulin delivery if low glucose trends.      He has been informed that his insurance plan will not cover the cost of this Guardian3 sensor system.  Since this sensor is medically necessary, we request prior authorization to cover this sensor and transmitter system.    VERONICA Escalona MD, MS  Endocrinology  [facility]

## 2022-05-31 ENCOUNTER — NURSE TRIAGE (OUTPATIENT)
Dept: FAMILY MEDICINE | Facility: CLINIC | Age: 52
End: 2022-05-31
Payer: COMMERCIAL

## 2022-05-31 NOTE — TELEPHONE ENCOUNTER
"S-(situation): Pt wife calling in regarding pt head injury.     B-(background): Pt hit head on car door about 15 min ago and head is bleeding, inquiring where pt should be seen.     A-(assessment): Pt did not lose consciousness, bleeding is slowing down per wife, no fluid coming out of pt ears or nose, no SOB or difficulty breathing. Wife denies pt acting different/more confused. The cut is \"about 1/2 inch.\"     R-(recommendations): Reviewed advise under care tab, will route to PCP to review and advise to call back pt.      Reason for Disposition    Head or forehead injury is main concern    Skin is split open or gaping (length > 1/2 inch or 12 mm)    Additional Information    Negative: Major bleeding (actively dripping or spurting) that can't be stopped    Negative: Bullet, knife or other serious penetrating wound    Negative: Difficulty breathing or choking    Negative: Knocked out (unconscious) > 1 minute    Negative: Difficult to awaken or acting confused (e.g., disoriented, slurred speech)    Negative: Severe neck pain    Negative: Sounds like a life-threatening emergency to the triager    Negative: ACUTE NEUROLOGIC SYMPTOM and symptom present now    Negative: Knocked out (unconscious) > 1 minute    Negative: Seizure (convulsion) occurred (Exception: prior history of seizures and now alert and without Acute Neurologic Symptoms)    Negative: Neck pain after dangerous injury (e.g., MVA, diving, trampoline, contact sports, fall > 10 feet or 3 meters) (Exception: neck pain began > 1 hour after injury)    Negative: Major bleeding (actively dripping or spurting) that can't be stopped    Negative: Penetrating head injury (e.g., knife, gunshot wound, metal object)    Negative: Sounds like a life-threatening emergency to the triager    Negative: Recently examined and diagnosed with a concussion by a healthcare provider and has questions about concussion symptoms    Negative: Can't remember what happened (amnesia)    " Negative: Vomiting once or more    Negative: Watery or blood-tinged fluid dripping from the nose or ears    Negative: ACUTE NEUROLOGIC SYMPTOM and now fine    Negative: Knocked out (unconscious) < 1 minute and now fine    Negative: Severe headache    Negative: Dangerous injury (e.g., MVA, diving, trampoline, contact sports, fall > 10 feet or 3 meters) or severe blow from hard object (e.g., golf club or baseball bat)    Negative: Large swelling or bruise > 2 inches (5 cm)    Protocols used: FACE INJURY-A-OH, HEAD INJURY-A-OH    Jimbo DIEGO RN

## 2022-07-24 DIAGNOSIS — Z96.41 INSULIN PUMP STATUS: ICD-10-CM

## 2022-07-24 DIAGNOSIS — E10.9 TYPE 1 DIABETES MELLITUS WITHOUT COMPLICATION (H): ICD-10-CM

## 2022-07-25 RX ORDER — INSULIN LISPRO 100 [IU]/ML
INJECTION, SOLUTION INTRAVENOUS; SUBCUTANEOUS
Qty: 50 ML | Refills: 3 | Status: SHIPPED | OUTPATIENT
Start: 2022-07-25 | End: 2023-07-23

## 2022-07-25 NOTE — TELEPHONE ENCOUNTER
Last Written Prescription Date:  10/25/2021  Last Fill Quantity: 50ml,  # refills: 0   Last office visit: 5/4/2022 with prescribing provider:  Dr. Escalona    Future Office Visit:  8/17/2022

## 2022-08-17 ENCOUNTER — VIRTUAL VISIT (OUTPATIENT)
Dept: ENDOCRINOLOGY | Facility: CLINIC | Age: 52
End: 2022-08-17
Payer: COMMERCIAL

## 2022-08-17 DIAGNOSIS — E10.3393 TYPE 1 DIABETES MELLITUS WITH MODERATE NONPROLIFERATIVE RETINOPATHY OF BOTH EYES WITHOUT MACULAR EDEMA (H): Primary | ICD-10-CM

## 2022-08-17 DIAGNOSIS — Z96.41 INSULIN PUMP STATUS: ICD-10-CM

## 2022-08-17 PROCEDURE — 99214 OFFICE O/P EST MOD 30 MIN: CPT | Mod: GT | Performed by: INTERNAL MEDICINE

## 2022-08-17 NOTE — LETTER
8/17/2022         RE: Vinicius Jerome  [address] [facility] [address]        Dear Colleague,    Thank you for referring your patient, Vinicius Jerome, to the [facility]. Please see a copy of my visit note below.    Patient is being evaluated via a billable video visit.      How would you like to obtain your AVS? Reviewed verbally  If the video visit is dropped, the invitation should be resent by cell phone  Will anyone else be joining your video visit? no      Video Start Time:  8:30 am    Video-Visit Details    Type of service:  Video Visit    Video End Time: 8:52 am    Originating Location (pt. Location): home    Distant Location (provider location):  [facility]/home    Platform used for Video Visit:  Vixar        Recent issues:  Diabetes follow-up evaluation  Had trip to [facility] in 5/2022, then COVID-19 illness with mild URI symptoms and resolved  Wearing his Guardian3 sensors regulary, ran out and wonders if [employer] covers these sensors             Diagnosis of diabetes mellitus age 14, living in [facility]  ... Initial treatment with insulin injections, using Humalog, NPH, and possibly Lantus insulins  2001. Switched to insulin pump management, Medtronic pump  Has used Medtronic CGM years ago  Previously used the Medtronic 523 pump  Fall 2018. Tried Fiasp insulin, didn't notice much difference, stopped it.. Then back to Novolog  8/2018. Upgraded to Medtronic 670G with Guardian3 sensor              Novolog in pump     Now using the [employer] Contour Link meter, tests 4x/day  Exercise:  Walking              Carries raisins during walks, usual basal settings     5/7/18. Evaluation with [facility] CDE (VB)  Current Medtronic pump settings:       Recent pump Carelink data:              Previous [facility] labs include:  Lab Results   Component Value Date    A1C 7.4 (H) 04/26/2022     04/26/2022    POTASSIUM 3.9 04/26/2022    CHLORIDE 105 04/26/2022     CO2 28 04/26/2022    ANIONGAP 5 04/26/2022     (H) 04/26/2022    BUN 23 04/26/2022    CR 1.21 04/26/2022    GFRESTIMATED 71 04/26/2022    GFRESTBLACK 84 01/25/2021    JOCE 9.1 04/26/2022    CHOL 177 04/26/2022    TRIG 96 04/26/2022    HDL 49 04/26/2022     (H) 04/26/2022    NHDL 128 04/26/2022    UCRR 141 10/15/2021    MICROL 8 10/15/2021    UMALCR 5.67 10/15/2021     Lab Results   Component Value Date    TSH 3.19 10/15/2021    T4 0.98 10/16/2007     Last eye exam 12/2021 at [facility], no active DR noted  DM Complications:  none        , 2-children.    Previously worked for [employer] in [employer], [place] office, now retired  Has seen Dr. Amina Zimmer/[facility] for general medicine evaluations        PMH/PSH:  Past Medical History:   Diagnosis Date     Congenital hypertrophic pyloric stenosis     surgically corrected as a child     Hemorrhoids      History of diabetic retinopathy      Insulin pump status      Type I (juvenile type) diabetes mellitus without mention of complication, not stated as uncontrolled      Past Surgical History:   Procedure Laterality Date     COLONOSCOPY N/A 8/13/2018    Procedure: COLONOSCOPY;  COLONOSCOPY;  Surgeon: Cruz Momin MD;  Location: [facility]     FINGER SURGERY       ZZC INCISION OF PYLORIC MUSCLE       ZZC INSERT SUBCUT RESERV/PUMP/DEV         Family Hx:  Family History   Problem Relation Age of Onset     Cardiovascular Father         brain aneurysm- fully recovered     Cancer Mother         brain tumor - left her paralized on 1 side     Cerebrovascular Disease Maternal Grandmother      Heart Disease Paternal Grandfather      Heart Disease Paternal Grandmother          Social Hx:  Social History     Socioeconomic History     Marital status:      Spouse name: Not on file     Number of children: 2     Years of education: Not on file     Highest education level: Not on file   Occupational History     Not on file  "  Tobacco Use     Smoking status: Never Smoker     Smokeless tobacco: Never Used   Vaping Use     Vaping Use: Never used   Substance and Sexual Activity     Alcohol use: No     Drug use: No     Sexual activity: Yes     Partners: Female   Other Topics Concern     Parent/sibling w/ CABG, MI or angioplasty before 65F 55M? No   Social History Narrative     Not on file     Social Determinants of Health     Financial Resource Strain: Not on file   Food Insecurity: Not on file   Transportation Needs: Not on file   Physical Activity: Not on file   Stress: Not on file   Social Connections: Not on file   Intimate Partner Violence: Not on file   Housing Stability: Not on file          MEDICATIONS:  has a current medication list which includes the following prescription(s): aspirin, cetirizine hcl, guardian sensor (3), minimed guardian sensor 3, humalog, multi vitamin  mens, acetone urine, blood glucose, contour next test, insulin glargine, insulin infusion pump, paradigm pump reservoir 1.8ml, silhouette 43\" infusion set, LIPID LOWERING THERAPY NOT PRESCRIBED, INTENTIONAL,, and omeprazole.        ROS: 10 point ROS neg other than the symptoms noted above in the HPI.     GENERAL: energy good; weight stable; denies fevers, chills, night sweats.   HEENT: no dysphagia, odonophagia, diplopia, neck pain  THYROID:  no apparent hyper or hypothyroid symptoms  CV: no chest pain, pressure, palpitations  LUNGS: no SOB, VARGAS, cough, wheezing   ABDOMEN: ?heartburn; denies diarrhea, constipation, abdominal pain  EXTREMITIES: mild finger tremors; no rashes, ulcers, edema  NEUROLOGY: no headaches, denies changes in vision, tingling, extremitiy numbness   MSK: no muscle aches or pains, weakness  SKIN: no rashes or lesions  : good erection function, denies nocturia  PSYCH:  stable mood, no significant anxiety or depression  ENDOCRINE: no heat or cold intolerance    Physical Exam (visual exam)  VS:  no vital signs taken for video " visit  CONSTITUTIONAL: healthy, alert and NAD, well dressed, answering questions appropriately  ENT: no nose swelling or nasal discharge, mouth redness or gum changes.  EYES: eyes grossly normal to inspection, conjunctivae and sclerae normal, no exophthalmos or proptosis  THYROID:  no apparent nodules or goiter  LUNGS: no audible wheeze, cough or visible cyanosis, no visible retractions or increased work of breathing  ABDOMEN: abdomen not evaluated  EXTREMITIES: no hand tremors, limited exam  NEUROLOGY: CN grossly intact, mentation intact and speech normal   SKIN:  no apparent skin lesions, rash, or edema with visualized skin appearance  PSYCH: mentation appears normal, affect normal/bright, judgement and insight intact,   normal speech and appearance well groomed      LABS:     All pertinent notes, labs, and images personally reviewed by me.      A/P:  Encounter Diagnoses   Name Primary?     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  Overall glucose control good but postsuppertime hyperglycemia pattern  Reviewed and interpreted tests that I previously ordered.   Ordered appropriate tests for the endocrinology disease management.    Management options discussed and implemented after shared medical decision making with the patient.  T1DM problem is chronic-stable, controlled    Plan:  Reviewed the overall T1DM management and insulin pump use.  Discussed optimal BG testing to assess glucose trends.  We reviewed insulin pump settings, basal rate and bolus dosing  Use of automated pump bolus dosing for meal/snack carb & correction dosing  Reviewed the Medtronic Carelink report     Recommend:  Continue the current Medtronic insulin pump and diabetes management plan.  Pump setting changes:   Bolus ICR 5a 7.6 to 8   Basals: MN 0.625 to 0.675 unit(s)/hr    Advised  starting use of a CGM sensor, use the DexcomG6 option   Provided overview of DexcomG6, learn additional details with the YouTube video info   Pair transmitter with smartphone, DexcomG6 and Clarity apps   Will leave sample DexcomG6 kit(s) for patient to  at our office  Continue the Humalog or consider trying the new fast-acting Humalog (Lumgev) insulin, discussed previously  Plan fasting lab appt soon   Lab orders placed  Keep focus on diet, exercise, and weight management  Plan use of lower temp basal rate and wear CGM during hiking trip to [place] in next few months  Arrange annual dilated eye exam, fasting lipid panel testing.     Keep regular followup appointments with PCP also  Addressed patient's questions today.    There are no Patient Instructions on file for this visit.    Future labs ordered today:   Orders Placed This Encounter   Procedures     TSH with free T4 reflex     Hemoglobin A1c     Basic metabolic panel     Albumin Random Urine Quantitative with Creat Ratio     Lipid panel reflex to direct LDL Fasting     Radiology/Consults ordered today: None    Total time spent in with the patient evaluation:  22 min  Additional time spent reviewing pertinent lab tests and chart notes, and documentation:  11 min    Follow-up:  8/31/22 at 7:45 am, Brigitte Escalona MD, MS  Endocrinology  [facility]    CC:  RICKEY Zimmer                      Again, thank you for allowing me to participate in the care of your patient.        Sincerely,        Julien Escalona MD

## 2022-08-17 NOTE — PROGRESS NOTES
Patient is being evaluated via a billable video visit.      How would you like to obtain your AVS? Reviewed verbally  If the video visit is dropped, the invitation should be resent by cell phone  Will anyone else be joining your video visit? no      Video Start Time:  8:30 am    Video-Visit Details    Type of service:  Video Visit    Video End Time: 8:52 am    Originating Location (pt. Location): home    Distant Location (provider location):  [facility]/home    Platform used for Video Visit:  Kinza        Recent issues:  Diabetes follow-up evaluation  Had trip to [facility] in 5/2022, then COVID-19 illness with mild URI symptoms and resolved  Wearing his Guardian3 sensors regulary, ran out and wonders if [employer] covers these sensors             Diagnosis of diabetes mellitus age 14, living in [facility]  ... Initial treatment with insulin injections, using Humalog, NPH, and possibly Lantus insulins  2001. Switched to insulin pump management, Medtronic pump  Has used Medtronic CGM years ago  Previously used the Medtronic 523 pump  Fall 2018. Tried Fiasp insulin, didn't notice much difference, stopped it.. Then back to Novolog  8/2018. Upgraded to Medtronic 670G with Guardian3 sensor              Novolog in pump     Now using the AngioChem Contour Link meter, tests 4x/day  Exercise:  Walking              Carries raisins during walks, usual basal settings     5/7/18. Evaluation with [facility] CDE (VB)  Current Medtronic pump settings:       Recent pump Carelink data:              Previous [facility] labs include:  Lab Results   Component Value Date    A1C 7.4 (H) 04/26/2022     04/26/2022    POTASSIUM 3.9 04/26/2022    CHLORIDE 105 04/26/2022    CO2 28 04/26/2022    ANIONGAP 5 04/26/2022     (H) 04/26/2022    BUN 23 04/26/2022    CR 1.21 04/26/2022    GFRESTIMATED 71 04/26/2022    GFRESTBLACK 84 01/25/2021    JOCE 9.1 04/26/2022    CHOL 177 04/26/2022    TRIG 96 04/26/2022    HDL 49  04/26/2022     (H) 04/26/2022    NHDL 128 04/26/2022    UCRR 141 10/15/2021    MICROL 8 10/15/2021    UMALCR 5.67 10/15/2021     Lab Results   Component Value Date    TSH 3.19 10/15/2021    T4 0.98 10/16/2007     Last eye exam 12/2021 at [facility], no active DR noted  DM Complications:  none        , 2-children.    Previously worked for [employer] in [employer], [employer] office, now retired  Has seen Dr. Amina Zimmer/[facility] for general medicine evaluations        PMH/PSH:  Past Medical History:   Diagnosis Date     Congenital hypertrophic pyloric stenosis     surgically corrected as a child     Hemorrhoids      History of diabetic retinopathy      Insulin pump status      Type I (juvenile type) diabetes mellitus without mention of complication, not stated as uncontrolled      Past Surgical History:   Procedure Laterality Date     COLONOSCOPY N/A 8/13/2018    Procedure: COLONOSCOPY;  COLONOSCOPY;  Surgeon: Cruz Momin MD;  Location: [facility]     FINGER SURGERY       ZZC INCISION OF PYLORIC MUSCLE       ZZC INSERT SUBCUT RESERV/PUMP/DEV         Family Hx:  Family History   Problem Relation Age of Onset     Cardiovascular Father         brain aneurysm- fully recovered     Cancer Mother         brain tumor - left her paralized on 1 side     Cerebrovascular Disease Maternal Grandmother      Heart Disease Paternal Grandfather      Heart Disease Paternal Grandmother          Social Hx:  Social History     Socioeconomic History     Marital status:      Spouse name: Not on file     Number of children: 2     Years of education: Not on file     Highest education level: Not on file   Occupational History     Not on file   Tobacco Use     Smoking status: Never Smoker     Smokeless tobacco: Never Used   Vaping Use     Vaping Use: Never used   Substance and Sexual Activity     Alcohol use: No     Drug use: No     Sexual activity: Yes     Partners: Female   Other Topics  "Concern     Parent/sibling w/ CABG, MI or angioplasty before 65F 55M? No   Social History Narrative     Not on file     Social Determinants of Health     Financial Resource Strain: Not on file   Food Insecurity: Not on file   Transportation Needs: Not on file   Physical Activity: Not on file   Stress: Not on file   Social Connections: Not on file   Intimate Partner Violence: Not on file   Housing Stability: Not on file          MEDICATIONS:  has a current medication list which includes the following prescription(s): aspirin, cetirizine hcl, guardian sensor (3), minimed guardian sensor 3, humalog, multi vitamin  mens, acetone urine, blood glucose, contour next test, insulin glargine, insulin infusion pump, paradigm pump reservoir 1.8ml, silhouette 43\" infusion set, LIPID LOWERING THERAPY NOT PRESCRIBED, INTENTIONAL,, and omeprazole.        ROS: 10 point ROS neg other than the symptoms noted above in the HPI.     GENERAL: energy good; weight stable; denies fevers, chills, night sweats.   HEENT: no dysphagia, odonophagia, diplopia, neck pain  THYROID:  no apparent hyper or hypothyroid symptoms  CV: no chest pain, pressure, palpitations  LUNGS: no SOB, VARGAS, cough, wheezing   ABDOMEN: ?heartburn; denies diarrhea, constipation, abdominal pain  EXTREMITIES: mild finger tremors; no rashes, ulcers, edema  NEUROLOGY: no headaches, denies changes in vision, tingling, extremitiy numbness   MSK: no muscle aches or pains, weakness  SKIN: no rashes or lesions  : good erection function, denies nocturia  PSYCH:  stable mood, no significant anxiety or depression  ENDOCRINE: no heat or cold intolerance    Physical Exam (visual exam)  VS:  no vital signs taken for video visit  CONSTITUTIONAL: healthy, alert and NAD, well dressed, answering questions appropriately  ENT: no nose swelling or nasal discharge, mouth redness or gum changes.  EYES: eyes grossly normal to inspection, conjunctivae and sclerae normal, no exophthalmos or " proptosis  THYROID:  no apparent nodules or goiter  LUNGS: no audible wheeze, cough or visible cyanosis, no visible retractions or increased work of breathing  ABDOMEN: abdomen not evaluated  EXTREMITIES: no hand tremors, limited exam  NEUROLOGY: CN grossly intact, mentation intact and speech normal   SKIN:  no apparent skin lesions, rash, or edema with visualized skin appearance  PSYCH: mentation appears normal, affect normal/bright, judgement and insight intact,   normal speech and appearance well groomed      LABS:     All pertinent notes, labs, and images personally reviewed by me.      A/P:  Encounter Diagnoses   Name Primary?     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  Overall glucose control good but postsuppertime hyperglycemia pattern  Reviewed and interpreted tests that I previously ordered.   Ordered appropriate tests for the endocrinology disease management.    Management options discussed and implemented after shared medical decision making with the patient.  T1DM problem is chronic-stable, controlled    Plan:  Reviewed the overall T1DM management and insulin pump use.  Discussed optimal BG testing to assess glucose trends.  We reviewed insulin pump settings, basal rate and bolus dosing  Use of automated pump bolus dosing for meal/snack carb & correction dosing  Reviewed the Medtronic Carelink report     Recommend:  Continue the current Medtronic insulin pump and diabetes management plan.  Pump setting changes:   Bolus ICR 5a 7.6 to 8   Basals: MN 0.625 to 0.675 unit(s)/hr    Advised starting use of a CGM sensor, use the DexcomG6 option   Provided overview of DexcomG6, learn additional details with the YouTube video info   Pair transmitter with smartphone, DexcomG6 and Clarity apps   Will leave sample DexcomG6 kit(s) for patient to  at our  office  Continue the Humalog or consider trying the new fast-acting Humalog (Lumgev) insulin, discussed previously  Plan fasting lab appt soon   Lab orders placed  Keep focus on diet, exercise, and weight management  Plan use of lower temp basal rate and wear CGM during hiking trip to [place] in next few months  Arrange annual dilated eye exam, fasting lipid panel testing.     Keep regular followup appointments with PCP also  Addressed patient's questions today.    There are no Patient Instructions on file for this visit.    Future labs ordered today:   Orders Placed This Encounter   Procedures     TSH with free T4 reflex     Hemoglobin A1c     Basic metabolic panel     Albumin Random Urine Quantitative with Creat Ratio     Lipid panel reflex to direct LDL Fasting     Radiology/Consults ordered today: None    Total time spent in with the patient evaluation:  22 min  Additional time spent reviewing pertinent lab tests and chart notes, and documentation:  11 min    Follow-up:  8/31/22 at 7:45 am, Brigitte Escalona MD, MS  Endocrinology  [facility]    CC:  RICKEY Zimmer

## 2022-08-18 ENCOUNTER — TELEPHONE (OUTPATIENT)
Dept: ENDOCRINOLOGY | Facility: CLINIC | Age: 52
End: 2022-08-18

## 2022-08-22 NOTE — TELEPHONE ENCOUNTER
Pt returned call and stated he thought he was already on the schedule for 8/31/22 at 7:45am with Dr Escalona as previously discussed at last appt. Pt wondering if this would still be possible? Pt scheduled labs for 8/30/22. Please follow up.

## 2022-08-30 ENCOUNTER — LAB (OUTPATIENT)
Dept: LAB | Facility: CLINIC | Age: 52
End: 2022-08-30
Payer: COMMERCIAL

## 2022-08-30 DIAGNOSIS — E10.3393 TYPE 1 DIABETES MELLITUS WITH MODERATE NONPROLIFERATIVE RETINOPATHY OF BOTH EYES WITHOUT MACULAR EDEMA (H): ICD-10-CM

## 2022-08-30 LAB
ANION GAP SERPL CALCULATED.3IONS-SCNC: 6 MMOL/L (ref 3–14)
BUN SERPL-MCNC: 16 MG/DL (ref 7–30)
CALCIUM SERPL-MCNC: 8.7 MG/DL (ref 8.5–10.1)
CHLORIDE BLD-SCNC: 108 MMOL/L (ref 94–109)
CHOLEST SERPL-MCNC: 164 MG/DL
CO2 SERPL-SCNC: 25 MMOL/L (ref 20–32)
CREAT SERPL-MCNC: 1.07 MG/DL (ref 0.66–1.25)
FASTING STATUS PATIENT QL REPORTED: YES
GFR SERPL CREATININE-BSD FRML MDRD: 82 ML/MIN/1.73M2
GLUCOSE BLD-MCNC: 153 MG/DL (ref 70–99)
HBA1C MFR BLD: 7.3 % (ref 0–5.6)
HDLC SERPL-MCNC: 49 MG/DL
LDLC SERPL CALC-MCNC: 105 MG/DL
NONHDLC SERPL-MCNC: 115 MG/DL
POTASSIUM BLD-SCNC: 4.2 MMOL/L (ref 3.4–5.3)
SODIUM SERPL-SCNC: 139 MMOL/L (ref 133–144)
T4 FREE SERPL-MCNC: 0.93 NG/DL (ref 0.76–1.46)
TRIGL SERPL-MCNC: 52 MG/DL
TSH SERPL DL<=0.005 MIU/L-ACNC: 5.59 MU/L (ref 0.4–4)

## 2022-08-30 PROCEDURE — 84439 ASSAY OF FREE THYROXINE: CPT

## 2022-08-30 PROCEDURE — 83036 HEMOGLOBIN GLYCOSYLATED A1C: CPT

## 2022-08-30 PROCEDURE — 82043 UR ALBUMIN QUANTITATIVE: CPT

## 2022-08-30 PROCEDURE — 36415 COLL VENOUS BLD VENIPUNCTURE: CPT

## 2022-08-30 PROCEDURE — 80061 LIPID PANEL: CPT

## 2022-08-30 PROCEDURE — 84443 ASSAY THYROID STIM HORMONE: CPT

## 2022-08-30 PROCEDURE — 80048 BASIC METABOLIC PNL TOTAL CA: CPT

## 2022-08-31 ENCOUNTER — VIRTUAL VISIT (OUTPATIENT)
Dept: ENDOCRINOLOGY | Facility: CLINIC | Age: 52
End: 2022-08-31
Payer: COMMERCIAL

## 2022-08-31 DIAGNOSIS — Z96.41 INSULIN PUMP STATUS: ICD-10-CM

## 2022-08-31 DIAGNOSIS — E10.9 TYPE 1 DIABETES MELLITUS WITHOUT COMPLICATION (H): Primary | ICD-10-CM

## 2022-08-31 DIAGNOSIS — Z86.39 HISTORY OF DIABETIC RETINOPATHY: ICD-10-CM

## 2022-08-31 LAB
CREAT UR-MCNC: 270 MG/DL
MICROALBUMIN UR-MCNC: 10 MG/L
MICROALBUMIN/CREAT UR: 3.7 MG/G CR (ref 0–17)

## 2022-08-31 PROCEDURE — 95251 CONT GLUC MNTR ANALYSIS I&R: CPT | Performed by: INTERNAL MEDICINE

## 2022-08-31 PROCEDURE — 99213 OFFICE O/P EST LOW 20 MIN: CPT | Mod: GT | Performed by: INTERNAL MEDICINE

## 2022-08-31 RX ORDER — PROCHLORPERAZINE 25 MG/1
1 SUPPOSITORY RECTAL CONTINUOUS PRN
Qty: 9 EACH | Refills: 3 | Status: SHIPPED | OUTPATIENT
Start: 2022-08-31 | End: 2023-07-20

## 2022-08-31 RX ORDER — PROCHLORPERAZINE 25 MG/1
1 SUPPOSITORY RECTAL CONTINUOUS PRN
Qty: 1 EACH | Refills: 3 | Status: SHIPPED | OUTPATIENT
Start: 2022-08-31 | End: 2023-07-20

## 2022-08-31 NOTE — LETTER
8/31/2022         RE: Vinicius Jerome  [address] [address] [id number]        Dear Colleague,    Thank you for referring your patient, Vinicius Jerome, to the [facility]. Please see a copy of my visit note below.    Patient is being evaluated via a billable video visit.      How would you like to obtain your AVS? Reviewed verbally  If the video visit is dropped, the invitation should be resent by cell phone  Will anyone else be joining your video visit? no      Video Start Time:  7:45 am    Video-Visit Details    Type of service:  Video Visit    Video End Time:  8:01 am    Originating Location (pt. Location): home    Distant Location (provider location):  [facility]/home    Platform used for Video Visit:  [employer]        Recent issues:  Diabetes follow-up evaluation  Now wearing the DexcomG6 CGM past 1-2 weeks, likes it  Had trip to [place] with hiking, no diabetes related issues reported           Diagnosis of diabetes mellitus age 14, living in [facility]  ... Initial treatment with insulin injections, using Humalog, NPH, and possibly Lantus insulins  2001. Switched to insulin pump management, Medtronic pump  Has used Medtronic CGM years ago  Previously used the Medtronic 523 pump  Fall 2018. Tried Fiasp insulin, didn't notice much difference, stopped it.. Then back to Novolog  8/2018. Upgraded to Medtronic 670G with Guardian3 sensor              Novolog in pump     Now using the [employer] Contour Link meter, tests 4x/day  Exercise:  Walking              Carries raisins during walks, usual basal settings     5/7/18. Evaluation with [facility] CDE (VB)  Recent Medtronic pump settings:       Recent pump Carelink data:  Info not available today    8/2022. Started DexcomG6 CGM  Recent Dexcom data:              Previous [facility] labs include:  Lab Results   Component Value Date    A1C 7.3 (H) 08/30/2022     08/30/2022    POTASSIUM 4.2 08/30/2022    CHLORIDE 108  08/30/2022    CO2 25 08/30/2022    ANIONGAP 6 08/30/2022     (H) 08/30/2022    BUN 16 08/30/2022    CR 1.07 08/30/2022    GFRESTIMATED 82 08/30/2022    GFRESTBLACK 84 01/25/2021    JOCE 8.7 08/30/2022    CHOL 164 08/30/2022    TRIG 52 08/30/2022    HDL 49 08/30/2022     (H) 08/30/2022    NHDL 115 08/30/2022    UCRR 141 10/15/2021    MICROL 8 10/15/2021    UMALCR 5.67 10/15/2021     Lab Results   Component Value Date    TSH 5.59 (H) 08/30/2022    T4 0.93 08/30/2022     Last eye exam 12/2021 at [facility], no active DR noted  DM Complications:  none        , 2-children.    Previously worked for [employer] in [employer], [employer] office, now retired  Has seen Dr. Amina Zimmer/[facility] for general medicine evaluations        PMH/PSH:  Past Medical History:   Diagnosis Date     Congenital hypertrophic pyloric stenosis     surgically corrected as a child     Hemorrhoids      History of diabetic retinopathy      Insulin pump status      Type I (juvenile type) diabetes mellitus without mention of complication, not stated as uncontrolled      Past Surgical History:   Procedure Laterality Date     COLONOSCOPY N/A 8/13/2018    Procedure: COLONOSCOPY;  COLONOSCOPY;  Surgeon: Cruz Momin MD;  Location: [facility]     FINGER SURGERY       ZZC INCISION OF PYLORIC MUSCLE       ZZC INSERT SUBCUT RESERV/PUMP/DEV         Family Hx:  Family History   Problem Relation Age of Onset     Cardiovascular Father         brain aneurysm- fully recovered     Cancer Mother         brain tumor - left her paralized on 1 side     Cerebrovascular Disease Maternal Grandmother      Heart Disease Paternal Grandfather      Heart Disease Paternal Grandmother          Social Hx:  Social History     Socioeconomic History     Marital status:      Spouse name: Not on file     Number of children: 2     Years of education: Not on file     Highest education level: Not on file   Occupational History      "Not on file   Tobacco Use     Smoking status: Never Smoker     Smokeless tobacco: Never Used   Vaping Use     Vaping Use: Never used   Substance and Sexual Activity     Alcohol use: No     Drug use: No     Sexual activity: Yes     Partners: Female   Other Topics Concern     Parent/sibling w/ CABG, MI or angioplasty before 65F 55M? No   Social History Narrative     Not on file     Social Determinants of Health     Financial Resource Strain: Not on file   Food Insecurity: Not on file   Transportation Needs: Not on file   Physical Activity: Not on file   Stress: Not on file   Social Connections: Not on file   Intimate Partner Violence: Not on file   Housing Stability: Not on file          MEDICATIONS:  has a current medication list which includes the following prescription(s): aspirin, cetirizine hcl, dexcom g6 sensor, dexcom g6 transmitter, humalog, multi vitamin  mens, acetone urine, blood glucose, guardian sensor (3), minimed guardian sensor 3, contour next test, insulin glargine, insulin infusion pump, paradigm pump reservoir 1.8ml, silhouette 43\" infusion set, LIPID LOWERING THERAPY NOT PRESCRIBED, INTENTIONAL,, and omeprazole.        ROS: 10 point ROS neg other than the symptoms noted above in the HPI.     GENERAL: energy good; weight stable; denies fevers, chills, night sweats.   HEENT: no dysphagia, odonophagia, diplopia, neck pain  THYROID:  no apparent hyper or hypothyroid symptoms  CV: no chest pain, pressure, palpitations  LUNGS: no SOB, VARGAS, cough, wheezing   ABDOMEN: ?heartburn; denies diarrhea, constipation, abdominal pain  EXTREMITIES: mild finger tremors; no rashes, ulcers, edema  NEUROLOGY: no headaches, denies changes in vision, tingling, extremitiy numbness   MSK: no muscle aches or pains, weakness  SKIN: no rashes or lesions  : good erection function, denies nocturia  PSYCH:  stable mood, no significant anxiety or depression  ENDOCRINE: no heat or cold intolerance    Physical Exam (visual " exam)  VS:  no vital signs taken for video visit  CONSTITUTIONAL: healthy, alert and NAD, well dressed, answering questions appropriately  ENT: no nose swelling or nasal discharge, mouth redness or gum changes.  EYES: eyes grossly normal to inspection, conjunctivae and sclerae normal, no exophthalmos or proptosis  THYROID:  no apparent nodules or goiter  LUNGS: no audible wheeze, cough or visible cyanosis, no visible retractions or increased work of breathing  ABDOMEN: abdomen not evaluated  EXTREMITIES: no hand tremors, limited exam  NEUROLOGY: CN grossly intact, mentation intact and speech normal   SKIN:  no apparent skin lesions, rash, or edema with visualized skin appearance  PSYCH: mentation appears normal, affect normal/bright, judgement and insight intact,   normal speech and appearance well groomed      LABS:     All pertinent notes, labs, and images personally reviewed by me.      A/P:  Encounter Diagnoses   Name Primary?     Type 1 diabetes mellitus without complication (H) Yes     Insulin pump status      History of diabetic retinopathy        Comments:  Reviewed health history and diabetes issues.  Overall glucose control good but higher trends during his trip to [place]  Reviewed and interpreted tests that I previously ordered.   Ordered appropriate tests for the endocrinology disease management.    Management options discussed and implemented after shared medical decision making with the patient.  T1DM problem is chronic-stable, controlled    Plan:  Reviewed the overall T1DM management and insulin pump use.  Discussed optimal BG testing to assess glucose trends.  We reviewed insulin pump settings, basal rate and bolus dosing  Use of automated pump bolus dosing for meal/snack carb & correction dosing  Reviewed the Medtronic Carelink report   Reviewed recent DexcomG6 CGM glucose trend data, in detail    Recommend:  Continue the current Medtronic insulin pump and diabetes management plan.  No pump setting  changes at this time    Continue use of the DexcomG6 CGM   Discussed routine using SG value for pump Bolus Wizard use   Sent new DexcomG6 Rx's to [facility] per his preference  Continue the Humalog or consider trying the new fast-acting Humalog (Lumgev) insulin, discussed previously  Will review results of his recent [facility] labs when they are completed  Keep focus on diet, exercise, and weight management  Arrange annual dilated eye exam, fasting lipid panel testing.     Keep regular followup appointments with PCP also  Addressed patient's questions today.    There are no Patient Instructions on file for this visit.    Future labs ordered today:   Orders Placed This Encounter   Procedures     GLUCOSE MONITOR, 72 HOUR, PHYS INTERP     Radiology/Consults ordered today: None    Total time spent in with the patient evaluation:  16 min  Additional time spent reviewing pertinent lab tests and chart notes, and documentation:  6 min    Follow-up:  11/21/22 at 2:30 pm, Brigitte Escalona MD, MS  Endocrinology  [facility]                          Again, thank you for allowing me to participate in the care of your patient.        Sincerely,        Julien Escalona MD

## 2022-08-31 NOTE — PROGRESS NOTES
Patient is being evaluated via a billable video visit.      How would you like to obtain your AVS? Reviewed verbally  If the video visit is dropped, the invitation should be resent by cell phone  Will anyone else be joining your video visit? no      Video Start Time:  7:45 am    Video-Visit Details    Type of service:  Video Visit    Video End Time:  8:01 am    Originating Location (pt. Location): home    Distant Location (provider location):  [facility] [place]/home    Platform used for Video Visit:  [facility]        Recent issues:  Diabetes follow-up evaluation  Now wearing the DexcomG6 CGM past 1-2 weeks, likes it  Had trip to [place] with hiking, no diabetes related issues reported           Diagnosis of diabetes mellitus age 14, living in [facility]  ... Initial treatment with insulin injections, using Humalog, NPH, and possibly Lantus insulins  2001. Switched to insulin pump management, Medtronic pump  Has used Medtronic CGM years ago  Previously used the Medtronic 523 pump  Fall 2018. Tried Fiasp insulin, didn't notice much difference, stopped it.. Then back to Novolog  8/2018. Upgraded to Medtronic 670G with Guardian3 sensor              Novolog in pump     Now using the [employer] Contour Link meter, tests 4x/day  Exercise:  Walking              Carries raisins during walks, usual basal settings     5/7/18. Evaluation with [facility] CDE (VB)  Recent Medtronic pump settings:       Recent pump Carelink data:  Info not available today    8/2022. Started DexcomG6 CGM  Recent Dexcom data:              Previous [facility] labs include:  Lab Results   Component Value Date    A1C 7.3 (H) 08/30/2022     08/30/2022    POTASSIUM 4.2 08/30/2022    CHLORIDE 108 08/30/2022    CO2 25 08/30/2022    ANIONGAP 6 08/30/2022     (H) 08/30/2022    BUN 16 08/30/2022    CR 1.07 08/30/2022    GFRESTIMATED 82 08/30/2022    GFRESTBLACK 84 01/25/2021    JOCE 8.7 08/30/2022    CHOL 164 08/30/2022    TRIG 52 08/30/2022    HDL 49  08/30/2022     (H) 08/30/2022    NHDL 115 08/30/2022    UCRR 141 10/15/2021    MICROL 8 10/15/2021    UMALCR 5.67 10/15/2021     Lab Results   Component Value Date    TSH 5.59 (H) 08/30/2022    T4 0.93 08/30/2022     Last eye exam 12/2021 at [facility], no active DR noted  DM Complications:  none        , 2-children.    Previously worked for [employer] in [employer], [employer] office, now retired  Has seen Dr. Amina Zimmer/[facility] for general medicine evaluations        PMH/PSH:  Past Medical History:   Diagnosis Date     Congenital hypertrophic pyloric stenosis     surgically corrected as a child     Hemorrhoids      History of diabetic retinopathy      Insulin pump status      Type I (juvenile type) diabetes mellitus without mention of complication, not stated as uncontrolled      Past Surgical History:   Procedure Laterality Date     COLONOSCOPY N/A 8/13/2018    Procedure: COLONOSCOPY;  COLONOSCOPY;  Surgeon: Cruz Momin MD;  Location: [facility]     FINGER SURGERY       ZZC INCISION OF PYLORIC MUSCLE       ZZC INSERT SUBCUT RESERV/PUMP/DEV         Family Hx:  Family History   Problem Relation Age of Onset     Cardiovascular Father         brain aneurysm- fully recovered     Cancer Mother         brain tumor - left her paralized on 1 side     Cerebrovascular Disease Maternal Grandmother      Heart Disease Paternal Grandfather      Heart Disease Paternal Grandmother          Social Hx:  Social History     Socioeconomic History     Marital status:      Spouse name: Not on file     Number of children: 2     Years of education: Not on file     Highest education level: Not on file   Occupational History     Not on file   Tobacco Use     Smoking status: Never Smoker     Smokeless tobacco: Never Used   Vaping Use     Vaping Use: Never used   Substance and Sexual Activity     Alcohol use: No     Drug use: No     Sexual activity: Yes     Partners: Female   Other Topics  "Concern     Parent/sibling w/ CABG, MI or angioplasty before 65F 55M? No   Social History Narrative     Not on file     Social Determinants of Health     Financial Resource Strain: Not on file   Food Insecurity: Not on file   Transportation Needs: Not on file   Physical Activity: Not on file   Stress: Not on file   Social Connections: Not on file   Intimate Partner Violence: Not on file   Housing Stability: Not on file          MEDICATIONS:  has a current medication list which includes the following prescription(s): aspirin, cetirizine hcl, dexcom g6 sensor, dexcom g6 transmitter, humalog, multi vitamin  mens, acetone urine, blood glucose, guardian sensor (3), minimed guardian sensor 3, contour next test, insulin glargine, insulin infusion pump, paradigm pump reservoir 1.8ml, silhouette 43\" infusion set, LIPID LOWERING THERAPY NOT PRESCRIBED, INTENTIONAL,, and omeprazole.        ROS: 10 point ROS neg other than the symptoms noted above in the HPI.     GENERAL: energy good; weight stable; denies fevers, chills, night sweats.   HEENT: no dysphagia, odonophagia, diplopia, neck pain  THYROID:  no apparent hyper or hypothyroid symptoms  CV: no chest pain, pressure, palpitations  LUNGS: no SOB, VARGAS, cough, wheezing   ABDOMEN: ?heartburn; denies diarrhea, constipation, abdominal pain  EXTREMITIES: mild finger tremors; no rashes, ulcers, edema  NEUROLOGY: no headaches, denies changes in vision, tingling, extremitiy numbness   MSK: no muscle aches or pains, weakness  SKIN: no rashes or lesions  : good erection function, denies nocturia  PSYCH:  stable mood, no significant anxiety or depression  ENDOCRINE: no heat or cold intolerance    Physical Exam (visual exam)  VS:  no vital signs taken for video visit  CONSTITUTIONAL: healthy, alert and NAD, well dressed, answering questions appropriately  ENT: no nose swelling or nasal discharge, mouth redness or gum changes.  EYES: eyes grossly normal to inspection, conjunctivae and " sclerae normal, no exophthalmos or proptosis  THYROID:  no apparent nodules or goiter  LUNGS: no audible wheeze, cough or visible cyanosis, no visible retractions or increased work of breathing  ABDOMEN: abdomen not evaluated  EXTREMITIES: no hand tremors, limited exam  NEUROLOGY: CN grossly intact, mentation intact and speech normal   SKIN:  no apparent skin lesions, rash, or edema with visualized skin appearance  PSYCH: mentation appears normal, affect normal/bright, judgement and insight intact,   normal speech and appearance well groomed      LABS:     All pertinent notes, labs, and images personally reviewed by me.      A/P:  Encounter Diagnoses   Name Primary?     Type 1 diabetes mellitus without complication (H) Yes     Insulin pump status      History of diabetic retinopathy        Comments:  Reviewed health history and diabetes issues.  Overall glucose control good but higher trends during his trip to [place]  Reviewed and interpreted tests that I previously ordered.   Ordered appropriate tests for the endocrinology disease management.    Management options discussed and implemented after shared medical decision making with the patient.  T1DM problem is chronic-stable, controlled    Plan:  Reviewed the overall T1DM management and insulin pump use.  Discussed optimal BG testing to assess glucose trends.  We reviewed insulin pump settings, basal rate and bolus dosing  Use of automated pump bolus dosing for meal/snack carb & correction dosing  Reviewed the Medtronic Carelink report   Reviewed recent DexcomG6 CGM glucose trend data, in detail    Recommend:  Continue the current Medtronic insulin pump and diabetes management plan.  No pump setting changes at this time    Continue use of the DexcomG6 CGM   Discussed routine using SG value for pump Bolus Wizard use   Sent new DexcomG6 Rx's to [facility] per his preference  Continue the Humalog or consider trying the new fast-acting Humalog (Lumgev) insulin,  discussed previously  Will review results of his recent [facility] labs when they are completed  Keep focus on diet, exercise, and weight management  Arrange annual dilated eye exam, fasting lipid panel testing.     Keep regular followup appointments with PCP also  Addressed patient's questions today.    There are no Patient Instructions on file for this visit.    Future labs ordered today:   Orders Placed This Encounter   Procedures     GLUCOSE MONITOR, 72 HOUR, PHYS INTERP     Radiology/Consults ordered today: None    Total time spent in with the patient evaluation:  16 min  Additional time spent reviewing pertinent lab tests and chart notes, and documentation:  6 min    Follow-up:  11/21/22 at 2:30 pm, Brigitte Escalona MD, MS  Endocrinology  [facility]

## 2022-10-09 DIAGNOSIS — Z96.41 INSULIN PUMP STATUS: ICD-10-CM

## 2022-10-09 DIAGNOSIS — E10.9 TYPE 1 DIABETES MELLITUS WITHOUT COMPLICATION (H): ICD-10-CM

## 2022-10-10 RX ORDER — INFUSION SET FOR INSULIN PUMP
INFUSION SETS-PARAPHERNALIA MISCELLANEOUS
Qty: 40 EACH | Refills: 3 | Status: SHIPPED | OUTPATIENT
Start: 2022-10-10 | End: 2023-03-12

## 2022-10-10 RX ORDER — INSULIN PUMP SYRINGE, 1.8 ML
EACH MISCELLANEOUS
Qty: 40 EACH | Refills: 3 | Status: SHIPPED | OUTPATIENT
Start: 2022-10-10 | End: 2023-03-12

## 2022-10-10 NOTE — TELEPHONE ENCOUNTER
Last Written Prescription Date: 8/30/21 and 9/20/21  Last Fill Quantity: 40,  # refills: 3   Last office visit: 8/31/22 with prescribing provider:  Dr. Escalona   Future Office Visit:  11/21/22    Refills sent per protocol  Tawana Sanchez RN

## 2022-11-19 ENCOUNTER — HEALTH MAINTENANCE LETTER (OUTPATIENT)
Age: 52
End: 2022-11-19

## 2022-11-21 ENCOUNTER — VIRTUAL VISIT (OUTPATIENT)
Dept: ENDOCRINOLOGY | Facility: CLINIC | Age: 52
End: 2022-11-21
Payer: COMMERCIAL

## 2022-11-21 DIAGNOSIS — Z96.41 INSULIN PUMP STATUS: ICD-10-CM

## 2022-11-21 DIAGNOSIS — E10.9 TYPE 1 DIABETES MELLITUS WITHOUT COMPLICATION (H): Primary | ICD-10-CM

## 2022-11-21 DIAGNOSIS — Z86.39 HISTORY OF DIABETIC RETINOPATHY: ICD-10-CM

## 2022-11-21 PROCEDURE — 95251 CONT GLUC MNTR ANALYSIS I&R: CPT | Performed by: INTERNAL MEDICINE

## 2022-11-21 PROCEDURE — 99214 OFFICE O/P EST MOD 30 MIN: CPT | Mod: GT | Performed by: INTERNAL MEDICINE

## 2022-11-21 NOTE — PROGRESS NOTES
Patient is being evaluated via a billable video visit.      How would you like to obtain your AVS? Verbally Reviewed  If the video visit is dropped, the invitation should be resent by: Cellphone  Will anyone else be joining your video visit? No        Video-Visit Details    Video Start Time: 2:35 pm    Type of service:  Video Visit    Video End Time:  2:55 pm    Originating Location (pt. Location): Home    PROVIDER LOCATION On-site vs Off-site    Distant Location (provider location):  Home    Platform used for Video Visit: [employer]        Recent issues:  Diabetes follow-up evaluation  Continues to wear the DexcomG6 CGM, use with his Medtronic pump  States Medtronic 670G pump OOW           Diagnosis of diabetes mellitus age 14, living in [facility]  ... Initial treatment with insulin injections, using Humalog, NPH, and possibly Lantus insulins  2001. Switched to insulin pump management, Medtronic pump  Has used Medtronic CGM years ago  Previously used the Medtronic 523 pump  Fall 2018. Tried Fiasp insulin, didn't notice much difference, stopped it.. Then back to Novolog  8/2018. Upgraded to Medtronic 670G with Guardian3 sensor              Novolog in pump     Now using the [employer] Contour Link meter, tests 4x/day  Exercise:  Walking              Carries raisins during walks, usual basal settings     5/7/18. Evaluation with [facility] CDE (DEJON)  Previous Medtronic pump settings:       Recent pump Carelink data:  Info not available today    8/2022. Started DexcomG6 CGM  Recent Dexcom data:             Previous [facility] labs include:  Lab Results   Component Value Date    A1C 7.3 (H) 08/30/2022     08/30/2022    POTASSIUM 4.2 08/30/2022    CHLORIDE 108 08/30/2022    CO2 25 08/30/2022    ANIONGAP 6 08/30/2022     (H) 08/30/2022    BUN 16 08/30/2022    CR 1.07 08/30/2022    GFRESTIMATED 82 08/30/2022    GFRESTBLACK 84 01/25/2021    JOCE 8.7 08/30/2022    CHOL 164 08/30/2022    TRIG 52 08/30/2022    HDL 49 08/30/2022      (H) 08/30/2022    NHDL 115 08/30/2022    UCRR 270 08/30/2022    MICROL 10 08/30/2022    UMALCR 3.70 08/30/2022     Lab Results   Component Value Date    TSH 5.59 (H) 08/30/2022    T4 0.93 08/30/2022     Last eye exam 12/2021 at [facility], no active DR noted  DM Complications:  none        , 2-children.    Previously worked for [employer] in [employer], [employer] office, now retired  Sees Dr. Amina Zimmer/[facility] for general medicine evaluations        PMH/PSH:  Past Medical History:   Diagnosis Date     Congenital hypertrophic pyloric stenosis     surgically corrected as a child     Gastritis      Hemorrhoids      History of diabetic retinopathy      Insulin pump status      Type I (juvenile type) diabetes mellitus without mention of complication, not stated as uncontrolled      Past Surgical History:   Procedure Laterality Date     COLONOSCOPY N/A 8/13/2018    Procedure: COLONOSCOPY;  COLONOSCOPY;  Surgeon: Cruz Momin MD;  Location: [facility]     FINGER SURGERY       ZZC INCISION OF PYLORIC MUSCLE       ZZC INSERT SUBCUT RESERV/PUMP/DEV         Family Hx:  Family History   Problem Relation Age of Onset     Cardiovascular Father         brain aneurysm- fully recovered     Cancer Mother         brain tumor - left her paralized on 1 side     Cerebrovascular Disease Maternal Grandmother      Heart Disease Paternal Grandfather      Heart Disease Paternal Grandmother          Social Hx:  Social History     Socioeconomic History     Marital status:      Spouse name: Not on file     Number of children: 2     Years of education: Not on file     Highest education level: Not on file   Occupational History     Not on file   Tobacco Use     Smoking status: Never     Smokeless tobacco: Never   Vaping Use     Vaping Use: Never used   Substance and Sexual Activity     Alcohol use: No     Drug use: No     Sexual activity: Yes     Partners: Female   Other Topics Concern      "Parent/sibling w/ CABG, MI or angioplasty before 65F 55M? No   Social History Narrative     Not on file     Social Determinants of Health     Financial Resource Strain: Not on file   Food Insecurity: Not on file   Transportation Needs: Not on file   Physical Activity: Not on file   Stress: Not on file   Social Connections: Not on file   Intimate Partner Violence: Not on file   Housing Stability: Not on file          MEDICATIONS:  has a current medication list which includes the following prescription(s): acetone urine, aspirin, blood glucose, cetirizine hcl, dexcom g6 sensor, guardian sensor (3), minimed guardian sensor 3, dexcom g6 transmitter, contour next test, humalog, insulin glargine, insulin infusion pump, paradigm pump reservoir 1.8ml, silhouette 43\" infusion set, LIPID LOWERING THERAPY NOT PRESCRIBED, INTENTIONAL,, multi vitamin  mens, and omeprazole.        ROS: 10 point ROS neg other than the symptoms noted above in the HPI.     GENERAL: energy good; weight stable; denies fevers, chills, night sweats.   HEENT: no dysphagia, odonophagia, diplopia, neck pain  THYROID:  no apparent hyper or hypothyroid symptoms  CV: no chest pain, pressure, palpitations  LUNGS: no SOB, VARGAS, cough, wheezing   ABDOMEN: ?heartburn vs nausea; denies diarrhea, constipation, abdominal pain  EXTREMITIES: mild finger tremors; no rashes, ulcers, edema  NEUROLOGY: no headaches, denies changes in vision, tingling, extremitiy numbness   MSK: no muscle aches or pains, weakness  SKIN: no rashes or lesions  : good erection function, denies nocturia  PSYCH:  stable mood, no significant anxiety or depression  ENDOCRINE: no heat or cold intolerance    Physical Exam (visual exam)  VS:  no vital signs taken for video visit  CONSTITUTIONAL: healthy, alert and NAD, well dressed, answering questions appropriately  ENT: no nose swelling or nasal discharge, mouth redness or gum changes.  EYES: eyes grossly normal to inspection, conjunctivae and " sclerae normal, no exophthalmos or proptosis  THYROID:  no apparent nodules or goiter  LUNGS: no audible wheeze, cough or visible cyanosis, no visible retractions or increased work of breathing  ABDOMEN: abdomen not evaluated  EXTREMITIES: no hand tremors, limited exam  NEUROLOGY: CN grossly intact, mentation intact and speech normal   SKIN:  no apparent skin lesions, rash, or edema with visualized skin appearance  PSYCH: mentation appears normal, affect normal/bright, judgement and insight intact,   normal speech and appearance well groomed      LABS:     All pertinent notes, labs, and images personally reviewed by me.      A/P:  Encounter Diagnoses   Name Primary?     Type 1 diabetes mellitus without complication (H) Yes     Insulin pump status      History of diabetic retinopathy        Comments:  Reviewed health history and diabetes issues.  Overall glucose control good but often fluctuating with postprandial high's and post correction bolus low's  Reviewed and interpreted tests that I previously ordered.   Ordered appropriate tests for the endocrinology disease management.    Management options discussed and implemented after shared medical decision making with the patient.  T1DM problem is chronic-stable, controlled    Plan:  Reviewed the overall T1DM management and insulin pump use.  Discussed optimal BG testing to assess glucose trends.  We reviewed insulin pump settings, basal rate and bolus dosing  Use of automated pump bolus dosing for meal/snack carb & correction dosing  Reviewed value of reviewing the Medtronic Carelink report   Reviewed recent DexcomG6 CGM glucose trend data, in detail    Recommend:  Continue the current Medtronic insulin pump and diabetes management plan.  No pump setting changes at this time    Encouraged him to consider pump change to the Omnipod 5 vs Tandem TSlim, with the DexcomG6 CGM   Reviewed general differences with these 2 pump options   With his low insulin requirements,  advised wearing an Omnipod demo pod    Demo pod will be set aside for him at the [facility]   Advised seeing one of our [facility] Diabetes Educators   Review the Omnipod 5 vs Tandem TSlim options   Diabetes Education Referral placed, see CDE at [facility] clinic  Continue use of the DexcomG6 CGM   Discussed routine using SG value for pump Bolus Wizard use  We have previously reviewed idea of changing to the fast-acting Humalog (Lumgev) insulin  Keep focus on diet, exercise, and weight management  Arrange annual dilated eye exam, fasting lipid panel testing.     Keep regular followup appointments with PCP also  Addressed patient's questions today.    There are no Patient Instructions on file for this visit.    Future labs ordered today:   Orders Placed This Encounter   Procedures     GLUCOSE MONITOR, 72 HOUR, PHYS INTERP     Amb Adult Diabetes Educator Referral     Radiology/Consults ordered today: AMBULATORY ADULT DIABETES EDUCATOR REFERRAL    Total time spent on day of encounter:  35 min    Follow-up:  2/2023, Brigitte Escalona MD, MS  Endocrinology  [facility]    CC:  RICKEY Zimmer

## 2022-11-21 NOTE — LETTER
11/21/2022         RE: Vinicius Jerome  [address] [facility], [place] [address]        Dear Colleague,    Thank you for referring your patient, Vinicius Jerome, to the [facility] [place]. Please see a copy of my visit note below.    Patient is being evaluated via a billable video visit.      How would you like to obtain your AVS? Verbally Reviewed  If the video visit is dropped, the invitation should be resent by: Cellphone  Will anyone else be joining your video visit? No        Video-Visit Details    Video Start Time: 2:35 pm    Type of service:  Video Visit    Video End Time:  2:55 pm    Originating Location (pt. Location): Home    PROVIDER LOCATION On-site vs Off-site    Distant Location (provider location):  Home    Platform used for Video Visit: Kinza        Recent issues:  Diabetes follow-up evaluation  Continues to wear the DexcomG6 CGM, use with his Medtronic pump  States Medtronic 670G pump OOW           Diagnosis of diabetes mellitus age 14, living in [facility]  ... Initial treatment with insulin injections, using Humalog, NPH, and possibly Lantus insulins  2001. Switched to insulin pump management, Medtronic pump  Has used Medtronic CGM years ago  Previously used the Medtronic 523 pump  Fall 2018. Tried Fiasp insulin, didn't notice much difference, stopped it.. Then back to Novolog  8/2018. Upgraded to Medtronic 670G with Guardian3 sensor              Novolog in pump     Now using the AnchorFree Contour Link meter, tests 4x/day  Exercise:  Walking              Carries raisins during walks, usual basal settings     5/7/18. Evaluation with [facility] CDE (VB)  Previous Medtronic pump settings:       Recent pump Carelink data:  Info not available today    8/2022. Started DexcomG6 CGM  Recent Dexcom data:             Previous [facility] labs include:  Lab Results   Component Value Date    A1C 7.3 (H) 08/30/2022     08/30/2022    POTASSIUM 4.2 08/30/2022    CHLORIDE 108 08/30/2022    CO2 25  08/30/2022    ANIONGAP 6 08/30/2022     (H) 08/30/2022    BUN 16 08/30/2022    CR 1.07 08/30/2022    GFRESTIMATED 82 08/30/2022    GFRESTBLACK 84 01/25/2021    JOCE 8.7 08/30/2022    CHOL 164 08/30/2022    TRIG 52 08/30/2022    HDL 49 08/30/2022     (H) 08/30/2022    NHDL 115 08/30/2022    UCRR 270 08/30/2022    MICROL 10 08/30/2022    UMALCR 3.70 08/30/2022     Lab Results   Component Value Date    TSH 5.59 (H) 08/30/2022    T4 0.93 08/30/2022     Last eye exam 12/2021 at [facility], no active DR noted  DM Complications:  none        , 2-children.    Previously worked for [employer] in [employer], [employer] office, now retired  Sees Dr. Amina Zimmer/[facility] for general medicine evaluations        PMH/PSH:  Past Medical History:   Diagnosis Date     Congenital hypertrophic pyloric stenosis     surgically corrected as a child     Gastritis      Hemorrhoids      History of diabetic retinopathy      Insulin pump status      Type I (juvenile type) diabetes mellitus without mention of complication, not stated as uncontrolled      Past Surgical History:   Procedure Laterality Date     COLONOSCOPY N/A 8/13/2018    Procedure: COLONOSCOPY;  COLONOSCOPY;  Surgeon: Cruz Momin MD;  Location: [facility]     FINGER SURGERY       ZZC INCISION OF PYLORIC MUSCLE       ZZC INSERT SUBCUT RESERV/PUMP/DEV         Family Hx:  Family History   Problem Relation Age of Onset     Cardiovascular Father         brain aneurysm- fully recovered     Cancer Mother         brain tumor - left her paralized on 1 side     Cerebrovascular Disease Maternal Grandmother      Heart Disease Paternal Grandfather      Heart Disease Paternal Grandmother          Social Hx:  Social History     Socioeconomic History     Marital status:      Spouse name: Not on file     Number of children: 2     Years of education: Not on file     Highest education level: Not on file   Occupational History     Not on file  "  Tobacco Use     Smoking status: Never     Smokeless tobacco: Never   Vaping Use     Vaping Use: Never used   Substance and Sexual Activity     Alcohol use: No     Drug use: No     Sexual activity: Yes     Partners: Female   Other Topics Concern     Parent/sibling w/ CABG, MI or angioplasty before 65F 55M? No   Social History Narrative     Not on file     Social Determinants of Health     Financial Resource Strain: Not on file   Food Insecurity: Not on file   Transportation Needs: Not on file   Physical Activity: Not on file   Stress: Not on file   Social Connections: Not on file   Intimate Partner Violence: Not on file   Housing Stability: Not on file          MEDICATIONS:  has a current medication list which includes the following prescription(s): acetone urine, aspirin, blood glucose, cetirizine hcl, dexcom g6 sensor, guardian sensor (3), minimed guardian sensor 3, dexcom g6 transmitter, contour next test, humalog, insulin glargine, insulin infusion pump, paradigm pump reservoir 1.8ml, silhouette 43\" infusion set, LIPID LOWERING THERAPY NOT PRESCRIBED, INTENTIONAL,, multi vitamin  mens, and omeprazole.        ROS: 10 point ROS neg other than the symptoms noted above in the HPI.     GENERAL: energy good; weight stable; denies fevers, chills, night sweats.   HEENT: no dysphagia, odonophagia, diplopia, neck pain  THYROID:  no apparent hyper or hypothyroid symptoms  CV: no chest pain, pressure, palpitations  LUNGS: no SOB, VARGAS, cough, wheezing   ABDOMEN: ?heartburn vs nausea; denies diarrhea, constipation, abdominal pain  EXTREMITIES: mild finger tremors; no rashes, ulcers, edema  NEUROLOGY: no headaches, denies changes in vision, tingling, extremitiy numbness   MSK: no muscle aches or pains, weakness  SKIN: no rashes or lesions  : good erection function, denies nocturia  PSYCH:  stable mood, no significant anxiety or depression  ENDOCRINE: no heat or cold intolerance    Physical Exam (visual exam)  VS:  no vital " signs taken for video visit  CONSTITUTIONAL: healthy, alert and NAD, well dressed, answering questions appropriately  ENT: no nose swelling or nasal discharge, mouth redness or gum changes.  EYES: eyes grossly normal to inspection, conjunctivae and sclerae normal, no exophthalmos or proptosis  THYROID:  no apparent nodules or goiter  LUNGS: no audible wheeze, cough or visible cyanosis, no visible retractions or increased work of breathing  ABDOMEN: abdomen not evaluated  EXTREMITIES: no hand tremors, limited exam  NEUROLOGY: CN grossly intact, mentation intact and speech normal   SKIN:  no apparent skin lesions, rash, or edema with visualized skin appearance  PSYCH: mentation appears normal, affect normal/bright, judgement and insight intact,   normal speech and appearance well groomed      LABS:     All pertinent notes, labs, and images personally reviewed by me.      A/P:  Encounter Diagnoses   Name Primary?     Type 1 diabetes mellitus without complication (H) Yes     Insulin pump status      History of diabetic retinopathy        Comments:  Reviewed health history and diabetes issues.  Overall glucose control good but often fluctuating with postprandial high's and post correction bolus low's  Reviewed and interpreted tests that I previously ordered.   Ordered appropriate tests for the endocrinology disease management.    Management options discussed and implemented after shared medical decision making with the patient.  T1DM problem is chronic-stable, controlled    Plan:  Reviewed the overall T1DM management and insulin pump use.  Discussed optimal BG testing to assess glucose trends.  We reviewed insulin pump settings, basal rate and bolus dosing  Use of automated pump bolus dosing for meal/snack carb & correction dosing  Reviewed value of reviewing the CP[employer]ronic Carelink report   Reviewed recent DexcomG6 CGM glucose trend data, in detail    Recommend:  Continue the current Medtronic insulin pump and diabetes  management plan.  No pump setting changes at this time    Encouraged him to consider pump change to the Omnipod 5 vs Tandem TSlim, with the DexcomG6 CGM   Reviewed general differences with these 2 pump options   With his low insulin requirements, advised wearing an Omnipod demo pod    Demo pod will be set aside [facility] him at the [facility]   Advised seeing one of our [facility] Diabetes Educators   Review the Omnipod 5 vs Tandem TSlim options   Diabetes Education Referral placed, see CDE at [facility] AV clinic  Continue use of the DexcomG6 CGM   Discussed routine using SG value for pump Bolus Wizard use  We have previously reviewed idea of changing to the fast-acting Humalog (Lumgev) insulin  Keep focus on diet, exercise, and weight management  Arrange annual dilated eye exam, fasting lipid panel testing.     Keep regular followup appointments with PCP also  Addressed patient's questions today.    There are no Patient Instructions on file for this visit.    Future labs ordered today:   Orders Placed This Encounter   Procedures     GLUCOSE MONITOR, 72 HOUR, PHYS INTERP     Amb Adult Diabetes Educator Referral     Radiology/Consults ordered today: AMBULATORY ADULT DIABETES EDUCATOR REFERRAL    Total time spent on day of encounter:  35 min    Follow-up:  2/2023, Brigitte Escalona MD, MS  Endocrinology  [facility]    CC:  RICKEY Zimmer                            Again, thank you for allowing me to participate in the care of your patient.        Sincerely,        Julien Escalona MD

## 2022-11-22 ENCOUNTER — TELEPHONE (OUTPATIENT)
Dept: ENDOCRINOLOGY | Facility: CLINIC | Age: 52
End: 2022-11-22

## 2022-11-22 NOTE — TELEPHONE ENCOUNTER
Diabetes Education Scheduling Outreach #1:    [employer] message sent to patient requesting to call to schedule.    Silvia Fung OnCall  Diabetes and Nutrition Scheduling

## 2022-12-05 ENCOUNTER — ALLIED HEALTH/NURSE VISIT (OUTPATIENT)
Dept: EDUCATION SERVICES | Facility: CLINIC | Age: 52
End: 2022-12-05
Attending: INTERNAL MEDICINE
Payer: COMMERCIAL

## 2022-12-05 ENCOUNTER — TELEPHONE (OUTPATIENT)
Dept: EDUCATION SERVICES | Facility: CLINIC | Age: 52
End: 2022-12-05

## 2022-12-05 DIAGNOSIS — E10.9 TYPE 1 DIABETES MELLITUS WITHOUT COMPLICATION (H): ICD-10-CM

## 2022-12-05 DIAGNOSIS — E10.9 TYPE 1 DIABETES MELLITUS WITHOUT COMPLICATION (H): Primary | ICD-10-CM

## 2022-12-05 DIAGNOSIS — Z96.41 INSULIN PUMP STATUS: ICD-10-CM

## 2022-12-05 PROCEDURE — G0108 DIAB MANAGE TRN  PER INDIV: HCPCS

## 2022-12-05 NOTE — LETTER
12/5/2022         RE: Vinicius Jerome  [address] [address]        Dear Colleague,    Thank you for referring your patient, Vinicius Jerome, to the [facility]. Please see a copy of my visit note below.      Diabetes Self-Management Education & Support    Presents for: Insulin Pump and CGM Review    Type of Service: In Person Visit    Assessment Type:   REPORTS:                    Insulin Pump Information  Insulin Pump Brand: Medtronic  Infusion Set: Medtronic  Medtronic Infusion Set: Silhouette  Does patient have an insulin multiple daily injection back-up plan?: Yes      ASSESSMENT    Review of CGM report. Glucose overall average 159mg/dl,  in target 62%, above target 35% and below target 3% of the time.     Patient currently on the Medtonic 670G insulin pump and Dexcom G6 sensor. The pump is out of warranty, he would like to look at available options that communicate with the Dexcom G6 sensor.     Discussed 2 available pumps on the market that communicate with the Dexcom G6, Tandem and Omnipod.  He is interested in the Omnipod, he liked the idea of a tubeless pump.     Patient does not feel he needs changes to his pump settings today, he is confident his glucose will improve with integrated pump and CGM.      PLAN:  A request for the Omnipod 5 prescription sent to  for approval.    Once you get the Omnipod 5, when you open the box there will be a card on top with the instructions to guide you on setting up your training.    Follow up with [facility] Diabetes Education 1 week after your pump start. This can be inperson or virtual.     See Care Plan for co-developed, patient-state behavior change goals.  AVS provided for patient today.    Education Materials Provided:  omnipod 5 brochure      SUBJECTIVE/OBJECTIVE:  Presents for: Insulin Pump and CGM Review  Accompanied by: Self  Diabetes education in the past 24mo: No  Focus of Visit: Insulin Pump,  "CGM  Type of Pump visit: Other (discuss pump options)  Diabetes type: Type 1  Disease course: Stable  How confident are you filling out medical forms by yourself:: Extremely  Diabetes management related comments/concerns: Want to understand new pump options  Transportation concerns: No  Difficulty affording diabetes medication?: No  Difficulty affording diabetes testing supplies?: No  Other concerns:: None  Cultural Influences/Ethnic Background:  Not  or     Diabetes Symptoms & Complications:  Fatigue: No  Neuropathy: No  Polydipsia: No  Polyphagia: No  Polyuria: No  Visual change: No  Slow healing wounds: No  Complications assessed today?: Yes  Autonomic neuropathy: No  CVA: No  Heart disease: No  Nephropathy: No  Peripheral neuropathy: No  Peripheral Vascular Disease: No  Retinopathy: No  Sexual dysfunction: No    Patient Problem List and Family Medical History reviewed for relevant medical history, current medical status, and diabetes risk factors.    Vitals:  There were no vitals taken for this visit.  Estimated body mass index is 29.72 kg/m  as calculated from the following:    Height as of 3/3/22: 1.778 m (5' 10\").    Weight as of 5/4/22: 93.9 kg (207 lb 1.6 oz).   Last 3 BP:   BP Readings from Last 3 Encounters:   05/04/22 135/86   03/03/22 122/72   01/24/22 138/87       History   Smoking Status     Never   Smokeless Tobacco     Never       Labs:  Lab Results   Component Value Date    A1C 7.3 08/30/2022    A1C 7.3 01/25/2021     Lab Results   Component Value Date     08/30/2022     01/25/2021     Lab Results   Component Value Date     08/30/2022     10/01/2020     HDL Cholesterol   Date Value Ref Range Status   10/01/2020 47 >39 mg/dL Final     Direct Measure HDL   Date Value Ref Range Status   08/30/2022 49 >=40 mg/dL Final   ]  GFR Estimate   Date Value Ref Range Status   08/30/2022 82 >60 mL/min/1.73m2 Final     Comment:     Effective December 21, 2021 eGFRcr in " adults is calculated using the 2021 CKD-EPI creatinine equation which includes age and gender (Dwayne stout al., [place], DOI: 10.1056/TEDBwj7755952)   01/25/2021 72 >60 mL/min/[1.73_m2] Final     Comment:     Non  GFR Calc  Starting 12/18/2018, serum creatinine based estimated GFR (eGFR) will be   calculated using the Chronic Kidney Disease Epidemiology Collaboration   (CKD-EPI) equation.       GFR Estimate If Black   Date Value Ref Range Status   01/25/2021 84 >60 mL/min/[1.73_m2] Final     Comment:      GFR Calc  Starting 12/18/2018, serum creatinine based estimated GFR (eGFR) will be   calculated using the Chronic Kidney Disease Epidemiology Collaboration   (CKD-EPI) equation.       Lab Results   Component Value Date    CR 1.07 08/30/2022    CR 1.15 01/25/2021     No results found for: MICROALBUMIN    Healthy Eating:  Healthy Eating Assessed Today: No  Cultural/Sabianism diet restrictions?: No  Meal planning/habits: Carb counting  How many times a week on average do you eat food made away from home (restaurant/take-out)?: 1  Meals include: Breakfast, Lunch, Dinner  Beverages: Milk, Diet soda    Being Active:  Being Active Assessed Today: Yes  Exercise:: Yes  Days per week of moderate to strenuous exercise (like a brisk walk): 7  On average, minutes per day of exercise at this level: 30  How intense was your typical exercise? : Moderate (like brisk walking)  Exercise Minutes per Week: 210  Barrier to exercise: None    Monitoring:  Monitoring Assessed Today: Yes  Did patient bring glucose meter to appointment? : Yes  Blood Glucose Meter: ContourNext, CGM  Times checking blood sugar at home (number): 5+  Times checking blood sugar at home (per): Day  Blood glucose trend: No change    Taking Medications:  Diabetes Medication(s)     Insulin       HUMALOG 100 UNIT/ML injection    USE APPROXIMATELY 50 UNITS PER DAY VIA INSULIN PUMP     insulin glargine (LANTUS VIAL) 100 UNIT/ML vial    Inject  11 Units Subcutaneous daily          Taking Medication Assessed Today: Yes  Current Treatments: Insulin Pump  Dose schedule: Pre-breakfast, Pre-lunch, Pre-dinner  Given by: Patient  Injection/Infusion sites: Abdomen  Problems taking diabetes medications regularly?: No  Diabetes medication side effects?: No    Problem Solving:  Problem Solving Assessed Today: Yes  Is the patient at risk for hypoglycemia?: Yes  Hypoglycemia Frequency: Rarely              Reducing Risks:  Reducing Risks Assessed Today: No  CAD Risks: Diabetes Mellitus, Male sex  Has dilated eye exam at least once a year?: Yes  Sees dentist every 6 months?: Yes  Feet checked by healthcare provider in the last year?: Yes    Healthy Coping:  Healthy Coping Assessed Today: Yes  Emotional response to diabetes: Ready to learn  Informal Support system:: Children, Family, Friends, Spouse  Stage of change: Other  Patient Activation Measure Survey Score:  LAURIE Score (Last Two) 1/7/2016   LAURIE Raw Score 50   Activation Score 86.3   LAURIE Level 4         Care Plan and Education Provided:  Care Plan: Diabetes   Updates made by Eli Chawla RN since 12/5/2022 12:00 AM      Problem: HbA1C Not In Goal       Goal: Establish Regular Follow-Ups with PCP       Task: Discuss with PCP the recommended timing for patient's next follow up visit(s)    Responsible User: Eli Chawla RN      Task: Discuss schedule for PCP visits with patient    Responsible User: Eli Chawla RN      Goal: Get HbA1C Level in Goal       Task: Educate patient on diabetes education self-management topics    Responsible User: Eli Chawla RN      Task: Educate patient on benefits of regular glucose monitoring    Responsible User: Eli Chawla RN      Task: Refer patient to appropriate extended care team member, as needed (Medication Therapy Management, Behavioral Health, Physical Therapy, etc.)    Responsible User: Eli Chawla RN      Task: Discuss diabetes treatment plan  with patient    Responsible User: Eli Chawla RN      Problem: Diabetes Self-Management Education Needed to Optimize Self-Care Behaviors       Goal: Understand diabetes pathophysiology and disease progression       Task: Provide education on diabetes pathophysiology and disease progression specfic to patient's diabetes type    Responsible User: Eli Chawla RN      Goal: Healthy Eating - follow a healthy eating pattern for diabetes       Task: Provide education on portion control and consistency in amount, composition and timing of food intake    Responsible User: Eli Chawla RN      Task: Provide education on managing carbohydrate intake (carbohydrate counting, plate planning method, etc.)    Responsible User: Eli Chawla RN      Task: Provide education on weight management    Responsible User: Eli Chawla RN      Task: Provide education on heart healthy eating    Responsible User: Eli Chawla RN      Task: Provide education on eating out    Responsible User: Eli Chawla RN      Task: Develop individualized healthy eating plan with patient    Responsible User: Eli Chawla RN      Goal: Being Active - get regular physical activity, working up to at least 150 minutes per week       Task: Provide education on relationship of activity to glucose and precautions to take if at risk for low glucose    Responsible User: Eli Chawla RN      Task: Discuss barriers to physical activity with patient    Responsible User: Eli Chawla RN      Task: Develop physical activity plan with patient    Responsible User: Eli Chawla RN      Task: Explore community resources including walking groups, assistance programs, and home videos    Responsible User: Eli Chawla RN      Goal: Monitoring - monitor glucose and ketones as directed       Task: Provide education on blood glucose monitoring (purpose, proper technique, frequency, glucose targets, interpreting results,  when to use glucose control solution, sharps disposal)    Responsible User: Eli Chawla RN      Task: Provide education on continuous glucose monitoring (sensor placement, use of flex or /reader, understanding glucose trends, alerts and alarms, differences between sensor glucose and blood glucose)    Responsible User: Eli Chawla RN      Task: Provide education on ketone monitoring (when to monitor, frequency, etc.)    Responsible User: Eli Chawla RN      Goal: Taking Medication - patient is consistently taking medications as directed       Task: Provide education on action of prescribed medication, including when to take and possible side effects Completed 12/5/2022   Responsible User: Eli Chawla RN      Task: Provide education on insulin and injectable diabetes medications, including administration, storage, site selection and rotation for injection sites Completed 12/5/2022   Responsible User: Eli Chawla RN      Task: Discuss barriers to medication adherence with patient and provide management technique ideas as appropriate    Responsible User: Eli Chawla RN      Task: Provide education on frequency and refill details of medications    Responsible User: Eli Chawla RN      Goal: Problem Solving - know how to prevent and manage short-term diabetes complications       Task: Provide education on high blood glucose - causes, signs/symptoms, prevention and treatment    Responsible User: Eli Chawla RN      Task: Provide education on low blood glucose - causes, signs/symptoms, prevention, treatment, carrying a carbohydrate source at all times, and medical identification    Responsible User: Eli Chawla RN      Task: Provide education on safe travel with diabetes    Responsible User: Eli Chawla RN      Task: Provide education on how to care for diabetes on sick days    Responsible User: Eli Chawla RN      Task: Provide education on when to  call a health care provider    Responsible User: Eli Chawla RN      Goal: Reducing Risks - know how to prevent and treat long-term diabetes complications       Task: Provide education on major complications of diabetes, prevention, early diagnostic measures and treatment of complications    Responsible User: Eli Chawla RN      Task: Provide education on recommended care for dental, eye and foot health    Responsible User: Eli Chawla RN      Task: Provide education on Hemoglobin A1c - goals and relationship to blood glucose levels    Responsible User: Eli Chawla RN      Task: Provide education on recommendations for heart health - lipid levels and goals, blood pressure and goals, and aspirin therapy, if indicated    Responsible User: Eli Chawla RN      Task: Provide education on tobacco cessation    Responsible User: Eli Chawla RN      Goal: Healthy Coping - use available resources to cope with the challenges of managing diabetes       Task: Discuss recognizing feelings about having diabetes    Responsible User: Eli Chawla RN      Task: Provide education on the benefits of making appropriate lifestyle changes    Responsible User: Eli Chawla RN      Task: Provide education on benefits of utilizing support systems    Responsible User: Eli Chawla RN      Task: Discuss methods for coping with stress    Responsible User: Eli Chawla RN      Task: Provide education on when to seek professional counseling    Responsible User: Eli Chawla RN Vickie Baeyen BSN, RN, PHN, [facility]     Time Spent: 60 minutes  Encounter Type: Individual    Any diabetes medication dose changes were made via the CDE Protocol per the patient's referring provider. A copy of this encounter was shared with the provider.

## 2022-12-05 NOTE — TELEPHONE ENCOUNTER
,    Please see my notes from today's visit. Vinicius has decided to change to the Omnipod 5 insulin pump to go with the Dexcom sensor he is using.     If you agree please send prescription in  to the [facility] pharmacy.    Eli HERNANDEZN, RN, PHN, [facility]

## 2022-12-05 NOTE — PROGRESS NOTES
Diabetes Self-Management Education & Support    Presents for: Insulin Pump and CGM Review    Type of Service: In Person Visit    Assessment Type:   REPORTS:                    Insulin Pump Information  Insulin Pump Brand: Medtronic  Infusion Set: Medtronic  Medtronic Infusion Set: Delon  Does patient have an insulin multiple daily injection back-up plan?: Yes      ASSESSMENT    Review of CGM report. Glucose overall average 159mg/dl,  in target 62%, above target 35% and below target 3% of the time.     Patient currently on the Medtonic 670G insulin pump and Dexcom G6 sensor. The pump is out of warranty, he would like to look at available options that communicate with the Dexcom G6 sensor.     Discussed 2 available pumps on the market that communicate with the Dexcom G6, Tandem and Omnipod.  He is interested in the Omnipod, he liked the idea of a tubeless pump.     Patient does not feel he needs changes to his pump settings today, he is confident his glucose will improve with integrated pump and CGM.      PLAN:  A request for the Omnipod 5 prescription sent to  for approval.    Once you get the Omnipod 5, when you open the box there will be a card on top with the instructions to guide you on setting up your training.    Follow up with [facility] Diabetes Education 1 week after your pump start. This can be inperson or virtual.     See Care Plan for co-developed, patient-state behavior change goals.  AVS provided for patient today.    Education Materials Provided:  omnipod 5 brochure      SUBJECTIVE/OBJECTIVE:  Presents for: Insulin Pump and CGM Review  Accompanied by: Self  Diabetes education in the past 24mo: No  Focus of Visit: Insulin Pump, CGM  Type of Pump visit: Other (discuss pump options)  Diabetes type: Type 1  Disease course: Stable  How confident are you filling out medical forms by yourself:: Extremely  Diabetes management related comments/concerns: Want to understand new pump  "options  Transportation concerns: No  Difficulty affording diabetes medication?: No  Difficulty affording diabetes testing supplies?: No  Other concerns:: None  Cultural Influences/Ethnic Background:  Not  or     Diabetes Symptoms & Complications:  Fatigue: No  Neuropathy: No  Polydipsia: No  Polyphagia: No  Polyuria: No  Visual change: No  Slow healing wounds: No  Complications assessed today?: Yes  Autonomic neuropathy: No  CVA: No  Heart disease: No  Nephropathy: No  Peripheral neuropathy: No  Peripheral Vascular Disease: No  Retinopathy: No  Sexual dysfunction: No    Patient Problem List and Family Medical History reviewed for relevant medical history, current medical status, and diabetes risk factors.    Vitals:  There were no vitals taken for this visit.  Estimated body mass index is 29.72 kg/m  as calculated from the following:    Height as of 3/3/22: 1.778 m (5' 10\").    Weight as of 5/4/22: 93.9 kg (207 lb 1.6 oz).   Last 3 BP:   BP Readings from Last 3 Encounters:   05/04/22 135/86   03/03/22 122/72   01/24/22 138/87       History   Smoking Status     Never   Smokeless Tobacco     Never       Labs:  Lab Results   Component Value Date    A1C 7.3 08/30/2022    A1C 7.3 01/25/2021     Lab Results   Component Value Date     08/30/2022     01/25/2021     Lab Results   Component Value Date     08/30/2022     10/01/2020     HDL Cholesterol   Date Value Ref Range Status   10/01/2020 47 >39 mg/dL Final     Direct Measure HDL   Date Value Ref Range Status   08/30/2022 49 >=40 mg/dL Final   ]  GFR Estimate   Date Value Ref Range Status   08/30/2022 82 >60 mL/min/1.73m2 Final     Comment:     Effective December 21, 2021 eGFRcr in adults is calculated using the 2021 CKD-EPI creatinine equation which includes age and gender (Dwayne stout al., NEJM, DOI: 10.1056/GEYIda1169452)   01/25/2021 72 >60 mL/min/[1.73_m2] Final     Comment:     Non  GFR Calc  Starting " 12/18/2018, serum creatinine based estimated GFR (eGFR) will be   calculated using the Chronic Kidney Disease Epidemiology Collaboration   (CKD-EPI) equation.       GFR Estimate If Black   Date Value Ref Range Status   01/25/2021 84 >60 mL/min/[1.73_m2] Final     Comment:      GFR Calc  Starting 12/18/2018, serum creatinine based estimated GFR (eGFR) will be   calculated using the Chronic Kidney Disease Epidemiology Collaboration   (CKD-EPI) equation.       Lab Results   Component Value Date    CR 1.07 08/30/2022    CR 1.15 01/25/2021     No results found for: MICROALBUMIN    Healthy Eating:  Healthy Eating Assessed Today: No  Cultural/Jewish diet restrictions?: No  Meal planning/habits: Carb counting  How many times a week on average do you eat food made away from home (restaurant/take-out)?: 1  Meals include: Breakfast, Lunch, Dinner  Beverages: Milk, Diet soda    Being Active:  Being Active Assessed Today: Yes  Exercise:: Yes  Days per week of moderate to strenuous exercise (like a brisk walk): 7  On average, minutes per day of exercise at this level: 30  How intense was your typical exercise? : Moderate (like brisk walking)  Exercise Minutes per Week: 210  Barrier to exercise: None    Monitoring:  Monitoring Assessed Today: Yes  Did patient bring glucose meter to appointment? : Yes  Blood Glucose Meter: ContourNext, CGM  Times checking blood sugar at home (number): 5+  Times checking blood sugar at home (per): Day  Blood glucose trend: No change    Taking Medications:  Diabetes Medication(s)     Insulin       HUMALOG 100 UNIT/ML injection    USE APPROXIMATELY 50 UNITS PER DAY VIA INSULIN PUMP     insulin glargine (LANTUS VIAL) 100 UNIT/ML vial    Inject 11 Units Subcutaneous daily          Taking Medication Assessed Today: Yes  Current Treatments: Insulin Pump  Dose schedule: Pre-breakfast, Pre-lunch, Pre-dinner  Given by: Patient  Injection/Infusion sites: Abdomen  Problems taking diabetes  medications regularly?: No  Diabetes medication side effects?: No    Problem Solving:  Problem Solving Assessed Today: Yes  Is the patient at risk for hypoglycemia?: Yes  Hypoglycemia Frequency: Rarely              Reducing Risks:  Reducing Risks Assessed Today: No  CAD Risks: Diabetes Mellitus, Male sex  Has dilated eye exam at least once a year?: Yes  Sees dentist every 6 months?: Yes  Feet checked by healthcare provider in the last year?: Yes    Healthy Coping:  Healthy Coping Assessed Today: Yes  Emotional response to diabetes: Ready to learn  Informal Support system:: Children, Family, Friends, Spouse  Stage of change: Other  Patient Activation Measure Survey Score:  LAURIE Score (Last Two) 1/7/2016   LAURIE Raw Score 50   Activation Score 86.3   LAURIE Level 4         Care Plan and Education Provided:  Care Plan: Diabetes   Updates made by Eli Chawla RN since 12/5/2022 12:00 AM      Problem: HbA1C Not In Goal       Goal: Establish Regular Follow-Ups with PCP       Task: Discuss with PCP the recommended timing for patient's next follow up visit(s)    Responsible User: Eli Chawla RN      Task: Discuss schedule for PCP visits with patient    Responsible User: Eli Chawla RN      Goal: Get HbA1C Level in Goal       Task: Educate patient on diabetes education self-management topics    Responsible User: Eli Chawla RN      Task: Educate patient on benefits of regular glucose monitoring    Responsible User: Eli Chawla RN      Task: Refer patient to appropriate extended care team member, as needed (Medication Therapy Management, Behavioral Health, Physical Therapy, etc.)    Responsible User: Eli Chawla RN      Task: Discuss diabetes treatment plan with patient    Responsible User: Eli Chawla RN      Problem: Diabetes Self-Management Education Needed to Optimize Self-Care Behaviors       Goal: Understand diabetes pathophysiology and disease progression       Task: Provide education  on diabetes pathophysiology and disease progression specfic to patient's diabetes type    Responsible User: Eli Chawla RN      Goal: Healthy Eating - follow a healthy eating pattern for diabetes       Task: Provide education on portion control and consistency in amount, composition and timing of food intake    Responsible User: Eli Chawla RN      Task: Provide education on managing carbohydrate intake (carbohydrate counting, plate planning method, etc.)    Responsible User: Eli Chawla RN      Task: Provide education on weight management    Responsible User: Eli Chawla RN      Task: Provide education on heart healthy eating    Responsible User: Eli Chawla RN      Task: Provide education on eating out    Responsible User: Eli Chawla RN      Task: Develop individualized healthy eating plan with patient    Responsible User: Eli Chawla RN      Goal: Being Active - get regular physical activity, working up to at least 150 minutes per week       Task: Provide education on relationship of activity to glucose and precautions to take if at risk for low glucose    Responsible User: Eli Chawla RN      Task: Discuss barriers to physical activity with patient    Responsible User: Eli Chawla RN      Task: Develop physical activity plan with patient    Responsible User: Eli Chawla RN      Task: Explore community resources including walking groups, assistance programs, and home videos    Responsible User: Eli Chawla RN      Goal: Monitoring - monitor glucose and ketones as directed       Task: Provide education on blood glucose monitoring (purpose, proper technique, frequency, glucose targets, interpreting results, when to use glucose control solution, sharps disposal)    Responsible User: Eli Chawla RN      Task: Provide education on continuous glucose monitoring (sensor placement, use of flex or /reader, understanding glucose trends, alerts  and alarms, differences between sensor glucose and blood glucose)    Responsible User: Eli Chawla RN      Task: Provide education on ketone monitoring (when to monitor, frequency, etc.)    Responsible User: Eli Chawla RN      Goal: Taking Medication - patient is consistently taking medications as directed       Task: Provide education on action of prescribed medication, including when to take and possible side effects Completed 12/5/2022   Responsible User: Eli Chawla RN      Task: Provide education on insulin and injectable diabetes medications, including administration, storage, site selection and rotation for injection sites Completed 12/5/2022   Responsible User: Eli Chawla RN      Task: Discuss barriers to medication adherence with patient and provide management technique ideas as appropriate    Responsible User: Eli Chawla RN      Task: Provide education on frequency and refill details of medications    Responsible User: Eli Chawla RN      Goal: Problem Solving - know how to prevent and manage short-term diabetes complications       Task: Provide education on high blood glucose - causes, signs/symptoms, prevention and treatment    Responsible User: Eli Chawla RN      Task: Provide education on low blood glucose - causes, signs/symptoms, prevention, treatment, carrying a carbohydrate source at all times, and medical identification    Responsible User: Eli Chawla RN      Task: Provide education on safe travel with diabetes    Responsible User: Eli Chawla RN      Task: Provide education on how to care for diabetes on sick days    Responsible User: Eli Chawla RN      Task: Provide education on when to call a health care provider    Responsible User: Eli Chawla RN      Goal: Reducing Risks - know how to prevent and treat long-term diabetes complications       Task: Provide education on major complications of diabetes, prevention, early  diagnostic measures and treatment of complications    Responsible User: Eli Chawla RN      Task: Provide education on recommended care for dental, eye and foot health    Responsible User: Eli Chawla RN      Task: Provide education on Hemoglobin A1c - goals and relationship to blood glucose levels    Responsible User: Eli Chawla RN      Task: Provide education on recommendations for heart health - lipid levels and goals, blood pressure and goals, and aspirin therapy, if indicated    Responsible User: Eli Chawla RN      Task: Provide education on tobacco cessation    Responsible User: Eli Chawla RN      Goal: Healthy Coping - use available resources to cope with the challenges of managing diabetes       Task: Discuss recognizing feelings about having diabetes    Responsible User: Eli Chawla RN      Task: Provide education on the benefits of making appropriate lifestyle changes    Responsible User: Eli Chawla RN      Task: Provide education on benefits of utilizing support systems    Responsible User: Eli Chawla RN      Task: Discuss methods for coping with stress    Responsible User: Eli Chawla RN      Task: Provide education on when to seek professional counseling    Responsible User: Eli Chawla RN Vickie Baeyen BSMADHURI, RN, PHN, [facility]     Time Spent: 60 minutes  Encounter Type: Individual    Any diabetes medication dose changes were made via the CDE Protocol per the patient's referring provider. A copy of this encounter was shared with the provider.

## 2022-12-05 NOTE — PATIENT INSTRUCTIONS
Plan:  A request for the Omnipod 5 was sent to  for approval.     Once you get the Omnipod 5, when you open the box there will be a card on top with the instructions to guide you on setting up your training.    Follow up with [facility] 1 week after your pump start. This can be inperson or virtual.      Bring blood glucose meter and logbook with you to all doctor and follow-up appointments.     [facility] [facility] for the [facility]:  For Your Diabetes or Nutrition Education Appointments Call:  [phone]   For Diabetes or Nutrition Related Questions:   [phone]  Send [employer] Message   If you need a medication refill please contact your pharmacy. Please allow 3 business days for your refills to be completed.

## 2022-12-06 RX ORDER — INSULIN PMP CART,AUT,G6/7,CNTR
1 EACH SUBCUTANEOUS CONTINUOUS
Qty: 1 KIT | Refills: 0 | Status: SHIPPED | OUTPATIENT
Start: 2022-12-06

## 2022-12-06 RX ORDER — INSULIN PMP CART,AUT,G6/7,CNTR
1 EACH SUBCUTANEOUS
Qty: 30 EACH | Refills: 3 | Status: SHIPPED | OUTPATIENT
Start: 2022-12-06 | End: 2023-03-08

## 2022-12-07 NOTE — TELEPHONE ENCOUNTER
Message reviewed, [facility] diabetes education assistance appreciated.  I prefer using the  [facility] rather than the [facility] mail order pharmacy... for ease of Rx processing and communication.  I signed the Omnipod 5 Rx's.    VERONICA Escalona MD, MS  Endocrinology  [facility]

## 2023-01-18 ENCOUNTER — MEDICAL CORRESPONDENCE (OUTPATIENT)
Dept: HEALTH INFORMATION MANAGEMENT | Facility: CLINIC | Age: 53
End: 2023-01-18

## 2023-02-01 LAB — RETINOPATHY: NEGATIVE

## 2023-02-21 ENCOUNTER — TELEPHONE (OUTPATIENT)
Dept: FAMILY MEDICINE | Facility: CLINIC | Age: 53
End: 2023-02-21

## 2023-02-21 NOTE — TELEPHONE ENCOUNTER
Summary:    Patient is due/failing the following:   STATIN    Reviewed:    [] CARE EVERYWHERE  [] LAST OV NOTE   [] FYI TAB  [] MYCHART ACTIVE?  [] LAST PANEL ENCOUNTER  [] FUTURE APPTS  [] IMMUNIZATIONS  [] Media Tab            Action needed:   Please review and complete statin use with diabetes    Patient does have future appt with Dr. Escalona (Rheumotology)    Type of outreach:    None, routed to provider for review.                                                                               Mariama Chan/TRELL  [place]---[facility]

## 2023-03-08 ENCOUNTER — TRANSFERRED RECORDS (OUTPATIENT)
Dept: HEALTH INFORMATION MANAGEMENT | Facility: CLINIC | Age: 53
End: 2023-03-08

## 2023-03-08 ENCOUNTER — VIRTUAL VISIT (OUTPATIENT)
Dept: ENDOCRINOLOGY | Facility: CLINIC | Age: 53
End: 2023-03-08
Payer: COMMERCIAL

## 2023-03-08 DIAGNOSIS — Z96.41 INSULIN PUMP STATUS: ICD-10-CM

## 2023-03-08 DIAGNOSIS — E03.8 SUBCLINICAL HYPOTHYROIDISM: ICD-10-CM

## 2023-03-08 DIAGNOSIS — E10.9 TYPE 1 DIABETES MELLITUS WITHOUT COMPLICATION (H): Primary | ICD-10-CM

## 2023-03-08 PROCEDURE — 95251 CONT GLUC MNTR ANALYSIS I&R: CPT | Performed by: INTERNAL MEDICINE

## 2023-03-08 PROCEDURE — 99214 OFFICE O/P EST MOD 30 MIN: CPT | Mod: VID | Performed by: INTERNAL MEDICINE

## 2023-03-08 RX ORDER — INSULIN PMP CART,AUT,G6/7,CNTR
1 EACH SUBCUTANEOUS
Qty: 30 EACH | Refills: 3 | Status: SHIPPED | OUTPATIENT
Start: 2023-03-08 | End: 2024-03-17
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
## 2023-03-12 ENCOUNTER — OFFICE VISIT (OUTPATIENT)
Dept: URGENT CARE | Facility: URGENT CARE | Age: 53
End: 2023-03-12
Payer: COMMERCIAL

## 2023-03-12 VITALS
HEART RATE: 70 BPM | SYSTOLIC BLOOD PRESSURE: 124 MMHG | RESPIRATION RATE: 14 BRPM | OXYGEN SATURATION: 98 % | TEMPERATURE: 98.2 F | DIASTOLIC BLOOD PRESSURE: 81 MMHG

## 2023-03-12 DIAGNOSIS — B35.3 TINEA PEDIS OF BOTH FEET: ICD-10-CM

## 2023-03-12 DIAGNOSIS — E10.9 TYPE 1 DIABETES MELLITUS WITHOUT COMPLICATION (H): ICD-10-CM

## 2023-03-12 DIAGNOSIS — L08.9 LOCAL INFECTION OF SKIN AND SUBCUTANEOUS TISSUE: Primary | ICD-10-CM

## 2023-03-12 PROCEDURE — 99214 OFFICE O/P EST MOD 30 MIN: CPT | Performed by: PHYSICIAN ASSISTANT

## 2023-03-12 RX ORDER — CEPHALEXIN 500 MG/1
500 CAPSULE ORAL 3 TIMES DAILY
Qty: 15 CAPSULE | Refills: 0 | Status: SHIPPED | OUTPATIENT
Start: 2023-03-12 | End: 2023-03-17

## 2023-03-12 ASSESSMENT — ENCOUNTER SYMPTOMS
NUMBNESS: 0
FEVER: 0
WOUND: 0
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
## 2023-04-09 ENCOUNTER — HEALTH MAINTENANCE LETTER (OUTPATIENT)
Age: 53
End: 2023-04-09

## 2023-06-01 ENCOUNTER — HEALTH MAINTENANCE LETTER (OUTPATIENT)
Age: 53
End: 2023-06-01

## 2023-06-12 ENCOUNTER — LAB (OUTPATIENT)
Dept: LAB | Facility: CLINIC | Age: 53
End: 2023-06-12
Payer: COMMERCIAL

## 2023-06-12 DIAGNOSIS — E10.9 TYPE 1 DIABETES MELLITUS WITHOUT COMPLICATION (H): ICD-10-CM

## 2023-06-12 DIAGNOSIS — E03.8 SUBCLINICAL HYPOTHYROIDISM: ICD-10-CM

## 2023-06-12 DIAGNOSIS — Z96.41 INSULIN PUMP STATUS: ICD-10-CM

## 2023-06-12 LAB
ALT SERPL W P-5'-P-CCNC: 12 U/L (ref 10–50)
ANION GAP SERPL CALCULATED.3IONS-SCNC: 10 MMOL/L (ref 7–15)
BUN SERPL-MCNC: 19.7 MG/DL (ref 6–20)
CALCIUM SERPL-MCNC: 9.1 MG/DL (ref 8.6–10)
CHLORIDE SERPL-SCNC: 104 MMOL/L (ref 98–107)
CREAT SERPL-MCNC: 1.14 MG/DL (ref 0.67–1.17)
DEPRECATED HCO3 PLAS-SCNC: 27 MMOL/L (ref 22–29)
GFR SERPL CREATININE-BSD FRML MDRD: 76 ML/MIN/1.73M2
GLUCOSE SERPL-MCNC: 99 MG/DL (ref 70–99)
HBA1C MFR BLD: 6.9 % (ref 0–5.6)
POTASSIUM SERPL-SCNC: 4.1 MMOL/L (ref 3.4–5.3)
SODIUM SERPL-SCNC: 141 MMOL/L (ref 136–145)
T4 FREE SERPL-MCNC: 0.99 NG/DL (ref 0.9–1.7)
TSH SERPL DL<=0.005 MIU/L-ACNC: 5.38 UIU/ML (ref 0.3–4.2)

## 2023-06-12 PROCEDURE — 84460 ALANINE AMINO (ALT) (SGPT): CPT

## 2023-06-12 PROCEDURE — 83036 HEMOGLOBIN GLYCOSYLATED A1C: CPT

## 2023-06-12 PROCEDURE — 84439 ASSAY OF FREE THYROXINE: CPT

## 2023-06-12 PROCEDURE — 84443 ASSAY THYROID STIM HORMONE: CPT

## 2023-06-12 PROCEDURE — 80048 BASIC METABOLIC PNL TOTAL CA: CPT

## 2023-06-12 PROCEDURE — 36415 COLL VENOUS BLD VENIPUNCTURE: CPT

## 2023-06-20 ENCOUNTER — VIRTUAL VISIT (OUTPATIENT)
Dept: ENDOCRINOLOGY | Facility: CLINIC | Age: 53
End: 2023-06-20
Payer: COMMERCIAL

## 2023-06-20 DIAGNOSIS — E03.8 SUBCLINICAL HYPOTHYROIDISM: ICD-10-CM

## 2023-06-20 DIAGNOSIS — E10.3393 TYPE 1 DIABETES MELLITUS WITH MODERATE NONPROLIFERATIVE RETINOPATHY OF BOTH EYES WITHOUT MACULAR EDEMA (H): ICD-10-CM

## 2023-06-20 DIAGNOSIS — E10.9 TYPE 1 DIABETES MELLITUS WITHOUT COMPLICATION (H): Primary | ICD-10-CM

## 2023-06-20 DIAGNOSIS — Z96.41 INSULIN PUMP STATUS: ICD-10-CM

## 2023-06-20 PROCEDURE — 99214 OFFICE O/P EST MOD 30 MIN: CPT | Mod: VID | Performed by: INTERNAL MEDICINE

## 2023-06-20 PROCEDURE — 95251 CONT GLUC MNTR ANALYSIS I&R: CPT | Performed by: INTERNAL MEDICINE
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
## 2023-07-20 ENCOUNTER — TELEPHONE (OUTPATIENT)
Dept: ENDOCRINOLOGY | Facility: CLINIC | Age: 53
End: 2023-07-20
Payer: COMMERCIAL

## 2023-07-20 DIAGNOSIS — Z96.41 INSULIN PUMP STATUS: ICD-10-CM

## 2023-07-20 DIAGNOSIS — E10.9 TYPE 1 DIABETES MELLITUS WITHOUT COMPLICATION (H): ICD-10-CM

## 2023-07-20 RX ORDER — PROCHLORPERAZINE 25 MG/1
1 SUPPOSITORY RECTAL CONTINUOUS PRN
Qty: 1 EACH | Refills: 3 | Status: SHIPPED | OUTPATIENT
Start: 2023-07-20 | End: 2024-09-12

## 2023-07-20 RX ORDER — PROCHLORPERAZINE 25 MG/1
1 SUPPOSITORY RECTAL CONTINUOUS PRN
Qty: 9 EACH | Refills: 3 | Status: SHIPPED | OUTPATIENT
Start: 2023-07-20 | End: 2024-06-17

## 2023-07-20 NOTE — TELEPHONE ENCOUNTER
[facility] Call Center    Phone Message    May a detailed message be left on voicemail: yes     Reason for Call: Medication Refill Request    Has the patient contacted the pharmacy for the refill? Yes     Name of medication being requested:   * Dexcom Transmitter and Sensor    * HUMALOG 100 UNIT/ML injection    Provider who prescribed the medication: Iqra    Pharmacy: [facility] - [address]    Date medication is needed: 7/20/2023         Action Taken: Message routed to:  [facility] Endo    Travel Screening: Not Applicable

## 2023-07-20 NOTE — TELEPHONE ENCOUNTER
Refills sent for Dexcom transmitter/sensor.  Rx's .  Last ov 23 notes: Continue use of the DexcomG6 CGM.  Note was sent to provider about Humalog clarification on 23, refill pending.  Will route that encounter again.  Tawana Sanchez RN

## 2023-07-21 NOTE — TELEPHONE ENCOUNTER
Spoke with patient. Patient has enough supply for the weekend of insulin and CGM sensors. Message has been routed to Dr Escalona to advise on insulin.    Buster Valerio RN

## 2023-07-23 DIAGNOSIS — E10.9 TYPE 1 DIABETES MELLITUS WITHOUT COMPLICATION (H): Primary | ICD-10-CM

## 2023-07-23 DIAGNOSIS — Z96.41 INSULIN PUMP STATUS: ICD-10-CM

## 2023-07-23 RX ORDER — INSULIN LISPRO 100 [IU]/ML
INJECTION, SOLUTION INTRAVENOUS; SUBCUTANEOUS
Qty: 50 ML | Refills: 3 | Status: SHIPPED | OUTPATIENT
Start: 2023-07-23 | End: 2024-06-17

## 2023-07-24 NOTE — TELEPHONE ENCOUNTER
Message reviewed.  I have now updated his Humalog vial Rx to [facility].  He had separate messaging from [facility] representative to clarify whether he wished DexcomG6 Rx's to [facility] vs [facility].    VERONICA Escalona MD, MS  Endocrinology  [facility]

## 2023-10-09 ENCOUNTER — LAB (OUTPATIENT)
Dept: LAB | Facility: CLINIC | Age: 53
End: 2023-10-09
Payer: COMMERCIAL

## 2023-10-09 DIAGNOSIS — E10.3393 TYPE 1 DIABETES MELLITUS WITH MODERATE NONPROLIFERATIVE RETINOPATHY OF BOTH EYES WITHOUT MACULAR EDEMA (H): ICD-10-CM

## 2023-10-09 DIAGNOSIS — E03.8 SUBCLINICAL HYPOTHYROIDISM: ICD-10-CM

## 2023-10-09 LAB
ANION GAP SERPL CALCULATED.3IONS-SCNC: 9 MMOL/L (ref 7–15)
BUN SERPL-MCNC: 24.4 MG/DL (ref 6–20)
CALCIUM SERPL-MCNC: 8.9 MG/DL (ref 8.6–10)
CHLORIDE SERPL-SCNC: 106 MMOL/L (ref 98–107)
CHOLEST SERPL-MCNC: 169 MG/DL
CREAT SERPL-MCNC: 1.27 MG/DL (ref 0.67–1.17)
DEPRECATED HCO3 PLAS-SCNC: 24 MMOL/L (ref 22–29)
EGFRCR SERPLBLD CKD-EPI 2021: 67 ML/MIN/1.73M2
GLUCOSE SERPL-MCNC: 136 MG/DL (ref 70–99)
HBA1C MFR BLD: 6.3 % (ref 0–5.6)
HDLC SERPL-MCNC: 41 MG/DL
LDLC SERPL CALC-MCNC: 118 MG/DL
NONHDLC SERPL-MCNC: 128 MG/DL
POTASSIUM SERPL-SCNC: 4.1 MMOL/L (ref 3.4–5.3)
SODIUM SERPL-SCNC: 139 MMOL/L (ref 135–145)
T4 FREE SERPL-MCNC: 1.12 NG/DL (ref 0.9–1.7)
TRIGL SERPL-MCNC: 52 MG/DL
TSH SERPL DL<=0.005 MIU/L-ACNC: 7.2 UIU/ML (ref 0.3–4.2)

## 2023-10-09 PROCEDURE — 84439 ASSAY OF FREE THYROXINE: CPT

## 2023-10-09 PROCEDURE — 82043 UR ALBUMIN QUANTITATIVE: CPT

## 2023-10-09 PROCEDURE — 84443 ASSAY THYROID STIM HORMONE: CPT

## 2023-10-09 PROCEDURE — 82570 ASSAY OF URINE CREATININE: CPT

## 2023-10-09 PROCEDURE — 36415 COLL VENOUS BLD VENIPUNCTURE: CPT

## 2023-10-09 PROCEDURE — 83036 HEMOGLOBIN GLYCOSYLATED A1C: CPT

## 2023-10-09 PROCEDURE — 80048 BASIC METABOLIC PNL TOTAL CA: CPT

## 2023-10-09 PROCEDURE — 80061 LIPID PANEL: CPT

## 2023-10-10 LAB
CREAT UR-MCNC: 232 MG/DL
MICROALBUMIN UR-MCNC: <12 MG/L
MICROALBUMIN/CREAT UR: NORMAL MG/G{CREAT}

## 2023-10-17 ENCOUNTER — VIRTUAL VISIT (OUTPATIENT)
Dept: ENDOCRINOLOGY | Facility: CLINIC | Age: 53
End: 2023-10-17
Payer: COMMERCIAL

## 2023-10-17 DIAGNOSIS — E10.9 TYPE 1 DIABETES MELLITUS WITHOUT COMPLICATION (H): Primary | ICD-10-CM

## 2023-10-17 DIAGNOSIS — E03.8 SUBCLINICAL HYPOTHYROIDISM: ICD-10-CM

## 2023-10-17 DIAGNOSIS — E78.5 HYPERLIPIDEMIA LDL GOAL <100: ICD-10-CM

## 2023-10-17 DIAGNOSIS — Z96.41 INSULIN PUMP STATUS: ICD-10-CM

## 2023-10-17 PROCEDURE — 95251 CONT GLUC MNTR ANALYSIS I&R: CPT | Performed by: INTERNAL MEDICINE

## 2023-10-17 PROCEDURE — 99214 OFFICE O/P EST MOD 30 MIN: CPT | Mod: VID | Performed by: INTERNAL MEDICINE

## 2023-10-17 RX ORDER — ATORVASTATIN CALCIUM 10 MG/1
10 TABLET, FILM COATED ORAL DAILY
Qty: 90 TABLET | Refills: 1 | Status: SHIPPED | OUTPATIENT
Start: 2023-10-17 | End: 2024-03-12
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
## 2023-11-08 ENCOUNTER — OFFICE VISIT (OUTPATIENT)
Dept: URGENT CARE | Facility: URGENT CARE | Age: 53
End: 2023-11-08
Payer: COMMERCIAL

## 2023-11-08 VITALS
RESPIRATION RATE: 16 BRPM | DIASTOLIC BLOOD PRESSURE: 80 MMHG | OXYGEN SATURATION: 97 % | TEMPERATURE: 97.6 F | SYSTOLIC BLOOD PRESSURE: 124 MMHG | HEART RATE: 68 BPM

## 2023-11-08 DIAGNOSIS — M79.662 PAIN OF LEFT CALF: Primary | ICD-10-CM

## 2023-11-08 PROCEDURE — 99213 OFFICE O/P EST LOW 20 MIN: CPT | Performed by: PHYSICIAN ASSISTANT

## 2023-11-09 ENCOUNTER — OFFICE VISIT (OUTPATIENT)
Dept: PEDIATRICS | Facility: CLINIC | Age: 53
End: 2023-11-09
Attending: PHYSICIAN ASSISTANT
Payer: COMMERCIAL

## 2023-11-09 VITALS
BODY MASS INDEX: 30.1 KG/M2 | TEMPERATURE: 97.7 F | HEART RATE: 63 BPM | OXYGEN SATURATION: 98 % | SYSTOLIC BLOOD PRESSURE: 135 MMHG | DIASTOLIC BLOOD PRESSURE: 72 MMHG | WEIGHT: 209.8 LBS

## 2023-11-09 DIAGNOSIS — M79.662 PAIN OF LEFT CALF: ICD-10-CM

## 2023-11-09 LAB — D DIMER PPP FEU-MCNC: <0.27 UG/ML FEU (ref 0–0.5)

## 2023-11-09 PROCEDURE — 36415 COLL VENOUS BLD VENIPUNCTURE: CPT | Performed by: FAMILY MEDICINE

## 2023-11-09 PROCEDURE — 99214 OFFICE O/P EST MOD 30 MIN: CPT | Performed by: FAMILY MEDICINE

## 2023-11-09 PROCEDURE — 85379 FIBRIN DEGRADATION QUANT: CPT | Performed by: FAMILY MEDICINE

## 2023-11-09 NOTE — PATIENT INSTRUCTIONS
Please follow up at the acute diagnostic center tomorrow for further evaluation and diagnostic imaging.

## 2023-11-09 NOTE — PROGRESS NOTES
Assessment & Plan     Pain of left calf  - Referral to Acute and Diagnostic Services (Day of diagnostic / First order acute)  - D dimer quantitative  - D dimer quantitative    D-dimer negative suggesting against thrombosis, consistent with hx which shows no risk factors for a first time episode DVT     Area of discomfort does not suggest knee injury or bakers cyst, suspect mild muscle strain to calf muscle whether it be gastroc/soleus. He has good function which is reassuring. Continue OTC analgesics as needed for pain.         Terrance Gutierrez MD   [facility]    Heri Colvin is a 53 year old male who presents to clinic today for the following health issues:  Chief Complaint   Patient presents with    Leg Pain     Left lower leg pain, outer calf, slight swelling     HPI  Patient is referred to [facility] for evaluation of left calf pain after being seen in [facility] yesterday  There is no warmth to the area. No recent travel. No recent surgeries.   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 (H) 12/17/2016     Priority: Medium    CARDIOVASCULAR SCREENING; LDL GOAL LESS THAN 100 10/07/2010     Priority: Medium    Internal Hemorrhoids 03/21/2008     Priority: Medium     otherwise negative colonoscopy 3/08         Current Outpatient Medications   Medication    acetone urine (KETOSTIX) test strip    ASPIRIN 81 MG OR TABS    atorvastatin (LIPITOR) 10 MG tablet    Cetirizine HCl (ZYRTEC PO)    Continuous Blood Gluc Sensor (DEXCOM G6  SENSOR) MISC    Continuous Blood Gluc Transmit (DEXCOM G6 TRANSMITTER) MISC    CONTOUR NEXT TEST test strip    HUMALOG 100 UNIT/ML injection    Insulin Disposable Pump (OMNIPOD 5 G6 INTRO, GEN 5,) KIT    Insulin Disposable Pump (OMNIPOD 5 G6 POD, GEN 5,) MISC    insulin glargine (LANTUS VIAL) 100 UNIT/ML vial    INSULIN INFUSION PUMP SIMI    LIPID LOWERING THERAPY NOT PRESCRIBED, INTENTIONAL,    MULTI VITAMIN MENS OR TABS     No current facility-administered medications for this visit.         Objective    /72 (BP Location: Right arm, Patient Position: Sitting)   Pulse 63   Temp 97.7  F (36.5  C) (Oral)   Wt 95.2 kg (209 lb 12.8 oz)   SpO2 98%   BMI 30.10 kg/m    Physical Exam         L calf: slightly swollen compared to R calf, no warmth to the touch, raul's negative  Gait: able to heel and toe walk without difficulty  Pulm: non-labored  Results for orders placed or performed in visit on 11/09/23   D dimer quantitative     Status: Normal   Result Value Ref Range    D-Dimer Quantitative <0.27 0.00 - 0.50 ug/mL FEU    Narrative    This D-dimer assay is intended for use in conjunction with a clinical pretest probability assessment model to exclude pulmonary embolism (PE) and deep venous thrombosis (DVT) in outpatients suspected of PE or DVT. The cut-off value is 0.50 ug/mL FEU.    For patients 50 years of age or older, the application of age-adjusted cut-off values for D-Dimer may increase the specificity without significant effect on sensitivity. The literature suggested calculation age adjusted cut-off in ug/L = age in years x 10 ug/L. The results in this laboratory are reported as ug/mL rather than ug/L. The calculation for age adjusted cut off in ug/mL= age in years x 0.01 ug/mL. For example, the cut off for a 76 year old male is 76 x 0.01 ug/mL = 0.76 ug/mL (760 ug/L).    PURA Shannon et al. Age adjusted D-dimer cut-off levels to rule out pulmonary embolism: The ADJUST-PE Study. SULMA 2014;311:9098-1166.;  KARIS Escobar et al. Diagnostic accuracy of conventional or age adjusted D-dimer cutoff values in older patients with suspected venous thromboembolism. Systemic review and meta-analysis. BMJ 2013:346:f2492.             The use of Dragon/Diassessation services may have been used to construct the content in this note; any grammatical or spelling errors are non-intentional. Please contact the author of this note directly if you are in need of any clarification.

## 2023-11-09 NOTE — PROGRESS NOTES
Assessment & Plan     Pain of left calf  DDx: Muscle strain, superficial thrombophlebitis, DVT, baker's cyst,  etc...  Acute problem.  On exam he is in no acute distress. The ADS is closing soon tonight.  Referral to the [facility]S in [place] tomorrow for an ultrasound of the left lower extremity.  Patient agrees with the plan.  - Referral to Acute and Diagnostic Services (Day of diagnostic / First order acute)      Return in about 1 day (around 11/9/2023) for Follow up at the acute diagnostic center in [place]..    Kayli Castaneda PA-C  [facility] [place] is a 53 year old male who presents to clinic today for the following health issues:  Chief Complaint   Patient presents with    Musculoskeletal Problem     Pt reports L leg swelling along calf X 24 hours. Mild pain when pressed.      HPI  Patient is presenting to urgent care Health system with complaint of left calf pain and slight swelling since last night.  Denies any trauma or injury to the affected area.  No chest pain or shortness of breath.  No history of DVT.    Review of Systems  Constitutional, HEENT, cardiovascular, pulmonary, GI, , musculoskeletal, neuro, skin, endocrine and psych systems are negative, except as otherwise noted.      Objective    /80 (BP Location: Right arm, Patient Position: Sitting, Cuff Size: Adult Regular)   Pulse 68   Temp 97.6  F (36.4  C) (Tympanic)   Resp 16   SpO2 97%   Physical Exam   GENERAL: healthy, alert and no distress  RESP: Normal breathing effort  MS: Left calf exam: No gross deformities, swelling or ecchymosis noted.  No tenderness to palpation posterior knee.  Mild tenderness lateral aspect of the left calf.  No erythema, no open wounds or sores.

## 2023-11-09 NOTE — PATIENT INSTRUCTIONS
Tylenol 500mg and/or ibuprofen 400mg as needed for pain      If swelling doesn't improve over the next week or if at any point symptoms worsen please seek help right away

## 2024-01-23 ENCOUNTER — LAB (OUTPATIENT)
Dept: LAB | Facility: CLINIC | Age: 54
End: 2024-01-23
Payer: COMMERCIAL

## 2024-01-23 DIAGNOSIS — E78.5 HYPERLIPIDEMIA LDL GOAL <100: ICD-10-CM

## 2024-01-23 DIAGNOSIS — E03.8 SUBCLINICAL HYPOTHYROIDISM: ICD-10-CM

## 2024-01-23 DIAGNOSIS — E10.9 TYPE 1 DIABETES MELLITUS WITHOUT COMPLICATION (H): ICD-10-CM

## 2024-01-23 LAB
ALT SERPL W P-5'-P-CCNC: 18 U/L (ref 0–70)
CHOLEST SERPL-MCNC: 106 MG/DL
FASTING STATUS PATIENT QL REPORTED: NORMAL
HBA1C MFR BLD: 6.4 % (ref 0–5.6)
HDLC SERPL-MCNC: 40 MG/DL
LDLC SERPL CALC-MCNC: 58 MG/DL
NONHDLC SERPL-MCNC: 66 MG/DL
T4 FREE SERPL-MCNC: 1.07 NG/DL (ref 0.9–1.7)
TRIGL SERPL-MCNC: 40 MG/DL
TSH SERPL DL<=0.005 MIU/L-ACNC: 6.58 UIU/ML (ref 0.3–4.2)

## 2024-01-23 PROCEDURE — 83036 HEMOGLOBIN GLYCOSYLATED A1C: CPT

## 2024-01-23 PROCEDURE — 84460 ALANINE AMINO (ALT) (SGPT): CPT

## 2024-01-23 PROCEDURE — 36415 COLL VENOUS BLD VENIPUNCTURE: CPT

## 2024-01-23 PROCEDURE — 84439 ASSAY OF FREE THYROXINE: CPT

## 2024-01-23 PROCEDURE — 80061 LIPID PANEL: CPT

## 2024-01-23 PROCEDURE — 84443 ASSAY THYROID STIM HORMONE: CPT

## 2024-01-31 ENCOUNTER — VIRTUAL VISIT (OUTPATIENT)
Dept: ENDOCRINOLOGY | Facility: CLINIC | Age: 54
End: 2024-01-31
Payer: COMMERCIAL

## 2024-01-31 DIAGNOSIS — Z96.41 INSULIN PUMP STATUS: ICD-10-CM

## 2024-01-31 DIAGNOSIS — E03.8 SUBCLINICAL HYPOTHYROIDISM: ICD-10-CM

## 2024-01-31 DIAGNOSIS — E10.9 TYPE 1 DIABETES MELLITUS WITHOUT COMPLICATION (H): Primary | ICD-10-CM

## 2024-01-31 PROCEDURE — 99214 OFFICE O/P EST MOD 30 MIN: CPT | Mod: 95 | Performed by: INTERNAL MEDICINE

## 2024-01-31 PROCEDURE — G2211 COMPLEX E/M VISIT ADD ON: HCPCS | Mod: 95 | Performed by: INTERNAL MEDICINE
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
## 2024-02-01 ENCOUNTER — TRANSFERRED RECORDS (OUTPATIENT)
Dept: HEALTH INFORMATION MANAGEMENT | Facility: CLINIC | Age: 54
End: 2024-02-01

## 2024-02-01 LAB — RETINOPATHY: NEGATIVE

## 2024-03-12 ENCOUNTER — MYC REFILL (OUTPATIENT)
Dept: ENDOCRINOLOGY | Facility: CLINIC | Age: 54
End: 2024-03-12
Payer: COMMERCIAL

## 2024-03-12 DIAGNOSIS — E10.9 TYPE 1 DIABETES MELLITUS WITHOUT COMPLICATION (H): ICD-10-CM

## 2024-03-12 DIAGNOSIS — E78.5 HYPERLIPIDEMIA LDL GOAL <100: ICD-10-CM

## 2024-03-13 RX ORDER — ATORVASTATIN CALCIUM 10 MG/1
10 TABLET, FILM COATED ORAL DAILY
Qty: 90 TABLET | Refills: 1 | Status: SHIPPED | OUTPATIENT
Start: 2024-03-13 | End: 2024-06-21

## 2024-03-13 NOTE — TELEPHONE ENCOUNTER
Last Written Prescription Date:  10/17/23  Last Fill Quantity: 90,  # refills: 1   Last office visit: 1/31/2024 with prescribing provider:  Dr. Escalona   Future Office Visit:  5/14/24    Requested Prescriptions   Pending Prescriptions Disp Refills    atorvastatin (LIPITOR) 10 MG tablet 90 tablet 1     Sig: Take 1 tablet (10 mg) by mouth daily       Antihyperlipidemic agents Passed - 3/12/2024  6:41 PM        Passed - LDL on file in the past 12 months        Passed - Medication is active on med list        Passed - Recent (12 mo) or future (90 days) visit within the authorizing provider's specialty     The patient must have completed an in-person or virtual visit within the past 12 months or has a future visit scheduled within the next 90 days with the authorizing provider s specialty.  Urgent care and e-visits do not quality as an office visit for this protocol.          Passed - Patient is age 18 years or older           Refills sent  Tawana Sanchez RN

## 2024-03-17 ENCOUNTER — MYC REFILL (OUTPATIENT)
Dept: ENDOCRINOLOGY | Facility: CLINIC | Age: 54
End: 2024-03-17
Payer: COMMERCIAL

## 2024-03-17 DIAGNOSIS — E10.9 TYPE 1 DIABETES MELLITUS WITHOUT COMPLICATION (H): ICD-10-CM

## 2024-03-18 RX ORDER — INSULIN PMP CART,AUT,G6/7,CNTR
1 EACH SUBCUTANEOUS
Qty: 30 EACH | Refills: 3 | Status: SHIPPED | OUTPATIENT
Start: 2024-03-18

## 2024-03-18 NOTE — TELEPHONE ENCOUNTER
Requested Prescriptions   Pending Prescriptions Disp Refills    Insulin Disposable Pump (OMNIPOD 5 G6 PODS (GEN 5)) MISC 30 each 3     Si pod every 72 hours       There is no refill protocol information for this order        Last Written Prescription Date:  3/8/23  Last Fill Quantity: 30 each,  # refills: 3   Last office visit: 2022 ; last virtual visit: 2024 with prescribing provider:  Dr Escalona   Future Office Visit:  24    Refill sent  Buster Valerio RN

## 2024-05-07 ENCOUNTER — LAB (OUTPATIENT)
Dept: LAB | Facility: CLINIC | Age: 54
End: 2024-05-07
Payer: COMMERCIAL

## 2024-05-07 DIAGNOSIS — E03.8 SUBCLINICAL HYPOTHYROIDISM: ICD-10-CM

## 2024-05-07 DIAGNOSIS — E10.9 TYPE 1 DIABETES MELLITUS WITHOUT COMPLICATION (H): ICD-10-CM

## 2024-05-07 DIAGNOSIS — Z96.41 INSULIN PUMP STATUS: ICD-10-CM

## 2024-05-07 LAB
ANION GAP SERPL CALCULATED.3IONS-SCNC: 10 MMOL/L (ref 7–15)
BUN SERPL-MCNC: 20.7 MG/DL (ref 6–20)
CALCIUM SERPL-MCNC: 9.3 MG/DL (ref 8.6–10)
CHLORIDE SERPL-SCNC: 103 MMOL/L (ref 98–107)
CREAT SERPL-MCNC: 1.22 MG/DL (ref 0.67–1.17)
DEPRECATED HCO3 PLAS-SCNC: 25 MMOL/L (ref 22–29)
EGFRCR SERPLBLD CKD-EPI 2021: 70 ML/MIN/1.73M2
GLUCOSE SERPL-MCNC: 183 MG/DL (ref 70–99)
HBA1C MFR BLD: 6.3 % (ref 0–5.6)
POTASSIUM SERPL-SCNC: 4.5 MMOL/L (ref 3.4–5.3)
SODIUM SERPL-SCNC: 138 MMOL/L (ref 135–145)
T4 FREE SERPL-MCNC: 1.06 NG/DL (ref 0.9–1.7)
TSH SERPL DL<=0.005 MIU/L-ACNC: 4.36 UIU/ML (ref 0.3–4.2)

## 2024-05-07 PROCEDURE — 84443 ASSAY THYROID STIM HORMONE: CPT

## 2024-05-07 PROCEDURE — 86376 MICROSOMAL ANTIBODY EACH: CPT

## 2024-05-07 PROCEDURE — 83036 HEMOGLOBIN GLYCOSYLATED A1C: CPT

## 2024-05-07 PROCEDURE — 36415 COLL VENOUS BLD VENIPUNCTURE: CPT

## 2024-05-07 PROCEDURE — 80048 BASIC METABOLIC PNL TOTAL CA: CPT

## 2024-05-07 PROCEDURE — 84439 ASSAY OF FREE THYROXINE: CPT

## 2024-05-08 LAB — THYROPEROXIDASE AB SERPL-ACNC: 400 IU/ML

## 2024-05-14 ENCOUNTER — OFFICE VISIT (OUTPATIENT)
Dept: ENDOCRINOLOGY | Facility: CLINIC | Age: 54
End: 2024-05-14
Payer: COMMERCIAL

## 2024-05-14 VITALS
DIASTOLIC BLOOD PRESSURE: 84 MMHG | WEIGHT: 211.4 LBS | BODY MASS INDEX: 30.33 KG/M2 | HEART RATE: 61 BPM | SYSTOLIC BLOOD PRESSURE: 130 MMHG

## 2024-05-14 DIAGNOSIS — E10.3393 TYPE 1 DIABETES MELLITUS WITH MODERATE NONPROLIFERATIVE RETINOPATHY OF BOTH EYES WITHOUT MACULAR EDEMA (H): Primary | ICD-10-CM

## 2024-05-14 DIAGNOSIS — E03.8 SUBCLINICAL HYPOTHYROIDISM: ICD-10-CM

## 2024-05-14 DIAGNOSIS — Z96.41 INSULIN PUMP STATUS: ICD-10-CM

## 2024-05-14 PROCEDURE — 95251 CONT GLUC MNTR ANALYSIS I&R: CPT | Performed by: INTERNAL MEDICINE

## 2024-05-14 PROCEDURE — G2211 COMPLEX E/M VISIT ADD ON: HCPCS | Performed by: INTERNAL MEDICINE

## 2024-05-14 PROCEDURE — 99214 OFFICE O/P EST MOD 30 MIN: CPT | Performed by: INTERNAL MEDICINE

## 2024-05-14 RX ORDER — LEVOTHYROXINE SODIUM 50 UG/1
50 TABLET ORAL DAILY
Qty: 90 TABLET | Refills: 1 | Status: SHIPPED | OUTPATIENT
Start: 2024-05-14 | End: 2024-10-02
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
## 2024-06-15 DIAGNOSIS — E10.9 TYPE 1 DIABETES MELLITUS WITHOUT COMPLICATION (H): ICD-10-CM

## 2024-06-15 DIAGNOSIS — Z96.41 INSULIN PUMP STATUS: ICD-10-CM

## 2024-06-16 ENCOUNTER — HEALTH MAINTENANCE LETTER (OUTPATIENT)
Age: 54
End: 2024-06-16

## 2024-06-17 RX ORDER — INSULIN LISPRO 100 [IU]/ML
INJECTION, SOLUTION INTRAVENOUS; SUBCUTANEOUS
Qty: 50 ML | Refills: 3 | Status: SHIPPED | OUTPATIENT
Start: 2024-06-17

## 2024-06-17 RX ORDER — PROCHLORPERAZINE 25 MG/1
SUPPOSITORY RECTAL
Qty: 9 EACH | Refills: 3 | Status: SHIPPED | OUTPATIENT
Start: 2024-06-17

## 2024-06-17 NOTE — TELEPHONE ENCOUNTER
Last Written Prescription Date:  7/20/23  Last Fill Quantity: 50/3   Last office visit: 5/14/2024 with prescribing provider:  Dr. Escalona   Future Office Visit:  8/29/24    Requested Prescriptions   Pending Prescriptions Disp Refills    HUMALOG 100 UNIT/ML injection [Pharmacy Med Name: HUMALOG VIAL 10ML 100U/ML] 50 mL 3     Sig: ADMINISTER USING INSULIN PUMP, TOTAL DAILY DOSE APPROXIMATELY 50 UNITS       Insulin Protocol Failed - 6/15/2024  1:45 PM        Failed - Chart Review Required     Review Chart.    Do not approve if insulin is used in a pump.  Instead, direct refill request to the patient's endocrinologist.  If the patient doesn't have an endocrinologist, then send the refill to the patient's PCP for review            Passed - Medication is active on med list        Passed - Has GFR on file in past 12 months and most recent value is normal        Passed - Recent (6 mo) or future (90 days) visit within the authorizing provider's specialty     The patient must have completed an in-person or virtual visit within the past 6 months or has a future visit scheduled within the next 90 days with the authorizing provider s specialty.  Urgent care and e-visits do not quality as an office visit for this protocol.          Passed - Medication indicated for associated diagnosis     Medication is associated with one or more of the following diagnoses:   - Type 1 diabetes mellitus  - Type 2 diabetes mellitus  - Diabetic nephropathy; Prophylaxis  - Neuropathy due to diabetes mellitus; Prophylaxis  - Retinopathy due to diabetes mellitus; Prophylaxis  - Diabetes mellitus associated with cystic fibrosis  - Disorder of cardiovascular system; Prophylaxis - Type 1 diabetes mellitus   - Disorder of cardiovascular system; Prophylaxis - Type 2 diabetes mellitus            Passed - Patient is 18 years of age or older          Continuous Glucose Sensor (DEXCOM G6 SENSOR) MISC [Pharmacy Med Name: DEXCOM G6 SENSOR 3'S]  3     Sig: USE 1  SENSOR CONTINUOUS GLUCOSE TESTING AS NEEDED, CHANGE EVERY 10 DAYS       There is no refill protocol information for this order        Refills sent  Tawana Sanchez RN

## 2024-06-21 ENCOUNTER — MYC REFILL (OUTPATIENT)
Dept: ENDOCRINOLOGY | Facility: CLINIC | Age: 54
End: 2024-06-21
Payer: COMMERCIAL

## 2024-06-21 DIAGNOSIS — E78.5 HYPERLIPIDEMIA LDL GOAL <100: ICD-10-CM

## 2024-06-21 DIAGNOSIS — E10.9 TYPE 1 DIABETES MELLITUS WITHOUT COMPLICATION (H): ICD-10-CM

## 2024-06-24 RX ORDER — ATORVASTATIN CALCIUM 10 MG/1
10 TABLET, FILM COATED ORAL DAILY
Qty: 90 TABLET | Refills: 1 | Status: SHIPPED | OUTPATIENT
Start: 2024-06-24

## 2024-06-24 NOTE — TELEPHONE ENCOUNTER
Last Written Prescription Date:  3/13/24  Last Fill Quantity: 90,  # refills: 1   Last office visit: 5/14/2024    Future Office Visit:  8/29/24    Requested Prescriptions   Pending Prescriptions Disp Refills    atorvastatin (LIPITOR) 10 MG tablet 90 tablet 1     Sig: Take 1 tablet (10 mg) by mouth daily       Antihyperlipidemic agents Passed - 6/21/2024  6:35 PM        Passed - LDL on file in the past 12 months        Passed - Medication is active on med list        Passed - Recent (12 mo) or future (90 days) visit within the authorizing provider's specialty     The patient must have completed an in-person or virtual visit within the past 12 months or has a future visit scheduled within the next 90 days with the authorizing provider s specialty.  Urgent care and e-visits do not quality as an office visit for this protocol.          Passed - Patient is age 18 years or older           Refills sent  Tawana Sanchez RN

## 2024-08-22 ENCOUNTER — LAB (OUTPATIENT)
Dept: LAB | Facility: CLINIC | Age: 54
End: 2024-08-22
Payer: COMMERCIAL

## 2024-08-22 DIAGNOSIS — E03.8 SUBCLINICAL HYPOTHYROIDISM: ICD-10-CM

## 2024-08-22 DIAGNOSIS — E10.3393 TYPE 1 DIABETES MELLITUS WITH MODERATE NONPROLIFERATIVE RETINOPATHY OF BOTH EYES WITHOUT MACULAR EDEMA (H): ICD-10-CM

## 2024-08-22 LAB
ANION GAP SERPL CALCULATED.3IONS-SCNC: 11 MMOL/L (ref 7–15)
BUN SERPL-MCNC: 23 MG/DL (ref 6–20)
CALCIUM SERPL-MCNC: 9.1 MG/DL (ref 8.8–10.4)
CHLORIDE SERPL-SCNC: 105 MMOL/L (ref 98–107)
CREAT SERPL-MCNC: 1.25 MG/DL (ref 0.67–1.17)
CREAT UR-MCNC: 268 MG/DL
EGFRCR SERPLBLD CKD-EPI 2021: 68 ML/MIN/1.73M2
GLUCOSE SERPL-MCNC: 119 MG/DL (ref 70–99)
HBA1C MFR BLD: 6.3 % (ref 0–5.6)
HCO3 SERPL-SCNC: 26 MMOL/L (ref 22–29)
MICROALBUMIN UR-MCNC: <12 MG/L
MICROALBUMIN/CREAT UR: NORMAL MG/G{CREAT}
POTASSIUM SERPL-SCNC: 4.4 MMOL/L (ref 3.4–5.3)
SODIUM SERPL-SCNC: 142 MMOL/L (ref 135–145)
T4 FREE SERPL-MCNC: 1.15 NG/DL (ref 0.9–1.7)
TSH SERPL DL<=0.005 MIU/L-ACNC: 3.09 UIU/ML (ref 0.3–4.2)

## 2024-08-22 PROCEDURE — 84443 ASSAY THYROID STIM HORMONE: CPT

## 2024-08-22 PROCEDURE — 82043 UR ALBUMIN QUANTITATIVE: CPT

## 2024-08-22 PROCEDURE — 80048 BASIC METABOLIC PNL TOTAL CA: CPT

## 2024-08-22 PROCEDURE — 36415 COLL VENOUS BLD VENIPUNCTURE: CPT

## 2024-08-22 PROCEDURE — 84439 ASSAY OF FREE THYROXINE: CPT

## 2024-08-22 PROCEDURE — 82570 ASSAY OF URINE CREATININE: CPT

## 2024-08-22 PROCEDURE — 83036 HEMOGLOBIN GLYCOSYLATED A1C: CPT

## 2024-08-29 ENCOUNTER — VIRTUAL VISIT (OUTPATIENT)
Dept: ENDOCRINOLOGY | Facility: CLINIC | Age: 54
End: 2024-08-29
Payer: COMMERCIAL

## 2024-08-29 DIAGNOSIS — Z86.39 HISTORY OF DIABETIC RETINOPATHY: ICD-10-CM

## 2024-08-29 DIAGNOSIS — Z96.41 INSULIN PUMP STATUS: ICD-10-CM

## 2024-08-29 DIAGNOSIS — E10.9 TYPE 1 DIABETES MELLITUS WITHOUT COMPLICATION (H): Primary | ICD-10-CM

## 2024-08-29 DIAGNOSIS — E03.8 SUBCLINICAL HYPOTHYROIDISM: ICD-10-CM

## 2024-08-29 PROCEDURE — 95251 CONT GLUC MNTR ANALYSIS I&R: CPT | Performed by: INTERNAL MEDICINE

## 2024-08-29 PROCEDURE — G2211 COMPLEX E/M VISIT ADD ON: HCPCS | Mod: 95 | Performed by: INTERNAL MEDICINE

## 2024-08-29 PROCEDURE — 99214 OFFICE O/P EST MOD 30 MIN: CPT | Mod: 95 | Performed by: INTERNAL MEDICINE
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
## 2024-09-11 DIAGNOSIS — Z96.41 INSULIN PUMP STATUS: ICD-10-CM

## 2024-09-11 DIAGNOSIS — E10.9 TYPE 1 DIABETES MELLITUS WITHOUT COMPLICATION (H): ICD-10-CM

## 2024-09-12 RX ORDER — PROCHLORPERAZINE 25 MG/1
SUPPOSITORY RECTAL
Qty: 1 EACH | Refills: 3 | Status: SHIPPED | OUTPATIENT
Start: 2024-09-12

## 2024-09-12 NOTE — TELEPHONE ENCOUNTER
Last Written Prescription Date:  7/20/23  Last Fill Quantity: 1,  # refills: 3   Last office visit: 5/14/2024 ; last virtual visit: 8/29/2024 with prescribing provider:  Iqra   Future Office Visit:  8/29/24    Requested Prescriptions   Pending Prescriptions Disp Refills    Continuous Glucose Transmitter (DEXCOM G6 TRANSMITTER) MISC [Pharmacy Med Name: DEXCOM G6 TRANSMITTER]  3     Sig: USE 1 TRANSMITTER CONTINUOUS AS NEEDED FOR CONTINUOUS GLUCOSE TESTING, CHANGE EVERY 90 DAYS       Diabetic Supplies Protocol Failed - 9/11/2024  6:05 PM        Failed - Medication indicated for associated diagnosis        Passed - Medication is active on med list        Passed - Recent (12 month) or future (90 days) visit with authorizing provider s specialty     The patient must have completed an in-person or virtual visit within the past 12 months or has a future visit scheduled within the next 90 days with the authorizing provider s specialty.  Urgent care and e-visits do not quality as an office visit for this protocol.          Passed - Patient is 18 years of age or older           Jane Mixon RN

## 2024-10-02 ENCOUNTER — MYC REFILL (OUTPATIENT)
Dept: ENDOCRINOLOGY | Facility: CLINIC | Age: 54
End: 2024-10-02
Payer: COMMERCIAL

## 2024-10-02 DIAGNOSIS — E03.8 SUBCLINICAL HYPOTHYROIDISM: ICD-10-CM

## 2024-10-03 RX ORDER — LEVOTHYROXINE SODIUM 50 UG/1
50 TABLET ORAL DAILY
Qty: 90 TABLET | Refills: 1 | Status: SHIPPED | OUTPATIENT
Start: 2024-10-03

## 2024-10-03 NOTE — TELEPHONE ENCOUNTER
Last Written Prescription Date:  5/14/24  Last Fill Quantity: 90,  # refills: 1   Last office visit: 5/14/2024 ; last virtual visit: 8/29/2024 with prescribing provider:  Dr. Escalona   Future Office Visit: 2/26/25    Requested Prescriptions   Pending Prescriptions Disp Refills    levothyroxine (SYNTHROID/LEVOTHROID) 50 MCG tablet 90 tablet 1     Sig: Take 1 tablet (50 mcg) by mouth daily.       Thyroid Protocol Passed - 10/2/2024  4:14 PM        Passed - Patient is 12 years or older        Passed - Recent (12 mo) or future (30 days) visit within the authorizing provider's specialty     The patient must have completed an in-person or virtual visit within the past 12 months or has a future visit scheduled within the next 90 days with the authorizing provider s specialty.  Urgent care and e-visits do not quality as an office visit for this protocol.          Passed - Medication is active on med list        Passed - Medication indicated for associated diagnosis     Medication is associated with one or more of the following diagnoses:  Hypothyroidism  Thyroid stimulating hormone suppression therapy  Thyroid cancer  Acquired atrophy of thyroid          Passed - Normal TSH on file in past 12 months     Recent Labs   Lab Test 08/22/24  0827   TSH 3.09                 Refills sent  Tawana Sanchez RN

## 2024-12-09 ENCOUNTER — MYC REFILL (OUTPATIENT)
Dept: ENDOCRINOLOGY | Facility: CLINIC | Age: 54
End: 2024-12-09
Payer: COMMERCIAL

## 2024-12-09 DIAGNOSIS — E78.5 HYPERLIPIDEMIA LDL GOAL <100: ICD-10-CM

## 2024-12-09 DIAGNOSIS — E10.9 TYPE 1 DIABETES MELLITUS WITHOUT COMPLICATION (H): ICD-10-CM

## 2024-12-09 RX ORDER — ATORVASTATIN CALCIUM 10 MG/1
10 TABLET, FILM COATED ORAL DAILY
Qty: 90 TABLET | Refills: 1 | Status: SHIPPED | OUTPATIENT
Start: 2024-12-09

## 2024-12-09 NOTE — TELEPHONE ENCOUNTER
Last Written Prescription Date:  6/24/24  Last Fill Quantity: 90,  # refills: 1   Last office visit: 5/14/2024 ; last virtual visit: 8/29/2024 with prescribing provider:  Dr. Escalona   Future Office Visit: 2/26/25     Requested Prescriptions   Pending Prescriptions Disp Refills    atorvastatin (LIPITOR) 10 MG tablet 90 tablet 1     Sig: Take 1 tablet (10 mg) by mouth daily.       Antihyperlipidemic agents Passed - 12/9/2024 11:28 AM        Passed - LDL on file in the past 12 months        Passed - Medication is active on med list        Passed - Recent (12 mo) or future (90 days) visit within the authorizing provider's specialty     The patient must have completed an in-person or virtual visit within the past 12 months or has a future visit scheduled within the next 90 days with the authorizing provider s specialty.  Urgent care and e-visits do not qualify as an office visit for this protocol.          Passed - Patient is age 18 years or older           Refills sent  Tawana Sanchez RN

## 2025-02-13 ENCOUNTER — TELEPHONE (OUTPATIENT)
Dept: FAMILY MEDICINE | Facility: CLINIC | Age: 55
End: 2025-02-13
Payer: COMMERCIAL

## 2025-02-13 NOTE — TELEPHONE ENCOUNTER
Summary:    Patient is due/failing the following:   Eye exam    Reviewed:    [] CARE EVERYWHERE  [] LAST OV NOTE   [] FYI TAB  [] MYCHART ACTIVE?  [] LAST PANEL ENCOUNTER  [] FUTURE APPTS  [] IMMUNIZATIONS  [] Media Tab            Action needed:   Patient needs referral/order: eye exam    Type of outreach:    Contacted [facility] [phone] and they state will fax over last eye exam                                                                               Mariama Chan/TRELL  [facility]

## 2025-02-20 DIAGNOSIS — E10.9 TYPE 1 DIABETES MELLITUS WITHOUT COMPLICATION (H): ICD-10-CM

## 2025-02-20 RX ORDER — INSULIN PMP CART,AUT,G6/7,CNTR
EACH SUBCUTANEOUS
Qty: 30 EACH | Refills: 3 | Status: SHIPPED | OUTPATIENT
Start: 2025-02-20

## 2025-02-20 NOTE — TELEPHONE ENCOUNTER
Last Written Prescription Date:  3/18/24  Last Fill Quantity: 30,  # refills: 3   Last office visit: 5/14/2024 ; last virtual visit: 8/29/2024 with prescribing provider:  Dr. Escalona   Future Office Visit:  2/26/25    Requested Prescriptions   Pending Prescriptions Disp Refills    Insulin Disposable Pump (OMNIPOD 5 PODS (GEN 5)) MISC [Pharmacy Med Name: OMNIPOD-5 G6/G7 PK 5S (GEN 5)]  3     Sig: CHANGE 1 POD EVERY 72 HOURS       There is no refill protocol information for this order        Refills sent  Tawana Sanchez RN

## 2025-02-24 ENCOUNTER — TRANSFERRED RECORDS (OUTPATIENT)
Dept: HEALTH INFORMATION MANAGEMENT | Facility: CLINIC | Age: 55
End: 2025-02-24
Payer: COMMERCIAL

## 2025-02-24 ENCOUNTER — PATIENT OUTREACH (OUTPATIENT)
Dept: CARE COORDINATION | Facility: CLINIC | Age: 55
End: 2025-02-24
Payer: COMMERCIAL

## 2025-02-26 ENCOUNTER — OFFICE VISIT (OUTPATIENT)
Dept: ENDOCRINOLOGY | Facility: CLINIC | Age: 55
End: 2025-02-26
Payer: COMMERCIAL

## 2025-02-26 VITALS
SYSTOLIC BLOOD PRESSURE: 121 MMHG | DIASTOLIC BLOOD PRESSURE: 80 MMHG | HEART RATE: 75 BPM | BODY MASS INDEX: 30.28 KG/M2 | WEIGHT: 211 LBS

## 2025-02-26 DIAGNOSIS — E10.9 TYPE 1 DIABETES MELLITUS WITHOUT COMPLICATION (H): Primary | ICD-10-CM

## 2025-02-26 DIAGNOSIS — Z96.41 INSULIN PUMP STATUS: ICD-10-CM

## 2025-02-26 DIAGNOSIS — E06.3 HYPOTHYROIDISM DUE TO HASHIMOTO THYROIDITIS: ICD-10-CM

## 2025-02-26 RX ORDER — LEVOTHYROXINE SODIUM 75 UG/1
75 TABLET ORAL DAILY
Qty: 90 TABLET | Refills: 1 | Status: SHIPPED | OUTPATIENT
Start: 2025-02-26
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
## 2025-03-13 NOTE — TELEPHONE ENCOUNTER
Patient currently passing eye exam per quality    Mariama Chan/[facility]---[facility]

## 2025-04-05 ENCOUNTER — MYC REFILL (OUTPATIENT)
Dept: ENDOCRINOLOGY | Facility: CLINIC | Age: 55
End: 2025-04-05
Payer: COMMERCIAL

## 2025-04-05 DIAGNOSIS — E78.5 HYPERLIPIDEMIA LDL GOAL <100: ICD-10-CM

## 2025-04-05 DIAGNOSIS — E10.9 TYPE 1 DIABETES MELLITUS WITHOUT COMPLICATION (H): ICD-10-CM

## 2025-04-07 RX ORDER — ATORVASTATIN CALCIUM 10 MG/1
10 TABLET, FILM COATED ORAL DAILY
Qty: 90 TABLET | Refills: 1 | Status: SHIPPED | OUTPATIENT
Start: 2025-04-07

## 2025-04-07 NOTE — TELEPHONE ENCOUNTER
Last Written Prescription Date:  12/9/24  Last Fill Quantity: 90,  # refills: 1   Last office visit: 2/26/2025 ; last virtual visit: 8/29/2024 with prescribing provider:  Dr. Escalona   Future Office Visit: 6/3/25      Requested Prescriptions   Pending Prescriptions Disp Refills    atorvastatin (LIPITOR) 10 MG tablet 90 tablet 1     Sig: Take 1 tablet (10 mg) by mouth daily.       Antihyperlipidemic agents Passed - 4/7/2025  9:54 AM        Passed - LDL on file in the past 12 months        Passed - Medication is active on med list and the sig matches. RN to manually verify dose and sig if red X/fail.     If the protocol passes (green check), you do not need to verify med dose and sig.    A prescription matches if they are the same clinical intention.    For Example: once daily and every morning are the same.    The protocol can not identify upper and lower case letters as matching and will fail.     For Example: Take 1 tablet (50 mg) by mouth daily     TAKE 1 TABLET (50 MG) BY MOUTH DAILY    For all fails (red x), verify dose and sig.    If the refill does match what is on file, the RN can still proceed to approve the refill request.       If they do not match, route to the appropriate provider.             Passed - Recent (12 mo) or future (90 days) visit within the authorizing provider's specialty     The patient must have completed an in-person or virtual visit within the past 12 months or has a future visit scheduled within the next 90 days with the authorizing provider s specialty.  Urgent care and e-visits do not qualify as an office visit for this protocol.          Passed - Patient is age 18 years or older           Refills sent to requested local pharmacy. Tawana Sanchez RN

## 2025-05-27 ENCOUNTER — LAB (OUTPATIENT)
Dept: LAB | Facility: CLINIC | Age: 55
End: 2025-05-27
Payer: COMMERCIAL

## 2025-05-27 DIAGNOSIS — E10.9 TYPE 1 DIABETES MELLITUS WITHOUT COMPLICATION (H): ICD-10-CM

## 2025-05-27 DIAGNOSIS — E06.3 HYPOTHYROIDISM DUE TO HASHIMOTO THYROIDITIS: ICD-10-CM

## 2025-05-27 LAB
EST. AVERAGE GLUCOSE BLD GHB EST-MCNC: 140 MG/DL
HBA1C MFR BLD: 6.5 % (ref 0–5.6)

## 2025-05-27 PROCEDURE — 36415 COLL VENOUS BLD VENIPUNCTURE: CPT

## 2025-05-27 PROCEDURE — 83036 HEMOGLOBIN GLYCOSYLATED A1C: CPT

## 2025-05-27 PROCEDURE — 84439 ASSAY OF FREE THYROXINE: CPT

## 2025-05-27 PROCEDURE — 82043 UR ALBUMIN QUANTITATIVE: CPT

## 2025-05-27 PROCEDURE — 80048 BASIC METABOLIC PNL TOTAL CA: CPT

## 2025-05-27 PROCEDURE — 82570 ASSAY OF URINE CREATININE: CPT

## 2025-05-27 PROCEDURE — 84443 ASSAY THYROID STIM HORMONE: CPT

## 2025-05-28 LAB
ANION GAP SERPL CALCULATED.3IONS-SCNC: 10 MMOL/L (ref 7–15)
BUN SERPL-MCNC: 21.6 MG/DL (ref 6–20)
CALCIUM SERPL-MCNC: 9.4 MG/DL (ref 8.8–10.4)
CHLORIDE SERPL-SCNC: 105 MMOL/L (ref 98–107)
CREAT SERPL-MCNC: 1.17 MG/DL (ref 0.67–1.17)
CREAT UR-MCNC: 151 MG/DL
EGFRCR SERPLBLD CKD-EPI 2021: 73 ML/MIN/1.73M2
GLUCOSE SERPL-MCNC: 113 MG/DL (ref 70–99)
HCO3 SERPL-SCNC: 27 MMOL/L (ref 22–29)
MICROALBUMIN UR-MCNC: <12 MG/L
MICROALBUMIN/CREAT UR: NORMAL MG/G{CREAT}
POTASSIUM SERPL-SCNC: 4.6 MMOL/L (ref 3.4–5.3)
SODIUM SERPL-SCNC: 142 MMOL/L (ref 135–145)
T4 FREE SERPL-MCNC: 1.22 NG/DL (ref 0.9–1.7)
TSH SERPL DL<=0.005 MIU/L-ACNC: 1.49 UIU/ML (ref 0.3–4.2)

## 2025-05-29 ENCOUNTER — RESULTS FOLLOW-UP (OUTPATIENT)
Dept: ENDOCRINOLOGY | Facility: CLINIC | Age: 55
End: 2025-05-29

## 2025-06-03 ENCOUNTER — VIRTUAL VISIT (OUTPATIENT)
Dept: ENDOCRINOLOGY | Facility: CLINIC | Age: 55
End: 2025-06-03
Payer: COMMERCIAL

## 2025-06-03 ENCOUNTER — MYC REFILL (OUTPATIENT)
Dept: ENDOCRINOLOGY | Facility: CLINIC | Age: 55
End: 2025-06-03

## 2025-06-03 DIAGNOSIS — Z96.41 INSULIN PUMP STATUS: ICD-10-CM

## 2025-06-03 DIAGNOSIS — E10.9 TYPE 1 DIABETES MELLITUS WITHOUT COMPLICATION (H): Primary | ICD-10-CM

## 2025-06-03 DIAGNOSIS — Z86.39 HISTORY OF DIABETIC RETINOPATHY: ICD-10-CM

## 2025-06-03 DIAGNOSIS — E10.9 TYPE 1 DIABETES MELLITUS WITHOUT COMPLICATION (H): ICD-10-CM

## 2025-06-03 DIAGNOSIS — E06.3 HYPOTHYROIDISM DUE TO HASHIMOTO THYROIDITIS: ICD-10-CM

## 2025-06-03 PROCEDURE — G2211 COMPLEX E/M VISIT ADD ON: HCPCS | Performed by: INTERNAL MEDICINE

## 2025-06-03 PROCEDURE — 3044F HG A1C LEVEL LT 7.0%: CPT | Mod: 95 | Performed by: INTERNAL MEDICINE

## 2025-06-03 PROCEDURE — 98006 SYNCH AUDIO-VIDEO EST MOD 30: CPT | Performed by: INTERNAL MEDICINE

## 2025-06-03 PROCEDURE — 95251 CONT GLUC MNTR ANALYSIS I&R: CPT | Performed by: INTERNAL MEDICINE

## 2025-06-03 RX ORDER — INSULIN LISPRO 100 [IU]/ML
INJECTION, SOLUTION INTRAVENOUS; SUBCUTANEOUS
Qty: 50 ML | Refills: 3 | Status: SHIPPED | OUTPATIENT
Start: 2025-06-03 | End: 2025-06-04
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
## 2025-06-03 NOTE — TELEPHONE ENCOUNTER
Last Written Prescription Date:  6/17/24  Last Fill Quantity: 50,  # refills: 3   Last office visit: 2/26/2025 ; last virtual visit: 6/3/2025 with prescribing provider:  Dr. Escaloan   Future Office Visit:  9/16/25    Requested Prescriptions   Pending Prescriptions Disp Refills    HUMALOG 100 UNIT/ML injection 50 mL 3     Sig: ADMINISTER USING INSULIN PUMP, TOTAL DAILY DOSE APPROXIMATELY 50 UNITS       Insulin Protocol Failed - 6/3/2025  4:11 PM        Failed - Chart review required     Review Chart.    Do not approve if insulin is used in a pump.  Instead, direct refill request to the patient's endocrinologist.  If the patient doesn't have an endocrinologist, then send the refill to the patient's PCP for review            Passed - HgbA1C in past 3 or 6 months     If HgbA1C is 8 or greater, it needs to be on file within the past 3 months.  If less than 8, must be on file within the past 6 months.     Recent Labs   Lab Test 05/27/25  1256   A1C 6.5*             Passed - Medication is active on med list and the sig matches. RN to manually verify dose and sig if red X/fail.     If the protocol passes (green check), you do not need to verify med dose and sig.    A prescription matches if they are the same clinical intention.    For Example: once daily and every morning are the same.    The protocol can not identify upper and lower case letters as matching and will fail.     For Example: Take 1 tablet (50 mg) by mouth daily     TAKE 1 TABLET (50 MG) BY MOUTH DAILY    For all fails (red x), verify dose and sig.    If the refill does match what is on file, the RN can still proceed to approve the refill request.       If they do not match, route to the appropriate provider.             Passed - Recent (6 month) or future (90 days) visit with the authorizing provider's specialty (provided they have been seen in the past 9 months)     The patient must have completed an in-person or virtual visit within the past 6 months or has  a future visit scheduled within the next 90 days with the authorizing provider s specialty.  Urgent care and e-visits do not quality as an office visit for this protocol.          Passed - Medication indicated for associated diagnosis     Medication is associated with one or more of the following diagnoses:   - Type 1 diabetes mellitus  - Type 2 diabetes mellitus  - Diabetic nephropathy; Prophylaxis  - Neuropathy due to diabetes mellitus; Prophylaxis  - Retinopathy due to diabetes mellitus; Prophylaxis  - Diabetes mellitus associated with cystic fibrosis  - Disorder of cardiovascular system; Prophylaxis - Type 1 diabetes mellitus   - Disorder of cardiovascular system; Prophylaxis - Type 2 diabetes mellitus            Passed - Patient is 18 years of age or older           Refills sent  Tawana Sanchez RN

## 2025-06-04 DIAGNOSIS — Z96.41 INSULIN PUMP STATUS: ICD-10-CM

## 2025-06-04 DIAGNOSIS — E10.9 TYPE 1 DIABETES MELLITUS WITHOUT COMPLICATION (H): ICD-10-CM

## 2025-06-04 RX ORDER — PROCHLORPERAZINE 25 MG/1
SUPPOSITORY RECTAL
Qty: 9 EACH | Refills: 3 | Status: SHIPPED | OUTPATIENT
Start: 2025-06-04

## 2025-06-04 RX ORDER — INSULIN LISPRO 100 [IU]/ML
INJECTION, SOLUTION INTRAVENOUS; SUBCUTANEOUS
Qty: 50 ML | Refills: 3 | Status: SHIPPED | OUTPATIENT
Start: 2025-06-04

## 2025-06-04 NOTE — TELEPHONE ENCOUNTER
Last Written Prescription Date:  6/17/24  Last Fill Quantity: 9,  # refills: 3   Last office visit: 2/26/2025 ; last virtual visit: 6/3/2025 with prescribing provider:  Dr. Escalona   Future Office Visit:  9/16/25    Requested Prescriptions   Pending Prescriptions Disp Refills    Continuous Glucose Sensor (DEXCOM G6 SENSOR) MISC 9 each 3     Sig: Change every 10 days.       There is no refill protocol information for this order        Refills sent  Tawana Sanchez RN

## 2025-06-14 ENCOUNTER — MYC REFILL (OUTPATIENT)
Dept: ENDOCRINOLOGY | Facility: CLINIC | Age: 55
End: 2025-06-14
Payer: COMMERCIAL

## 2025-06-14 DIAGNOSIS — E10.9 TYPE 1 DIABETES MELLITUS WITHOUT COMPLICATION (H): ICD-10-CM

## 2025-06-16 RX ORDER — INSULIN PMP CART,AUT,G6/7,CNTR
1 EACH SUBCUTANEOUS
Qty: 30 EACH | Refills: 2 | Status: SHIPPED | OUTPATIENT
Start: 2025-06-16

## 2025-06-16 NOTE — TELEPHONE ENCOUNTER
Last Written Prescription Date:  2/20/25  Last Fill Quantity: 30,  # refills: 3   Last office visit: 2/26/2025 ; last virtual visit: 6/3/2025 with prescribing provider:  Dr. Escalona   Future Office Visit: 9/16/25     Pt wants Rx to go from mail order to [facility].  Will send remaining refills.  Tawana Sanchez RN

## 2025-06-21 ENCOUNTER — HEALTH MAINTENANCE LETTER (OUTPATIENT)
Age: 55
End: 2025-06-21

## 2025-07-03 ENCOUNTER — OFFICE VISIT (OUTPATIENT)
Dept: FAMILY MEDICINE | Facility: CLINIC | Age: 55
End: 2025-07-03
Payer: COMMERCIAL

## 2025-07-03 VITALS
RESPIRATION RATE: 16 BRPM | WEIGHT: 204.8 LBS | SYSTOLIC BLOOD PRESSURE: 98 MMHG | HEART RATE: 59 BPM | TEMPERATURE: 97.9 F | DIASTOLIC BLOOD PRESSURE: 64 MMHG | OXYGEN SATURATION: 97 % | HEIGHT: 70 IN | BODY MASS INDEX: 29.32 KG/M2
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
## 2025-07-03 NOTE — PROGRESS NOTES
"  Assessment & Plan     (W55.XXXA) Bug bite, initial encounter  (primary encounter diagnosis)  Comment:   Plan: 5 days ago he was doing some work with brushing trees.  He feels he might of had a bug bite or an exposure to something that made him very itchy and painful on the left lower anterior leg.  He has been keeping the area clean and monitoring it in the itching has almost resolved, reports that the area was red and swollen earlier but yesterday it started to look a bit better.  Today he has a flat erythematous irregular shaped macule on the left anterior shin approximately 3 cm x 4 cm that does not shruti with palpation.  There is no warmth no nodule, it does appear that there might have been 2 or 3 abrasions or bug bites.  No indications of infection.  I reassured him that he likely had an inflammatory response to a bug bite or a weed and that he should continue to see improvement although return of the normal color to the area might take a month or 2.  Certainly if it is getting larger or raised he needs to be reevaluated expresses understanding.    He does have underlying DM, report A1c 6ish       BMI  Estimated body mass index is 29.39 kg/m  as calculated from the following:    Height as of this encounter: 1.778 m (5' 10\").    Weight as of this encounter: 92.9 kg (204 lb 12.8 oz).             Subjective   Vinicius is a 55 year old, presenting for the following health issues:      Derm Problem (Pt c/o rash on lower L leg red, slightly raised, a littly itchy, denies pain. )        7/3/2025     8:32 AM   Additional Questions   Roomed by Esme     History of Present Illness       Reason for visit:  Infection treatment  Symptom onset:  3-7 days ago  Symptoms include:  Rash on left leg  Symptom intensity:  Mild  Symptom progression:  Staying the same  Had these symptoms before:  No  What makes it worse:  No  What makes it better:  No   He is taking medications regularly.                      Objective    BP 98/64 " "(BP Location: Right arm, Patient Position: Sitting)   Pulse 59   Temp 97.9  F (36.6  C) (Tympanic)   Resp 16   Ht 1.778 m (5' 10\")   Wt 92.9 kg (204 lb 12.8 oz)   SpO2 97%   BMI 29.39 kg/m    Body mass index is 29.39 kg/m .  Physical Exam   As above            Signed Electronically by: Emilia Gusman NP    "

## (undated) DEVICE — KIT ENDO TURNOVER/PROCEDURE W/CLEAN A SCOPE LINERS 103888

## (undated) RX ORDER — FENTANYL CITRATE 50 UG/ML
INJECTION, SOLUTION INTRAMUSCULAR; INTRAVENOUS
Status: DISPENSED
Start: 2018-08-13

## (undated) RX ORDER — ONDANSETRON 2 MG/ML
INJECTION INTRAMUSCULAR; INTRAVENOUS
Status: DISPENSED
Start: 2018-08-13